# Patient Record
Sex: MALE | Race: WHITE | NOT HISPANIC OR LATINO | Employment: OTHER | URBAN - METROPOLITAN AREA
[De-identification: names, ages, dates, MRNs, and addresses within clinical notes are randomized per-mention and may not be internally consistent; named-entity substitution may affect disease eponyms.]

---

## 2017-03-13 DIAGNOSIS — J18.8 OTHER PNEUMONIA, UNSPECIFIED ORGANISM: Primary | ICD-10-CM

## 2017-03-13 RX ORDER — CEFUROXIME AXETIL 500 MG/1
500 TABLET ORAL 2 TIMES DAILY
Qty: 20 TABLET | Refills: 0 | Status: SHIPPED | OUTPATIENT
Start: 2017-03-13 | End: 2017-07-07

## 2017-05-22 DIAGNOSIS — N52.9 ERECTILE DYSFUNCTION, UNSPECIFIED ERECTILE DYSFUNCTION TYPE: ICD-10-CM

## 2017-05-22 RX ORDER — SILDENAFIL 100 MG/1
100 TABLET, FILM COATED ORAL DAILY PRN
Qty: 10 TABLET | Refills: 1 | Status: SHIPPED | OUTPATIENT
Start: 2017-05-22

## 2017-07-07 ENCOUNTER — OFFICE VISIT (OUTPATIENT)
Dept: UROLOGY | Facility: CLINIC | Age: 72
End: 2017-07-07

## 2017-07-07 VITALS
DIASTOLIC BLOOD PRESSURE: 90 MMHG | BODY MASS INDEX: 34.14 KG/M2 | SYSTOLIC BLOOD PRESSURE: 139 MMHG | WEIGHT: 266 LBS | HEIGHT: 74 IN | HEART RATE: 62 BPM

## 2017-07-07 DIAGNOSIS — R97.20 ELEVATED PROSTATE SPECIFIC ANTIGEN (PSA): Primary | ICD-10-CM

## 2017-07-07 DIAGNOSIS — R35.1 NOCTURIA: ICD-10-CM

## 2017-07-07 DIAGNOSIS — R35.0 URINARY FREQUENCY: ICD-10-CM

## 2017-07-07 RX ORDER — CIPROFLOXACIN 500 MG/1
500 TABLET, FILM COATED ORAL 2 TIMES DAILY
Qty: 6 TABLET | Refills: 0 | Status: SHIPPED | OUTPATIENT
Start: 2017-07-07 | End: 2017-07-07

## 2017-07-07 RX ORDER — CEFPODOXIME PROXETIL 100 MG/1
100 TABLET, FILM COATED ORAL 2 TIMES DAILY
Qty: 6 TABLET | Refills: 0 | Status: SHIPPED | OUTPATIENT
Start: 2017-07-07 | End: 2019-04-02

## 2017-07-07 ASSESSMENT — ENCOUNTER SYMPTOMS
DYSURIA: 1
FLANK PAIN: 0
HEMATURIA: 0
DIFFICULTY URINATING: 1

## 2017-07-07 ASSESSMENT — PAIN SCALES - GENERAL: PAINLEVEL: NO PAIN (0)

## 2017-07-07 NOTE — MR AVS SNAPSHOT
After Visit Summary   7/7/2017    Cy Seymour    MRN: [id number]           Patient Information     Date Of Birth          1945        Visit Information        Provider Department      7/7/2017 9:45 AM Zulay Lopez MD [facility] Urology and [facility] for Prostate and Urologic Cancers        Today's Diagnoses     Elevated prostate specific antigen (PSA)    -  1      Care Instructions    Schedule an MRI of the prostate, a fusion biopsy appointment and a return appointment for results.      [facility] for Prostate and Urologic Cancers  MRI Fusion Prostate Bx Patient Instructions  What is MRI Fusion Prostate Bx?  The MRI fusion biopsy is used to target a specific area for sampling. It requires a needle guide to be inserted into the rectum next to the prostate. Next, MR images are viewed, the abnormal area is identified, and a needle is inserted through the guide to the targeted area for tissue samples.  How do I prepare for the exam?    Take Cipro 500mg one tablet in the morning and one in the evening starting the day prior to procedure x 3 days    You will be receiving a Gentamicin intramuscular injection in the Urology clinic 30 min prior to your procedure. Please plan to arrive 30 min earlier.      Do Fleets Enema 2 hrs prior to procedure (eat a light meal)    Stop NSAIDS/Aspirin 7 days prior to procedure. If you are taking any other anticoagulation medications such as Coumadin, Heparin, or Lovenox, please consult with your physician prior to scheduling the procedure    How long will procedure take?  MRI fusion biopsy will take about 1 hr. Please allow 2 hrs for the whole procedure (including pre and post)  When will I know my results?  We will schedule a 2 week follow up appointment for you to review your pathology results with your physician.   *Please arrive 30 min prior to your scheduled procedure time*  Who do I contact if I have questions?  Please call Urology and [facility] at  [phone] Opt # 3 to speak to a nurse.           Follow-ups after your visit        Your next 10 appointments already scheduled     Jul 27, 2017 11:30 AM CDT   (Arrive by 11:15 AM)   MR PROSTATE with [id number]   [facility]    [address] [id number] [phone]           Take your medicines as usual, unless your doctor tells you not to. Bring a list of your current medicines to your exam (including vitamins, minerals and over-the-counter drugs).  You will be given intravenous contrast for this exam. To prepare:   The day before your exam, drink extra fluids at least six 8-ounce glasses (unless your doctor tells you to restrict your fluids).   Have a blood test (creatinine test) within 30 days of your exam. Go to your clinic or Diagnostic Imaging Department for this test.  The MRI machine uses a strong magnet. Please wear clothes without metal (snaps, zippers). A sweatsuit works well, or we may give you a hospital gown.  Please remove any body piercings and hair extensions before you arrive. You will also remove watches, jewelry, hairpins, wallets, dentures, partial dental plates and hearing aids. You may wear contact lenses, and you may be able to wear your rings. We have a safe place to keep your personal items, but it is safer to leave them at home.   **IMPORTANT** THE INSTRUCTIONS BELOW ARE ONLY FOR THOSE PATIENTS WHO HAVE BEEN TOLD THEY WILL RECEIVE SEDATION OR GENERAL ANESTHESIA DURING THEIR MRI PROCEDURE:  IF YOU WILL RECEIVE SEDATION (take medicine to help you relax during your exam):   You must get the medicine from your doctor before you arrive. Bring the medicine to the exam. Do not take it at home.   Arrive one hour early. Bring someone who can take you home after the test. Your medicine will make you sleepy. After the exam, you may not drive, take a bus or take a taxi by yourself.   No eating 8 hours before your  exam. You may have clear liquids up until 4 hours before your exam. (Clear liquids include water, clear tea, black coffee and fruit juice without pulp.)  IF YOU WILL RECEIVE ANESTHESIA (be asleep for your exam):   Arrive 1 1/2 hours early. Bring someone who can take you home after the test. You may not drive, take a bus or take a taxi by yourself.   No eating 8 hours before your exam. You may have clear liquids up until 4 hours before your exam. (Clear liquids include water, clear tea, black coffee and fruit juice without pulp.)  Please call the Imaging Department at your exam site with any questions.            Aug 11, 2017  1:45 PM CDT   (Arrive by 1:30 PM)   Sonography/Biopsy with Zulay Lopez MD   [facility] Urology and [facility] for Prostate and Urologic Cancers ([facility])    [address] [place] [id number] [phone]            Aug 25, 2017  2:15 PM CDT   (Arrive by 2:00 PM)   Return Visit with Zulay Lopez MD   [facility] Urology and [facility] for Prostate and Urologic Cancers ([facility])    [address] [place] [id number] [phone]              Future tests that were ordered for you today     Open Future Orders        Priority Expected Expires Ordered    MRI Prostate Routine  7/7/2018 7/7/2017            Who to contact     Please call your clinic at [phone] to:    Ask questions about your health    Make or cancel appointments    Discuss your medicines    Learn about your test results    Speak to your doctor   If you have compliments or concerns about an experience at your clinic, or if you wish to file a complaint, please contact [facility] Patient Relations at [phone] or email us at [email or web address]         Additional Information About Your Visit        MyChart Information     [employer] gives you secure access to your electronic  "health record. If you see a primary care provider, you can also send messages to your care team and make appointments. If you have questions, please call your primary care clinic.  If you do not have a primary care provider, please call [phone] and they will assist you.      [employer] is an electronic gateway that provides easy, online access to your medical records. With [employer], you can request a clinic appointment, read your test results, renew a prescription or communicate with your care team.     To access your existing account, please contact your [facility] or call [phone] for assistance.        Care EveryWhere ID     This is your Care EveryWhere ID. This could be used by other organizations to access your [facility] medical records  [id number]        Your Vitals Were     Pulse Height BMI (Body Mass Index)             62 1.88 m (6' 2\") 34.15 kg/m2          Blood Pressure from Last 3 Encounters:   07/07/17 139/90   09/06/16 129/89   04/04/15 133/81    Weight from Last 3 Encounters:   07/07/17 120.7 kg (266 lb)   09/06/16 117.5 kg (259 lb 1.9 oz)   04/04/15 123.4 kg (272 lb)                 Today's Medication Changes          These changes are accurate as of: 7/7/17 11:19 AM.  If you have any questions, ask your nurse or doctor.               Start taking these medicines.        Dose/Directions    cefpodoxime 100 MG tablet   Commonly known as:  VANTIN   Used for:  Elevated prostate specific antigen (PSA)   Started by:  Zulay Lopez MD        Dose:  100 mg   Take 1 tablet (100 mg) by mouth 2 times daily   Quantity:  6 tablet   Refills:  0            Where to get your medicines      These medications were sent to [facility] [address] [place], [place] - [address]   [address] [place] [id number]     Phone:  [phone]     cefpodoxime 100 MG tablet                Primary Care Provider Office Phone # Fax #    Abe Valencia, " [phone]       [facility] [address] [facility] [address]  [facility] [address]        Equal Access to Services     ADÁN REBOLLEDO : Hadii aad ku hadlouisaoscar Wahl, behzadyany laddmariannaha, cami melanieemersonda kristal, raffaele pavelin hayaan shiloevaristo schwarz lamoremoses caraballo. So [facility] [phone].    ATENCIÓN: Si habla español, tiene a sharp disposición servicios gratuitos de asistencia lingüística. Llame al [phone].    We comply with applicable federal civil rights laws and [place] laws. We do not discriminate on the basis of race, color, national origin, age, disability sex, sexual orientation or gender identity.            Thank you!     Thank you for choosing [facility] UROLOGY AND [facility] FOR PROSTATE AND UROLOGIC CANCERS  for your care. Our goal is always to provide you with excellent care. Hearing back from our patients is one way we can continue to improve our services. Please take a few minutes to complete the written survey that you may receive in the mail after your visit with us. Thank you!             Your Updated Medication List - Protect others around you: Learn how to safely use, store and throw away your medicines at www.disposemymeds.org.          This list is accurate as of: 7/7/17 11:19 AM.  Always use your most recent med list.                   Brand Name Dispense Instructions for use Diagnosis    AMLODIPINE BESYLATE PO      Take by mouth daily        amoxicillin 500 MG capsule    AMOXIL    4 capsule    Take 4 capsules 1 hour before dental procedure    Preventive antibiotic       aspirin 81 MG chewable tablet      Take 81 mg by mouth daily.        cefpodoxime 100 MG tablet    VANTIN    6 tablet    Take 1 tablet (100 mg) by mouth 2 times daily    Elevated prostate specific antigen (PSA)       nitroGLYcerin 0.4 MG sublingual tablet    NITROSTAT    10 tablet    Place 1 tablet under the tongue every 5 minutes as needed for chest pain.    CAD (coronary artery disease)       sildenafil 100 MG cap/tab    VIAGRA    10  tablet    Take 1 tablet (100 mg) by mouth daily as needed for erectile dysfunction Do not take with NTG medication    Erectile dysfunction, unspecified erectile dysfunction type

## 2017-07-07 NOTE — PROGRESS NOTES
"It was my pleasure to see Cy Seymour a 72 year old year old male today. Patient was referred by Dr. Valencia for possible MRI fusion prostate biopsy    HPI: Pt is a very pleasant 71 yo white male who presents to office today complaining of enlarged prostate and interested in MRI fusion biopsy. He states he has been followed by another urologist in the past and was diagnosed with an enlarged prostate. He reports his PSA has been monitored over past several years and has ranged 3-8. He also says he has undergone 1-2 MRIs of prostate in past with positive lesions but these results are unavailable at this time. He was considering a \"Urolift\" procedure but was told he should undergo prostate biospy prior to this intervention to rule out prostate cancer. He was schedule for biopsy at his previous urologist but opted to pursue MRI fusion biopsy with another provider.    Today his symptoms include nocturia, on average 5 times per night but as severe as 10 times per night. He also c/o of slow stream, starting and stopping, urgency with rare instances of leakage and incontinence. He does not wear pads/diapers. He has been prescribed medication for BPH in the past but has never taken anything.    Denies history of UTIs, kidney infections, or kidney stones.He reports a positive family hx of prostate cancer in father ( of CHF at 88 yo; tx unknown) and brother (living at 76 yo; tx with radiation).    Past Medical History:   Diagnosis Date     ADHD (attention deficit hyperactivity disorder)      Aneurysm artery, iliac (H)     father had AAA surgery     Anxiety      Atrial fibrillation (H) 2011     Bicuspid aortic valve      Coronary artery disease     ablation      Depressive disorder      Diverticulitis      Gastro-oesophageal reflux disease      Hearing loss      Hypertension      Polymyalgia rheumatica (H)      Restless legs      S/P nasal septoplasty          Seasonal affective disorder (H)      " "Sleep apnea      Tachycardia     exercise initiated       Past Surgical History:   Procedure Laterality Date     BACK SURGERY      cervical fusion 2005     BLEPHAROPLASTY BILATERAL  2/27/2012    Procedure:BLEPHAROPLASTY BILATERAL; BILATERAL UPPER LID BLEPHAROPLASTY ; Surgeon:CÉSAR, JESSE RAMIREZ; Location:[facility]     CARDIAC SURGERY      stent 2003     ORTHOPEDIC SURGERY      bilat hip replaced 2008,2009   Ablation for A-fib in 2012.    Family History   Problem Relation Age of Onset     Arthritis Mother      Hypertension Mother      Hypertension Father      Heart Failure Father      HEART DISEASE Maternal Grandfather      HEART DISEASE Paternal Grandmother    Prostate ca in father and brother.    Current Outpatient Prescriptions   Medication Sig Dispense Refill     sildenafil (VIAGRA) 100 MG cap/tab Take 1 tablet (100 mg) by mouth daily as needed for erectile dysfunction Do not take with NTG medication 10 tablet 1     AMLODIPINE BESYLATE PO Take by mouth daily       amoxicillin (AMOXIL) 500 MG capsule Take 4 capsules 1 hour before dental procedure 4 capsule 1     aspirin 81 MG chewable tablet Take 81 mg by mouth daily.       nitroglycerin (NITROSTAT) 0.4 MG SL tablet Place 1 tablet under the tongue every 5 minutes as needed for chest pain. 10 tablet 0       ALLERGIES: Sulfa drugs      REVIEW OF SYSTEMS:  Skin: negative  Eyes: negative  Ears/Nose/Throat: negative  Respiratory: No shortness of breath, dyspnea on exertion, cough, or hemoptysis  Cardiovascular: negative  Gastrointestinal: negative  Genitourinary: as above  Musculoskeletal: negative  Neurologic: negative  Psych: negative  Heme/lymph: negative  Endocrine: negative    GENERAL PHYSICAL EXAM:   Vitals: /90  Pulse 62  Ht 1.88 m (6' 2\")  Wt 120.7 kg (266 lb)  BMI 34.15 kg/m2  Body mass index is 34.15 kg/(m^2).  Constitutional: healthy, alert and no distress  Head: Normocephalic. No masses, lesions, tenderness or abnormalities  Eyes: Non-icteric " sclera  ENT: Extenal nares clear; oral mucosa pink and moist  Cardiovascular: RRR, 2/6 systolic blowing murmur, no rubs, no gallops.  Respiratory: Normal breath sounds in bilateral anterior and posterior fields.  Gastrointestinal: Abdomen soft, non-tender. BS normal. No masses, organomegaly  Musculoskeletal: extremities normal- no gross deformities noted, gait normal and normal muscle tone  Skin: no suspicious lesions or rashes  Neurologic: A/Ox3. No gross abnormalities noted.  Psychiatric: affect normal/bright and mentation appears normal.     EXAM:   Left Flank: negative  Right Flank: negative  Others deferred    RECTAL EXAM:   Size: 1-2+  Sphincter tone: normal  Tenderness:  Absent  Rectal Mass: Absent  Prostate Nodule: Absent      RADIOLOGY: None  LABS: None    ASSESSMENT: Pt is a 71 yo male with PMH significant for A-fib s/p ablation, CAD s/p stent, HTN, HLD, and BPH, who presents today with LUTS.    PLAN:     1) Elevated PSA - Pt previously dx with BPH. PSA per pt report has ranged 3-8. Pt has notable family hx of prostate cancer in father and brother. Will plan to schedule MRI of prostate followed by fusion biopsy.    2) LUTS - Today complains of nocturia, urgency, rare leakage/incontinence. He remains interested in surgical intervention for BPH and LUTS but prostate cancer has not been ruled out via biopsy. Will proceed with MRI and fusion biopsy as above. Offered Tamsulosin to attempt to alleviate sx but pt prefers to not take medications. Also discussed benefits and risks of surgical intervention for enlarged prostate, including Urolift vs Rezume.     Tim JENKINS, served as a scribe for Dr. Lopez.    MS4-IUSM    I agree with the PFSH and ROS as completed by the MS.  The remainder of the encounter was performed by me and scribed by the MS.  The scribed note accurately reflects my personal services and the decisions made by me.      I spent over 30 minutes with the patient.  Over half this time  was spent on counseling for elevated PSA and lower urinary tract symptoms.

## 2017-07-07 NOTE — NURSING NOTE
"Chief Complaint   Patient presents with     Consult     consult for an MRI guided biopsy       Blood pressure 139/90, pulse 62, height 1.88 m (6' 2\"), weight 120.7 kg (266 lb). Body mass index is 34.15 kg/(m^2).    Patient Active Problem List   Diagnosis     Atrial fibrillation (H)     ADHD (attention deficit hyperactivity disorder)     CAD (coronary artery disease)     Insomnia     Major depression, recurrent (H)     BPH (benign prostatic hyperplasia)     Hypertension     Peripheral neuropathy (H)     Preventative health care     Bilateral sensorineural hearing loss     Essential hypertension with goal blood pressure less than 140/90     Hyperlipidemia LDL goal <100       Allergies   Allergen Reactions     Sulfa Drugs        Current Outpatient Prescriptions   Medication Sig Dispense Refill     sildenafil (VIAGRA) 100 MG cap/tab Take 1 tablet (100 mg) by mouth daily as needed for erectile dysfunction Do not take with NTG medication 10 tablet 1     AMLODIPINE BESYLATE PO Take by mouth daily       amoxicillin (AMOXIL) 500 MG capsule Take 4 capsules 1 hour before dental procedure 4 capsule 1     aspirin 81 MG chewable tablet Take 81 mg by mouth daily.       nitroglycerin (NITROSTAT) 0.4 MG SL tablet Place 1 tablet under the tongue every 5 minutes as needed for chest pain. 10 tablet 0       Social History   Substance Use Topics     Smoking status: Never Smoker     Smokeless tobacco: Never Used     Alcohol use Yes      Comment: 1-2 alcoholic beverages per month       ADRIANNE Pearson  7/7/2017  9:51 AM       "

## 2017-07-07 NOTE — LETTER
"2017       RE: Cy Seymour  [address]   [facility] [place] [address]     Dear Colleague,    Thank you for referring your patient, yC Seymour, to the [facility] UROLOGY AND INST FOR PROSTATE AND UROLOGIC CANCERS at [facility]. Please see a copy of my visit note below.    It was my pleasure to see Cy Seymour a 72 year old year old male today. Patient was referred by Dr. Valencia for possible MRI fusion prostate biopsy    HPI: Pt is a very pleasant 71 yo white male who presents to office today complaining of enlarged prostate and interested in MRI fusion biopsy. He states he has been followed by another urologist in the past and was diagnosed with an enlarged prostate. He reports his PSA has been monitored over past several years and has ranged 3-8. He also says he has undergone 1-2 MRIs of prostate in past with positive lesions but these results are unavailable at this time. He was considering a \"Urolift\" procedure but was told he should undergo prostate biospy prior to this intervention to rule out prostate cancer. He was schedule for biopsy at his previous urologist but opted to pursue MRI fusion biopsy with another provider.    Today his symptoms include nocturia, on average 5 times per night but as severe as 10 times per night. He also c/o of slow stream, starting and stopping, urgency with rare instances of leakage and incontinence. He does not wear pads/diapers. He has been prescribed medication for BPH in the past but has never taken anything.    Denies history of UTIs, kidney infections, or kidney stones.He reports a positive family hx of prostate cancer in father ( of CHF at 90 yo; tx unknown) and brother (living at 78 yo; tx with radiation).    Past Medical History:   Diagnosis Date     ADHD (attention deficit hyperactivity disorder)      Aneurysm artery, iliac (H)     father had AAA surgery     Anxiety      Atrial " fibrillation (H) 5/9/2011     Bicuspid aortic valve      Coronary artery disease     ablation 2011     Depressive disorder      Diverticulitis      Gastro-oesophageal reflux disease      Hearing loss      Hypertension      Polymyalgia rheumatica (H)      Restless legs      S/P nasal septoplasty     1998     Seasonal affective disorder (H)      Sleep apnea      Tachycardia     exercise initiated       Past Surgical History:   Procedure Laterality Date     BACK SURGERY      cervical fusion 2005     BLEPHAROPLASTY BILATERAL  2/27/2012    Procedure:BLEPHAROPLASTY BILATERAL; BILATERAL UPPER LID BLEPHAROPLASTY ; Surgeon:READER, JESSE RAMIREZ; Location:[facility]     CARDIAC SURGERY      stent 2003     ORTHOPEDIC SURGERY      bilat hip replaced 2008,2009   Ablation for A-fib in 2012.    Family History   Problem Relation Age of Onset     Arthritis Mother      Hypertension Mother      Hypertension Father      Heart Failure Father      HEART DISEASE Maternal Grandfather      HEART DISEASE Paternal Grandmother    Prostate ca in father and brother.    Current Outpatient Prescriptions   Medication Sig Dispense Refill     sildenafil (VIAGRA) 100 MG cap/tab Take 1 tablet (100 mg) by mouth daily as needed for erectile dysfunction Do not take with NTG medication 10 tablet 1     AMLODIPINE BESYLATE PO Take by mouth daily       amoxicillin (AMOXIL) 500 MG capsule Take 4 capsules 1 hour before dental procedure 4 capsule 1     aspirin 81 MG chewable tablet Take 81 mg by mouth daily.       nitroglycerin (NITROSTAT) 0.4 MG SL tablet Place 1 tablet under the tongue every 5 minutes as needed for chest pain. 10 tablet 0       ALLERGIES: Sulfa drugs      REVIEW OF SYSTEMS:  Skin: negative  Eyes: negative  Ears/Nose/Throat: negative  Respiratory: No shortness of breath, dyspnea on exertion, cough, or hemoptysis  Cardiovascular: negative  Gastrointestinal: negative  Genitourinary: as above  Musculoskeletal: negative  Neurologic: negative  Psych:  "negative  Heme/lymph: negative  Endocrine: negative    GENERAL PHYSICAL EXAM:   Vitals: /90  Pulse 62  Ht 1.88 m (6' 2\")  Wt 120.7 kg (266 lb)  BMI 34.15 kg/m2  Body mass index is 34.15 kg/(m^2).  Constitutional: healthy, alert and no distress  Head: Normocephalic. No masses, lesions, tenderness or abnormalities  Eyes: Non-icteric sclera  ENT: Extenal nares clear; oral mucosa pink and moist  Cardiovascular: RRR, 2/6 systolic blowing murmur, no rubs, no gallops.  Respiratory: Normal breath sounds in bilateral anterior and posterior fields.  Gastrointestinal: Abdomen soft, non-tender. BS normal. No masses, organomegaly  Musculoskeletal: extremities normal- no gross deformities noted, gait normal and normal muscle tone  Skin: no suspicious lesions or rashes  Neurologic: A/Ox3. No gross abnormalities noted.  Psychiatric: affect normal/bright and mentation appears normal.     EXAM:   Left Flank: negative  Right Flank: negative  Others deferred    RECTAL EXAM:   Size: 1-2+  Sphincter tone: normal  Tenderness:  Absent  Rectal Mass: Absent  Prostate Nodule: Absent      RADIOLOGY: None  LABS: None    ASSESSMENT: Pt is a 71 yo male with PMH significant for A-fib s/p ablation, CAD s/p stent, HTN, HLD, and BPH, who presents today with LUTS.    PLAN:     1) Elevated PSA - Pt previously dx with BPH. PSA per pt report has ranged 3-8. Pt has notable family hx of prostate cancer in father and brother. Will plan to schedule MRI of prostate followed by fusion biopsy.    2) LUTS - Today complains of nocturia, urgency, rare leakage/incontinence. He remains interested in surgical intervention for BPH and LUTS but prostate cancer has not been ruled out via biopsy. Will proceed with MRI and fusion biopsy as above. Offered Tamsulosin to attempt to alleviate sx but pt prefers to not take medications. Also discussed benefits and risks of surgical intervention for enlarged prostate, including Urolift vs Rezume.     Tim JENKINS " Mosqueda, served as a scribe for Dr. Lopez.    MS4-IUSM    I agree with the PFSH and ROS as completed by the MS.  The remainder of the encounter was performed by me and scribed by the MS.  The scribed note accurately reflects my personal services and the decisions made by me.      I spent over 30 minutes with the patient.  Over half this time was spent on counseling for elevated PSA and lower urinary tract symptoms.    Zulay Lopez MD

## 2017-07-31 ENCOUNTER — TELEPHONE (OUTPATIENT)
Dept: UROLOGY | Facility: CLINIC | Age: 72
End: 2017-07-31

## 2017-07-31 NOTE — TELEPHONE ENCOUNTER
Patient returned call from Tejal for biopsy prep.  Gave patient the info as stated on prostate biopsy prep sheet on Q drive.  Patient states understanding.

## 2017-08-11 ENCOUNTER — OFFICE VISIT (OUTPATIENT)
Dept: UROLOGY | Facility: CLINIC | Age: 72
End: 2017-08-11

## 2017-08-11 ENCOUNTER — TRANSFERRED RECORDS (OUTPATIENT)
Dept: HEALTH INFORMATION MANAGEMENT | Facility: CLINIC | Age: 72
End: 2017-08-11

## 2017-08-11 VITALS
SYSTOLIC BLOOD PRESSURE: 131 MMHG | WEIGHT: 261 LBS | BODY MASS INDEX: 33.5 KG/M2 | HEART RATE: 67 BPM | HEIGHT: 74 IN | DIASTOLIC BLOOD PRESSURE: 82 MMHG

## 2017-08-11 DIAGNOSIS — N40.1 BENIGN PROSTATIC HYPERPLASIA WITH LOWER URINARY TRACT SYMPTOMS, SYMPTOM DETAILS UNSPECIFIED: Primary | ICD-10-CM

## 2017-08-11 DIAGNOSIS — R97.20 ELEVATED PROSTATE SPECIFIC ANTIGEN (PSA): ICD-10-CM

## 2017-08-11 ASSESSMENT — PAIN SCALES - GENERAL
PAINLEVEL: NO PAIN (0)
PAINLEVEL: NO PAIN (0)

## 2017-08-11 NOTE — NURSING NOTE
"Chief Complaint   Patient presents with     Prostate Biopsy     Verified with pt that he has been off aspirin products and blood thinners, started Vantin 100 mg BID x 3 days 8/10/17 in anticipation of an MRI guided biopsy, and did a fleets enema this morning.      Blood pressure 131/82, pulse 67, height 1.88 m (6' 2\"), weight 118.4 kg (261 lb). Body mass index is 33.51 kg/(m^2).    Patient Active Problem List   Diagnosis     Atrial fibrillation (H)     ADHD (attention deficit hyperactivity disorder)     CAD (coronary artery disease)     Insomnia     Major depression, recurrent (H)     BPH (benign prostatic hyperplasia)     Hypertension     Peripheral neuropathy (H)     Preventative health care     Bilateral sensorineural hearing loss     Essential hypertension with goal blood pressure less than 140/90     Hyperlipidemia LDL goal <100       Allergies   Allergen Reactions     Sulfa Drugs        Current Outpatient Prescriptions   Medication Sig Dispense Refill     cefpodoxime (VANTIN) 100 MG tablet Take 1 tablet (100 mg) by mouth 2 times daily 6 tablet 0     sildenafil (VIAGRA) 100 MG cap/tab Take 1 tablet (100 mg) by mouth daily as needed for erectile dysfunction Do not take with NTG medication 10 tablet 1     AMLODIPINE BESYLATE PO Take by mouth daily       amoxicillin (AMOXIL) 500 MG capsule Take 4 capsules 1 hour before dental procedure 4 capsule 1     aspirin 81 MG chewable tablet Take 81 mg by mouth daily.       nitroglycerin (NITROSTAT) 0.4 MG SL tablet Place 1 tablet under the tongue every 5 minutes as needed for chest pain. 10 tablet 0       Social History   Substance Use Topics     Smoking status: Never Smoker     Smokeless tobacco: Never Used     Alcohol use Yes      Comment: 1-2 alcoholic beverages per month       ADRIANNE Pearson  8/11/2017  1:58 PM       "

## 2017-08-11 NOTE — LETTER
8/11/2017       RE: Cy Seymour  [address]   [facility] [address]     Dear Colleague,    Thank you for referring your patient, Cy Seymour, to the [facility] UROLOGY AND INST FOR PROSTATE AND UROLOGIC CANCERS at [facility]. Please see a copy of my visit note below.    Patient presents for prostate biopsy today       PSA   Date Value Ref Range Status   09/06/2016 3.38 0 - 4 ug/L Final     Comment:     Assay Method:  Chemiluminescence using Siemens Vista analyzer   04/04/2015 6.77 (H) 0 - 4 ug/L Final   01/21/2015 8.59 (H) 0 - 4 ug/L Final   09/19/2012 2.94 0 - 4 ug/L Final     Comment:     PSA results are about 7% lower than our prior method due to a methodology   change   on August 30, 2011.       An enema was completed and 500 mg of Cipro was given prior to the biopsy.  After obtaining informed consent patient was placed in lateral decubitus position.  The ultrasound probe was placed in the rectum.  The prostate was numbed using ultrasound guidance with 1% lidocaine 5 mls along each nerve bundle.  The volume was measured and estimated to be 60 grams. 12 cores were taken with 6 on each side, base, mid and apex.  The patient tolerated the procedure well.            Again, thank you for allowing me to participate in the care of your patient.      Sincerely,    Zulay Lopez MD

## 2017-08-11 NOTE — MR AVS SNAPSHOT
After Visit Summary   8/11/2017    Cy Seymour    MRN: [id number]           Patient Information     Date Of Birth          1945        Visit Information        Provider Department      8/11/2017 1:45 PM Zulay Lopez MD [facility] Urology and [facility] for Prostate and Urologic Cancers        Care [facility] for Prostate and Urologic Cancers  Precautions Following a Prostate Biopsy    There are four conditions that you should watch for after a prostate biopsy:    1. Excessive pain  2. Bleeding irregularities (passing numerous  dime sized  clots or if your urine looks like cranberry juice)  3. Fever of 100 degrees or more  4. If you are unable to urinate        If any of these occur, call the Urology Clinic during normal business hours (M-F, 8:00-4:30) at [phone].  If you experience a problem after normal business hours, call our 24-hour phone number at [phone] and ask for the Urology Resident on call to be paged.      If you experience any discomfort following the biopsy, you may take Tylenol.  DO NOT TAKE ASPIRIN unless specified by your physician.   If the discomfort becomes severe or uncontrolled by medication, contact the Urology Clinic or Urology Resident (after normal business hours).      Do not be alarmed if you have some blood in your stool, in your urine, or ejaculate (semen).  This occurrence is normal and may last up to three (3) or four (4) days, usually intermittently.  Blood in the ejaculate (semen) may last several weeks, up to about a dozen ejaculations.  The blood in your ejaculate may appear as brown streaks, blood tinged, and immediately following a biopsy, it may appear bright red.      If you run a fever above 100 degrees, call the Urology Clinic or Urology Resident (after normal business hours) immediately.  If you are unable to reach your physician or the Resident on call, go to the nearest emergency room.  Explain that you have  had a transrectal biopsy of your prostate and what problems you are experiencing.        You should attempt to urinate following your biopsy before you leave the clinic.  If you are unable to urinate four (4) to six (6) hours after you leave the clinic, you will need to contact the Urology Clinic or the Resident on call.  If you are unable to reach your physician or the Resident on call, go to the nearest emergency room.            If you have any questions or concerns after your biopsy, feel free to contact the Urology Clinic at [phone] during M-F, 8:00-5pm business hours.  If you need to speak with someone after normal business hours, call [phone] and ask for the Resident on call to be paged.            Follow-ups after your visit        Your next 10 appointments already scheduled     Aug 14, 2017 11:20 AM CDT   Return Visit with Abe Valencia MD   [facility])    [address]  [facility] [address]   [phone]            Aug 25, 2017  2:15 PM CDT   (Arrive by 2:00 PM)   Return Visit with Zulay Lopez MD   [facility] Urology and [facility] for Prostate and Urologic Cancers ([facility])    [address] [place] [id number] [phone]              Who to contact     Please call your clinic at [phone] to:    Ask questions about your health    Make or cancel appointments    Discuss your medicines    Learn about your test results    Speak to your doctor   If you have compliments or concerns about an experience at your clinic, or if you wish to file a complaint, please contact [facility] Patient Relations at [phone] or email us at [email or web address]         Additional Information About Your Visit        MyChart Information     [employer] gives you secure access to your electronic health record. If you see a primary care provider,  "you can also send messages to your care team and make appointments. If you have questions, please call your primary care clinic.  If you do not have a primary care provider, please call [phone] and they will assist you.      [employer] is an electronic gateway that provides easy, online access to your medical records. With [employer], you can request a clinic appointment, read your test results, renew a prescription or communicate with your care team.     To access your existing account, please contact your [facility] or call [phone] for assistance.        Care EveryWhere ID     This is your Care EveryWhere ID. This could be used by other organizations to access your [facility] medical records  [id number]        Your Vitals Were     Pulse Height BMI (Body Mass Index)             67 1.88 m (6' 2\") 33.51 kg/m2          Blood Pressure from Last 3 Encounters:   08/11/17 131/82   07/07/17 139/90   09/06/16 129/89    Weight from Last 3 Encounters:   08/11/17 118.4 kg (261 lb)   07/07/17 120.7 kg (266 lb)   09/06/16 117.5 kg (259 lb 1.9 oz)              Today, you had the following     No orders found for display       Primary Care Provider Office Phone # Fax #    Abe Valencia [phone]       [address] [place] [address]        Equal Access to Services     [facility]ILDA [facility] : Hadkendrick somers Soeduarda, waaxda luqadaha, qaybta kaalmada raffaele giraldo . So [facility] [phone].    ATENCIÓN: Si habla español, tiene a sharp disposición servicios gratuitos de asistencia lingüística. Citlali al [phone].    We comply with applicable federal civil rights laws and [place] laws. We do not discriminate on the basis of race, color, national origin, age, disability sex, sexual orientation or gender identity.            Thank you!     Thank you for choosing [facility] UROLOGY AND [facility] FOR PROSTATE AND UROLOGIC CANCERS  for your " care. Our goal is always to provide you with excellent care. Hearing back from our patients is one way we can continue to improve our services. Please take a few minutes to complete the written survey that you may receive in the mail after your visit with us. Thank you!             Your Updated Medication List - Protect others around you: Learn how to safely use, store and throw away your medicines at www.disposemymeds.org.          This list is accurate as of: 8/11/17  2:17 PM.  Always use your most recent med list.                   Brand Name Dispense Instructions for use Diagnosis    AMLODIPINE BESYLATE PO      Take by mouth daily        amoxicillin 500 MG capsule    AMOXIL    4 capsule    Take 4 capsules 1 hour before dental procedure    Preventive antibiotic       aspirin 81 MG chewable tablet      Take 81 mg by mouth daily.        cefpodoxime 100 MG tablet    VANTIN    6 tablet    Take 1 tablet (100 mg) by mouth 2 times daily    Elevated prostate specific antigen (PSA)       nitroGLYcerin 0.4 MG sublingual tablet    NITROSTAT    10 tablet    Place 1 tablet under the tongue every 5 minutes as needed for chest pain.    CAD (coronary artery disease)       sildenafil 100 MG cap/tab    VIAGRA    10 tablet    Take 1 tablet (100 mg) by mouth daily as needed for erectile dysfunction Do not take with NTG medication    Erectile dysfunction, unspecified erectile dysfunction type

## 2017-08-11 NOTE — NURSING NOTE
Xylocaine 1%  Drug amount given = 15 mL  Drug amount wasted = 5 mL  ND: 20727-810-69  ADRINANE Pearson  8/11/2017  3:19 PM

## 2017-08-11 NOTE — NURSING NOTE
Invasive Procedure Safety Checklist:    Procedure:     Action: Complete sections and checkboxes as appropriate.    Pre-procedure:  1. Patient ID Verified with 2 identifiers (Joana and  or MRN) : YES    2. Procedure and site verified with patient/designee (when able) : YES    3. Accurate consent documentation in medical record : YES    4. H&P (or appropriate assessment) documented in medical record : YES  H&P must be up to 30 days prior to procedure an updated within 24 hours of                 Procedure as applicable.     5. Relevant diagnostic and radiology test results appropriately labeled and displayed as applicable : YES    6. Blood products, implants, devices, and/or special equipment available for the procedure as applicable : YES    7. Procedure site(s) marked with provider initials [Exclusions: None] : NO    8. Marking not required. Reason : Yes  Procedure does not require site marking    Time Out:     Time-Out performed immediately prior to starting procedure, including verbal and active participation of all team members addressing: YES    1. Correct patient identity.  2. Confirmed that the correct side and site are marked.  3. An accurate procedure to be done.  4. Agreement on the procedure to be done.  5. Correct patient position.  6. Relevant images and results are properly labeled and appropriately displayed.  7. The need to administer antibiotics or fluids for irrigation purposes during the procedure as applicable.  8. Safety precautions based on patient history or medication use.    During Procedure: Verification of correct person, site, and procedure occurs any time the responsibility for care of the patient is transferred to another member of the care team.

## 2017-08-11 NOTE — PATIENT INSTRUCTIONS
[place] for Prostate and Urologic Cancers  Precautions Following a Prostate Biopsy    There are four conditions that you should watch for after a prostate biopsy:    1. Excessive pain  2. Bleeding irregularities (passing numerous  dime sized  clots or if your urine looks like cranberry juice)  3. Fever of 100 degrees or more  4. If you are unable to urinate        If any of these occur, call the Urology Clinic during normal business hours (M-F, 8:00-4:30) at [phone].  If you experience a problem after normal business hours, call our 24-hour phone number at [phone] and ask for the Urology Resident on call to be paged.      If you experience any discomfort following the biopsy, you may take Tylenol.  DO NOT TAKE ASPIRIN unless specified by your physician.   If the discomfort becomes severe or uncontrolled by medication, contact the Urology Clinic or Urology Resident (after normal business hours).      Do not be alarmed if you have some blood in your stool, in your urine, or ejaculate (semen).  This occurrence is normal and may last up to three (3) or four (4) days, usually intermittently.  Blood in the ejaculate (semen) may last several weeks, up to about a dozen ejaculations.  The blood in your ejaculate may appear as brown streaks, blood tinged, and immediately following a biopsy, it may appear bright red.      If you run a fever above 100 degrees, call the Urology Clinic or Urology Resident (after normal business hours) immediately.  If you are unable to reach your physician or the Resident on call, go to the nearest emergency room.  Explain that you have had a transrectal biopsy of your prostate and what problems you are experiencing.        You should attempt to urinate following your biopsy before you leave the clinic.  If you are unable to urinate four (4) to six (6) hours after you leave the clinic, you will need to contact the Urology Clinic or the Resident on call.  If you are unable to reach  your physician or the Resident on call, go to the nearest emergency room.            If you have any questions or concerns after your biopsy, feel free to contact the Urology Clinic at [phone] during M-F, 8:00-5pm business hours.  If you need to speak with someone after normal business hours, call [phone] and ask for the Resident on call to be paged.

## 2017-08-12 NOTE — PROGRESS NOTES
Patient presents for prostate biopsy today       PSA   Date Value Ref Range Status   09/06/2016 3.38 0 - 4 ug/L Final     Comment:     Assay Method:  Chemiluminescence using Siemens Vista analyzer   04/04/2015 6.77 (H) 0 - 4 ug/L Final   01/21/2015 8.59 (H) 0 - 4 ug/L Final   09/19/2012 2.94 0 - 4 ug/L Final     Comment:     PSA results are about 7% lower than our prior method due to a methodology   change   on August 30, 2011.       An enema was completed and 500 mg of Cipro was given prior to the biopsy.  After obtaining informed consent patient was placed in lateral decubitus position.  The ultrasound probe was placed in the rectum.  The prostate was numbed using ultrasound guidance with 1% lidocaine 5 mls along each nerve bundle.  The volume was measured and estimated to be 60 grams. 12 cores were taken with 6 on each side, base, mid and apex.  The patient tolerated the procedure well.

## 2017-08-13 ENCOUNTER — TELEPHONE (OUTPATIENT)
Dept: UROLOGY | Facility: CLINIC | Age: 72
End: 2017-08-13

## 2017-08-14 ENCOUNTER — PRE VISIT (OUTPATIENT)
Dept: UROLOGY | Facility: CLINIC | Age: 72
End: 2017-08-14

## 2017-08-14 LAB — COPATH REPORT: NORMAL

## 2017-08-14 NOTE — TELEPHONE ENCOUNTER
Telephone Encounter  Author: Yony Reed MD    Date: 7:54 PM; 8/13/2017  Patient Name: Cy Seymour  MRN: [id number]    Paged by  that Mr. Cy Seymour called requesting to speak with urology on call.  I contacted the patient.  Briefly, the patient underwent TRUSP bx on Friday and now has a tempt to 100.6 associated with chills, myalgias, and erythematous skin.  At this point, I advised the patient to seek care at the ED for evaluation and possible treatment.  He notes he lives closest to [place] and will seek care there.    --    Shashank Reed MD   Urology Resident  pgr: [phone]    7:54 PM; 8/13/2017

## 2017-08-18 ENCOUNTER — TELEPHONE (OUTPATIENT)
Dept: UROLOGY | Facility: CLINIC | Age: 72
End: 2017-08-18

## 2017-08-18 DIAGNOSIS — N41.9 PROSTATE INFECTION: Primary | ICD-10-CM

## 2017-08-18 RX ORDER — CIPROFLOXACIN 500 MG/1
500 TABLET, FILM COATED ORAL 2 TIMES DAILY
Qty: 22 TABLET | Refills: 0 | Status: SHIPPED | OUTPATIENT
Start: 2017-08-18 | End: 2017-08-21

## 2017-08-18 NOTE — TELEPHONE ENCOUNTER
Pt called triage stating he had been in the hospital following his prostate biopsy, and that his left testicle was swollen.     I notified Dr. Lopez. She ordered an additional 11 days of Augmenton 875-125 and Cipro 500 mg for a total of three weeks.    Pt notified and expressed understanding. He will come see us next Friday.    Pt instructed to call if he doesn't notice and improvement per Dr. Lopez.

## 2017-08-21 ENCOUNTER — OFFICE VISIT (OUTPATIENT)
Dept: FAMILY MEDICINE | Facility: CLINIC | Age: 72
End: 2017-08-21

## 2017-08-21 VITALS
OXYGEN SATURATION: 94 % | SYSTOLIC BLOOD PRESSURE: 120 MMHG | HEIGHT: 74 IN | HEART RATE: 71 BPM | TEMPERATURE: 98.8 F | DIASTOLIC BLOOD PRESSURE: 81 MMHG | WEIGHT: 259.08 LBS | BODY MASS INDEX: 33.25 KG/M2

## 2017-08-21 DIAGNOSIS — M25.50 PAIN IN JOINT, MULTIPLE SITES: ICD-10-CM

## 2017-08-21 DIAGNOSIS — A41.51 SEPSIS DUE TO ESCHERICHIA COLI (H): Primary | ICD-10-CM

## 2017-08-21 LAB
BASOPHILS # BLD AUTO: 0.1 10E9/L (ref 0–0.2)
BASOPHILS NFR BLD AUTO: 0.4 %
DIFFERENTIAL METHOD BLD: ABNORMAL
EOSINOPHIL # BLD AUTO: 0.3 10E9/L (ref 0–0.7)
EOSINOPHIL NFR BLD AUTO: 1.9 %
ERYTHROCYTE [DISTWIDTH] IN BLOOD BY AUTOMATED COUNT: 13.2 % (ref 10–15)
HCT VFR BLD AUTO: 43.1 % (ref 40–53)
HGB BLD-MCNC: 14.2 G/DL (ref 13.3–17.7)
IMM GRANULOCYTES # BLD: 0.1 10E9/L (ref 0–0.4)
IMM GRANULOCYTES NFR BLD: 0.5 %
LYMPHOCYTES # BLD AUTO: 1.7 10E9/L (ref 0.8–5.3)
LYMPHOCYTES NFR BLD AUTO: 12.7 %
MCH RBC QN AUTO: 29.2 PG (ref 26.5–33)
MCHC RBC AUTO-ENTMCNC: 32.9 G/DL (ref 31.5–36.5)
MCV RBC AUTO: 89 FL (ref 78–100)
MONOCYTES # BLD AUTO: 0.9 10E9/L (ref 0–1.3)
MONOCYTES NFR BLD AUTO: 6.7 %
NEUTROPHILS # BLD AUTO: 10.6 10E9/L (ref 1.6–8.3)
NEUTROPHILS NFR BLD AUTO: 77.8 %
NRBC # BLD AUTO: 0 10*3/UL
NRBC BLD AUTO-RTO: 0 /100
PLATELET # BLD AUTO: 380 10E9/L (ref 150–450)
RBC # BLD AUTO: 4.86 10E12/L (ref 4.4–5.9)
WBC # BLD AUTO: 13.7 10E9/L (ref 4–11)

## 2017-08-21 ASSESSMENT — PAIN SCALES - GENERAL: PAINLEVEL: MILD PAIN (2)

## 2017-08-21 NOTE — NURSING NOTE
"72 year old  Chief Complaint   Patient presents with     Hospital F/U     Prostate Biopsy was taken and now the site has been infected. Pt was seen at [facility]. Discharged on 8/16/17. Biopsy was done on 8/11/17.        Blood pressure 120/81, pulse 71, temperature 98.8  F (37.1  C), temperature source Oral, height 6' 2.02\" (188 cm), weight 259 lb 1.3 oz (117.5 kg), SpO2 94 %. Body mass index is 33.25 kg/(m^2).  Patient Active Problem List   Diagnosis     Atrial fibrillation (H)     ADHD (attention deficit hyperactivity disorder)     CAD (coronary artery disease)     Insomnia     Major depression, recurrent (H)     BPH (benign prostatic hyperplasia)     Hypertension     Peripheral neuropathy (H)     Preventative health care     Bilateral sensorineural hearing loss     Essential hypertension with goal blood pressure less than 140/90     Hyperlipidemia LDL goal <100       Wt Readings from Last 2 Encounters:   08/21/17 259 lb 1.3 oz (117.5 kg)   08/11/17 261 lb (118.4 kg)     BP Readings from Last 3 Encounters:   08/21/17 120/81   08/11/17 131/82   07/07/17 139/90         Current Outpatient Prescriptions   Medication     CIPROFLOXACIN PO     amoxicillin-clavulanate (AUGMENTIN) 875-125 MG per tablet     cefpodoxime (VANTIN) 100 MG tablet     sildenafil (VIAGRA) 100 MG cap/tab     AMLODIPINE BESYLATE PO     aspirin 81 MG chewable tablet     nitroglycerin (NITROSTAT) 0.4 MG SL tablet     No current facility-administered medications for this visit.        Social History   Substance Use Topics     Smoking status: Never Smoker     Smokeless tobacco: Never Used     Alcohol use Yes      Comment: 1-2 alcoholic beverages per month       Health Maintenance Due   Topic Date Due     DEXA Q3 YR  06/15/1976     ADVANCE DIRECTIVE PLANNING Q5 YRS  06/15/2001     FALL RISK ASSESSMENT  06/15/2011     PHQ-9 Q1 MONTH  12/21/2016     PHQ-9 Q3 MONTHS  02/21/2017     LIPID MONITORING Q1 YEAR  09/06/2017       No results found for: " PAP    Marnie Stanford MA  August 21, 2017 11:42 AM

## 2017-08-21 NOTE — MR AVS SNAPSHOT
After Visit Summary   8/21/2017    Cy Seymour    MRN: [id number]           Patient Information     Date Of Birth          1945        Visit Information        Provider Department      8/21/2017 11:20 AM Abe Valencia MD [facility]        Today's Diagnoses     Sepsis due to Escherichia coli (H)    -  1    Pain in joint, multiple sites           Follow-ups after your visit        Your next 10 appointments already scheduled     Aug 25, 2017  2:15 PM CDT   (Arrive by 2:00 PM)   Return Visit with Zulay Lopez MD   [facility] ([facility])    [address]  4th Floor  [place] [address] [phone]              Future tests that were ordered for you today     Open Future Orders        Priority Expected Expires Ordered    Rheumatoid factor Routine  8/21/2018 8/21/2017    Erythrocyte Sedimentation Rate ([place]) Routine  8/21/2018 8/21/2017    CRP inflammation Routine  8/21/2018 8/21/2017            Who to contact     Please call your clinic at [phone] to:    Ask questions about your health    Make or cancel appointments    Discuss your medicines    Learn about your test results    Speak to your doctor   If you have compliments or concerns about an experience at your clinic, or if you wish to file a complaint, please contact [facility] Patient Relations at [phone] or email us at [email or web address]         Additional Information About Your Visit        MyChart Information     MyChart gives you secure access to your electronic health record. If you see a primary care provider, you can also send messages to your care team and make appointments. If you have questions, please call your primary care clinic.  If you do not have a primary care provider, please call [phone] and they will assist you.      [employer] is an electronic  "gateway that provides easy, online access to your medical records. With [employer], you can request a clinic appointment, read your test results, renew a prescription or communicate with your care team.     To access your existing account, please contact your [facility] or call [phone] for assistance.        Care EveryWhere ID     This is your Care EveryWhere ID. This could be used by other organizations to access your [facility] medical records  [id number]        Your Vitals Were     Pulse Temperature Height Pulse Oximetry BMI (Body Mass Index)       71 98.8  F (37.1  C) (Oral) 6' 2.02\" (188 cm) 94% 33.25 kg/m2        Blood Pressure from Last 3 Encounters:   08/21/17 120/81   08/11/17 131/82   07/07/17 139/90    Weight from Last 3 Encounters:   08/21/17 259 lb 1.3 oz (117.5 kg)   08/11/17 261 lb (118.4 kg)   07/07/17 266 lb (120.7 kg)              We Performed the Following     CBC with platelets differential        Primary Care Provider Office Phone # Fax #    Abe Valencia [phone]       [address] [place] [address]        Equal Access to Services     ADÁN REBOLLEDO : Hadii velvet churcho Soeduarda, waaxda parvin, qaybta kaalmada adevitada, raffaele caraballo. So [facility] [phone].    ATENCIÓN: Si habla español, tiene a sharp disposición servicios gratuitos de asistencia lingüística. Llame al [phone].    We comply with applicable federal civil rights laws and [place] laws. We do not discriminate on the basis of race, color, national origin, age, disability sex, sexual orientation or gender identity.            Thank you!     Thank you for choosing [facility]  for your care. Our goal is always to provide you with excellent care. Hearing back from our patients is one way we can continue to improve our services. Please take a few minutes to complete the written survey that you may receive in the mail after your " visit with us. Thank you!             Your Updated Medication List - Protect others around you: Learn how to safely use, store and throw away your medicines at www.disposemymeds.org.          This list is accurate as of: 8/21/17  2:03 PM.  Always use your most recent med list.                   Brand Name Dispense Instructions for use Diagnosis    AMLODIPINE BESYLATE PO      Take by mouth daily        amoxicillin-clavulanate 875-125 MG per tablet    AUGMENTIN     Take 1 tablet by mouth 2 times daily        aspirin 81 MG chewable tablet      Take 81 mg by mouth daily.        cefpodoxime 100 MG tablet    VANTIN    6 tablet    Take 1 tablet (100 mg) by mouth 2 times daily    Elevated prostate specific antigen (PSA)       CIPROFLOXACIN PO      Take 500 mg by mouth 2 times daily        nitroGLYcerin 0.4 MG sublingual tablet    NITROSTAT    10 tablet    Place 1 tablet under the tongue every 5 minutes as needed for chest pain.    CAD (coronary artery disease)       sildenafil 100 MG cap/tab    VIAGRA    10 tablet    Take 1 tablet (100 mg) by mouth daily as needed for erectile dysfunction Do not take with NTG medication    Erectile dysfunction, unspecified erectile dysfunction type

## 2017-08-22 NOTE — PROGRESS NOTES
CHIEF COMPLAINT:  Hospital discharge followup.      HISTORY OF PRESENT ILLNESS:  Christophe is a 72-year-old here for the above.  He recently had a prostate biopsy performed for persistently elevated PSA level along with symptoms in [place] on 08/11.  He did fairly well.  He took antibiotics the day before, the day of, and the day after.  However, less than 24 hours after the biopsy, he started developing a fever and the involuntary shaking and chills.  He had been instructed very carefully that if anything like that were to happen, he needed to present to the hospital.  He did, was placed in the ICU before eventually being discharged on 08/16/2017.      He was sent home on 2 antibiotics, ciprofloxacin and Augmentin.  He has been taking them without fail.  He is on about day 6 at this point.      As a subsequent complication, his scrotum has enlarged and is quite uncomfortable.  He reported this to his urologist and it was suggested he go on another 10-day course of antibiotics.  As it turned out, he will be doing a Urology followup in 4 days and that will be day 10 of oral antibiotics.      his temperature reached a max of 102.  White count was up to 16,000.  He received IV antibiotics before being discharged.  The scrotal irritation and swelling is something that has occurred over the last couple of days.      In addition to that, he developed some arthralgias in both hands about a month and a half ago.  He has no idea what brought it on.  There is a family history with a brother who is 76 years old (5 years his elder) with rheumatoid arthritis.  He wonders if that might be what is going on.      Christophe has been very active.  He has had some intentional weight loss.  He is doing kick boxing for exercise and really wants to get back at that.  He has been doing that for about 5 months.      ACTIVE MEDICAL PROBLEMS:  Reviewed.      CURRENT MEDICATIONS:  Reviewed.      OBJECTIVE:  Christophe is in no distress.  Breathing  comfortably.  His color looks great.  No diaphoresis.  Absolutely no tachypnea.  BP is 120/81 with a heart rate of 71 and regular.  Temperature is 98.8.  He is 6 feet 2 and weighs 259 with a BMI of 33.25.  O2 sats are 94% on room air.  Examination of the scrotum reveals that it is enlarged, almost to the size of a baseball, with warm and red scrotal swelling.  I did not attempt to transilluminate it, but there is no doubt this is full of fluid.  He does not have too much pain with gentle palpation.  No evidence of a hernia.      LABORATORY DATA:  Labs today will consist just of a CBC with platelets and differential.  When symptoms have settled down, we will check a rheumatoid factor and sed rate.  We are not going to check them today as we know they will be elevated artificially.  We will also do a CRP inflammatory marker at that time.      ASSESSMENT AND PLAN:   1.  Hospital discharge followup for urosepsis, likely due to E. coli following prostate biopsy.  He was in the Intensive Care Unit, given IV antibiotics, went to a regular floor and then was discharged home.  He continues to be on 2 antibiotics (ciprofloxacin and Augmentin) twice daily.  Doing well.  A CBC with differential pending.   2.  Large hydrocele, other scrotal swelling.  I will send a head's up note to Urology before his visit on Friday.  It is unlikely they can do any drainage in the office, but I would like to let them know what is going on and describe it for them.   3.  Arthralgias, etiology not clear.  No real swelling seen in the hands today.  Careful examination reveals no synovial thickening between the metacarpophalangeal joints throughout the hands.  Markers of inflammation pending once his urologic situation settles down.  Call with any problems or questions.      Total time spent with Christophe today was over 25 minutes, more than 50% of that time was spent in care coordination and counseling.

## 2017-08-23 DIAGNOSIS — N40.1 BENIGN PROSTATIC HYPERPLASIA WITH LOWER URINARY TRACT SYMPTOMS, SYMPTOM DETAILS UNSPECIFIED: Primary | ICD-10-CM

## 2017-08-24 ASSESSMENT — PATIENT HEALTH QUESTIONNAIRE - PHQ9: SUM OF ALL RESPONSES TO PHQ QUESTIONS 1-9: 6

## 2017-08-25 ENCOUNTER — OFFICE VISIT (OUTPATIENT)
Dept: UROLOGY | Facility: CLINIC | Age: 72
End: 2017-08-25

## 2017-08-25 VITALS
HEART RATE: 65 BPM | BODY MASS INDEX: 33.37 KG/M2 | DIASTOLIC BLOOD PRESSURE: 87 MMHG | HEIGHT: 74 IN | SYSTOLIC BLOOD PRESSURE: 136 MMHG | WEIGHT: 260 LBS

## 2017-08-25 DIAGNOSIS — R35.1 NOCTURIA: Primary | ICD-10-CM

## 2017-08-25 DIAGNOSIS — C61 MALIGNANT NEOPLASM OF PROSTATE (H): ICD-10-CM

## 2017-08-25 RX ORDER — ALFUZOSIN HYDROCHLORIDE 10 MG/1
10 TABLET, EXTENDED RELEASE ORAL DAILY
Qty: 30 TABLET | Refills: 1 | Status: SHIPPED | OUTPATIENT
Start: 2017-08-25 | End: 2017-10-23

## 2017-08-25 RX ORDER — CIPROFLOXACIN 500 MG/1
500 TABLET, FILM COATED ORAL
COMMUNITY
Start: 2017-08-16 | End: 2017-08-26

## 2017-08-25 ASSESSMENT — PAIN SCALES - GENERAL: PAINLEVEL: NO PAIN (0)

## 2017-08-25 NOTE — LETTER
"2017       RE: Cy Seymour  [address]   [place] [address]     Dear Colleague,    Thank you for referring your patient, Cy Seymour, to the [facility] AND [facility] FOR PROSTATE AND UROLOGIC CANCERS at [facility]. Please see a copy of my visit note below.    UROLOGIC DIAGNOSES:     CURRENT INTERVENTIONS:       HISTORY:   Pt is a very pleasant 71 yo white male who presents to office today complaining of enlarged prostate and interested in MRI fusion biopsy. He states he has been followed by another urologist in the past and was diagnosed with an enlarged prostate. He reports his PSA has been monitored over past several years and has ranged 3-8. He also says he has undergone 1-2 MRIs of prostate in past with positive lesions but these results are unavailable at this time. He was considering a \"Urolift\" procedure but was told he should undergo prostate biospy prior to this intervention to rule out prostate cancer. He was schedule for biopsy at his previous urologist but opted to pursue MRI fusion biopsy with another provider.    His symptoms include nocturia, on average 5 times per night but as severe as 10 times per night. He also c/o of slow stream, starting and stopping, urgency with rare instances of leakage and incontinence. He does not wear pads/diapers. He has been prescribed medication for BPH in the past but has never taken anything.    Denies history of UTIs, kidney infections, or kidney stones.He reports a positive family hx of prostate cancer in father ( of CHF at 90 yo; tx unknown) and brother (living at 78 yo; tx with radiation).    Patient s/p prostate biopsy complicated by UTI with fever. Patient also with orchitis with reactive hydrocele. U/s today showed reactive hydrocele but no abscess.     We discussed the above.   Also discussed pathology. Patient noted to have focus of lesli 3+3 prostate cancer.   We discussed " ASDI.   Also discussed possible outlet procedure for urinary symptoms as noted above.     PAST MEDICAL HISTORY:   Past Medical History:   Diagnosis Date     ADHD (attention deficit hyperactivity disorder)      Aneurysm artery, iliac (H)     father had AAA surgery     Anxiety      Atrial fibrillation (H) 5/9/2011     Bicuspid aortic valve      Coronary artery disease     ablation 2011     Depressive disorder      Diverticulitis      Gastro-oesophageal reflux disease      Hearing loss      Hypertension      Polymyalgia rheumatica (H)      Restless legs      S/P nasal septoplasty     1998     Seasonal affective disorder (H)      Sleep apnea      Tachycardia     exercise initiated       PAST SURGICAL HISTORY:   Past Surgical History:   Procedure Laterality Date     BACK SURGERY      cervical fusion 2005     BLEPHAROPLASTY BILATERAL  2/27/2012    Procedure:BLEPHAROPLASTY BILATERAL; BILATERAL UPPER LID BLEPHAROPLASTY ; Surgeon:JESSE LINDSEY; Location:[facility]     CARDIAC SURGERY      stent 2003     ORTHOPEDIC SURGERY      bilat hip replaced 2008,2009     FAMILY HISTORY:   Family History   Problem Relation Age of Onset     Arthritis Mother      Hypertension Mother      Hypertension Father      Heart Failure Father      HEART DISEASE Maternal Grandfather      HEART DISEASE Paternal Grandmother        SOCIAL HISTORY:   Social History   Substance Use Topics     Smoking status: Never Smoker     Smokeless tobacco: Never Used     Alcohol use Yes      Comment: 1-2 alcoholic beverages per month       Current Outpatient Prescriptions   Medication     ciprofloxacin (CIPRO) 500 MG tablet     amoxicillin-clavulanate (AUGMENTIN) 875-125 MG per tablet     alfuzosin (UROXATRAL) 10 MG 24 hr tablet     CIPROFLOXACIN PO     amoxicillin-clavulanate (AUGMENTIN) 875-125 MG per tablet     cefpodoxime (VANTIN) 100 MG tablet     sildenafil (VIAGRA) 100 MG cap/tab     AMLODIPINE BESYLATE PO     aspirin 81 MG chewable tablet     nitroglycerin  "(NITROSTAT) 0.4 MG SL tablet     No current facility-administered medications for this visit.      PHYSICAL EXAM:  /87  Pulse 65  Ht 1.88 m (6' 2\")  Wt 117.9 kg (260 lb)  BMI 33.38 kg/m2    HEENT: Normocephalic and atraumatic   Cardiac: Not done  Back/Flank: Not done  CNS/PNS: Not done  Respiratory: Normal non-labored breathing  Abdomen: Soft nontender and nondistended  Peripheral Vascular: Not done  Mental Status: Not done    Penis: Not done  Scrotal Skin: Not done  Testicles: Not done  Epididymis: Not done  Digital Rectal Exam:     Cystoscopy: Not done    Imaging: None    Urinalysis: UA RESULTS:  Recent Labs   Lab Test  09/19/12   0959   COLOR  Yellow   APPEARANCE  Clear   URINEGLC  Negative   URINEBILI  Negative   URINEKETONE  Negative   SG  1.016   UBLD  Negative   URINEPH  5.0   PROTEIN  Negative   NITRITE  Negative   LEUKEST  Small*   RBCU  0   WBCU  2       PSA:     Post Void Residual:     Other labs: None today      IMPRESSION:  73 y/o M with lower urinary tract symptoms and grade group 1 prostate cancer   PLAN:  Send prostate biopsy results for decipher testing   Schedule for cystoscopy   Trial of alfuzosin until outlet procedure can be scheduled   Total Time: 15 minutes                                    Total in Consultation: greater than 50%     Again, thank you for allowing me to participate in the care of your patient.    Sincerely,  Zulay Lopez MD  "

## 2017-08-25 NOTE — PROGRESS NOTES
"UROLOGIC DIAGNOSES:     CURRENT INTERVENTIONS:       HISTORY:     Pt is a very pleasant 71 yo white male who presents to office today complaining of enlarged prostate and interested in MRI fusion biopsy. He states he has been followed by another urologist in the past and was diagnosed with an enlarged prostate. He reports his PSA has been monitored over past several years and has ranged 3-8. He also says he has undergone 1-2 MRIs of prostate in past with positive lesions but these results are unavailable at this time. He was considering a \"Urolift\" procedure but was told he should undergo prostate biospy prior to this intervention to rule out prostate cancer. He was schedule for biopsy at his previous urologist but opted to pursue MRI fusion biopsy with another provider.    His symptoms include nocturia, on average 5 times per night but as severe as 10 times per night. He also c/o of slow stream, starting and stopping, urgency with rare instances of leakage and incontinence. He does not wear pads/diapers. He has been prescribed medication for BPH in the past but has never taken anything.    Denies history of UTIs, kidney infections, or kidney stones.He reports a positive family hx of prostate cancer in father ( of CHF at 88 yo; tx unknown) and brother (living at 76 yo; tx with radiation).      Patient s/p prostate biopsy complicated by UTI with fever. Patient also with orchitis with reactive hydrocele. U/s today showed reactive hydrocele but no abscess.     We discussed the above.   Also discussed pathology. Patient noted to have focus of lesli 3+3 prostate cancer.   We discussed ASDI.   Also discussed possible outlet procedure for urinary symptoms as noted above.           PAST MEDICAL HISTORY:   Past Medical History:   Diagnosis Date     ADHD (attention deficit hyperactivity disorder)      Aneurysm artery, iliac (H)     father had AAA surgery     Anxiety      Atrial fibrillation (H) 2011     Bicuspid " "aortic valve      Coronary artery disease     ablation 2011     Depressive disorder      Diverticulitis      Gastro-oesophageal reflux disease      Hearing loss      Hypertension      Polymyalgia rheumatica (H)      Restless legs      S/P nasal septoplasty     1998     Seasonal affective disorder (H)      Sleep apnea      Tachycardia     exercise initiated       PAST SURGICAL HISTORY:   Past Surgical History:   Procedure Laterality Date     BACK SURGERY      cervical fusion 2005     BLEPHAROPLASTY BILATERAL  2/27/2012    Procedure:BLEPHAROPLASTY BILATERAL; BILATERAL UPPER LID BLEPHAROPLASTY ; Surgeon:CÉSAR, JESSE RAMIREZ; Location:[facility]     CARDIAC SURGERY      stent 2003     ORTHOPEDIC SURGERY      bilat hip replaced 2008,2009       FAMILY HISTORY:   Family History   Problem Relation Age of Onset     Arthritis Mother      Hypertension Mother      Hypertension Father      Heart Failure Father      HEART DISEASE Maternal Grandfather      HEART DISEASE Paternal Grandmother        SOCIAL HISTORY:   Social History   Substance Use Topics     Smoking status: Never Smoker     Smokeless tobacco: Never Used     Alcohol use Yes      Comment: 1-2 alcoholic beverages per month       Current Outpatient Prescriptions   Medication     ciprofloxacin (CIPRO) 500 MG tablet     amoxicillin-clavulanate (AUGMENTIN) 875-125 MG per tablet     alfuzosin (UROXATRAL) 10 MG 24 hr tablet     CIPROFLOXACIN PO     amoxicillin-clavulanate (AUGMENTIN) 875-125 MG per tablet     cefpodoxime (VANTIN) 100 MG tablet     sildenafil (VIAGRA) 100 MG cap/tab     AMLODIPINE BESYLATE PO     aspirin 81 MG chewable tablet     nitroglycerin (NITROSTAT) 0.4 MG SL tablet     No current facility-administered medications for this visit.          PHYSICAL EXAM:    /87  Pulse 65  Ht 1.88 m (6' 2\")  Wt 117.9 kg (260 lb)  BMI 33.38 kg/m2    HEENT: Normocephalic and atraumatic   Cardiac: Not done  Back/Flank: Not done  CNS/PNS: Not done  Respiratory: Normal " non-labored breathing  Abdomen: Soft nontender and nondistended  Peripheral Vascular: Not done  Mental Status: Not done    Penis: Not done  Scrotal Skin: Not done  Testicles: Not done  Epididymis: Not done  Digital Rectal Exam:     Cystoscopy: Not done    Imaging: None    Urinalysis: UA RESULTS:  Recent Labs   Lab Test  09/19/12   0959   COLOR  Yellow   APPEARANCE  Clear   URINEGLC  Negative   URINEBILI  Negative   URINEKETONE  Negative   SG  1.016   UBLD  Negative   URINEPH  5.0   PROTEIN  Negative   NITRITE  Negative   LEUKEST  Small*   RBCU  0   WBCU  2       PSA:     Post Void Residual:     Other labs: None today      IMPRESSION:  73 y/o M with lower urinary tract symptoms and grade group 1 prostate cancer     PLAN:  Send prostate biopsy results for decipher testing   Schedule for cystoscopy   Trial of alfuzosin until outlet procedure can be scheduled     Total Time: 15 minutes                                    Total in Consultation: greater than 50%

## 2017-08-25 NOTE — PATIENT INSTRUCTIONS
Return for a cystoscopy.    It was a pleasure meeting with you today.  Thank you for allowing me and my team the privilege of caring for you today.  YOU are the reason we are here, and I truly hope we provided you with the excellent service you deserve.  Please let us know if there is anything else we can do for you so that we can be sure you are leaving completely satisfied with your care experience.

## 2017-08-25 NOTE — MR AVS SNAPSHOT
After Visit Summary   8/25/2017    Cy Seymour    MRN: [id number]           Patient Information     Date Of Birth          1945        Visit Information        Provider Department      8/25/2017 2:15 PM Zulay Lopez MD [facility] Urology and [facility] for Prostate and Urologic Cancers        Today's Diagnoses     Nocturia    -  1    Malignant neoplasm of prostate (H)          Care Instructions    Return for a cystoscopy.    It was a pleasure meeting with you today.  Thank you for allowing me and my team the privilege of caring for you today.  YOU are the reason we are here, and I truly hope we provided you with the excellent service you deserve.  Please let us know if there is anything else we can do for you so that we can be sure you are leaving completely satisfied with your care experience.                  Follow-ups after your visit        Your next 10 appointments already scheduled     Sep 29, 2017 11:00 AM CDT   (Arrive by 10:45 AM)   Cystoscopy with Zulay Lopez MD   [facility] Urology and [facility] for Prostate and Urologic Cancers ([facility])    [address] [place] [address] [phone]              Who to contact     Please call your clinic at [phone] to:    Ask questions about your health    Make or cancel appointments    Discuss your medicines    Learn about your test results    Speak to your doctor   If you have compliments or concerns about an experience at your clinic, or if you wish to file a complaint, please contact [facility] Patient Relations at [phone] or email us at [email or web address]         Additional Information About Your Visit        MyChart Information     [employer] gives you secure access to your electronic health record. If you see a primary care provider, you can also send messages to your care team and make appointments. If you have  "questions, please call your primary care clinic.  If you do not have a primary care provider, please call [phone] and they will assist you.      [employer] is an electronic gateway that provides easy, online access to your medical records. With [employer], you can request a clinic appointment, read your test results, renew a prescription or communicate with your care team.     To access your existing account, please contact your [facility] or call [phone] for assistance.        Care EveryWhere ID     This is your Care EveryWhere ID. This could be used by other organizations to access your [facility] medical records  [id number]        Your Vitals Were     Pulse Height BMI (Body Mass Index)             65 1.88 m (6' 2\") 33.38 kg/m2          Blood Pressure from Last 3 Encounters:   08/25/17 136/87   08/21/17 120/81   08/11/17 131/82    Weight from Last 3 Encounters:   08/25/17 117.9 kg (260 lb)   08/21/17 117.5 kg (259 lb 1.3 oz)   08/11/17 118.4 kg (261 lb)              Today, you had the following     No orders found for display         Today's Medication Changes          These changes are accurate as of: 8/25/17 11:59 PM.  If you have any questions, ask your nurse or doctor.               Start taking these medicines.        Dose/Directions    alfuzosin 10 MG 24 hr tablet   Commonly known as:  UROXATRAL   Used for:  Nocturia   Started by:  Zulay Lopez MD        Dose:  10 mg   Take 1 tablet (10 mg) by mouth daily   Quantity:  30 tablet   Refills:  1            Where to get your medicines      These medications were sent to [facility] [address] [facility] [address] [address] [address]     Phone:  [phone]     alfuzosin 10 MG 24 hr tablet                Primary Care Provider Office Phone # Fax #    Abe Valencia [phone] [address] [place] [address]        Equal Access " to Services     ADÁN REBOLLEDO : Jessica Wahl, waalissonda juleemariannaha, qamain kaemersonraffaele esteves. So [facility] [phone].    ATENCIÓN: Si alexandriala steph, tiene a sharp disposición servicios gratuitos de asistencia lingüística. Llame al [phone].    We comply with applicable federal civil rights laws and [place] laws. We do not discriminate on the basis of race, color, national origin, age, disability sex, sexual orientation or gender identity.            Thank you!     Thank you for choosing [facility] UROLOGY AND [facility] FOR PROSTATE AND UROLOGIC CANCERS  for your care. Our goal is always to provide you with excellent care. Hearing back from our patients is one way we can continue to improve our services. Please take a few minutes to complete the written survey that you may receive in the mail after your visit with us. Thank you!             Your Updated Medication List - Protect others around you: Learn how to safely use, store and throw away your medicines at www.disposemymeds.org.          This list is accurate as of: 8/25/17 11:59 PM.  Always use your most recent med list.                   Brand Name Dispense Instructions for use Diagnosis    alfuzosin 10 MG 24 hr tablet    UROXATRAL    30 tablet    Take 1 tablet (10 mg) by mouth daily    Nocturia       AMLODIPINE BESYLATE PO      Take by mouth daily        * amoxicillin-clavulanate 875-125 MG per tablet    AUGMENTIN     Take 1 tablet by mouth 2 times daily        * amoxicillin-clavulanate 875-125 MG per tablet    AUGMENTIN     Take 875 mg by mouth        aspirin 81 MG chewable tablet      Take 81 mg by mouth daily.        cefpodoxime 100 MG tablet    VANTIN    6 tablet    Take 1 tablet (100 mg) by mouth 2 times daily    Elevated prostate specific antigen (PSA)       ciprofloxacin 500 MG tablet    CIPRO     Take 500 mg by mouth        CIPROFLOXACIN PO      Take 500 mg by mouth 2 times daily        nitroGLYcerin 0.4  MG sublingual tablet    NITROSTAT    10 tablet    Place 1 tablet under the tongue every 5 minutes as needed for chest pain.    CAD (coronary artery disease)       sildenafil 100 MG cap/tab    VIAGRA    10 tablet    Take 1 tablet (100 mg) by mouth daily as needed for erectile dysfunction Do not take with NTG medication    Erectile dysfunction, unspecified erectile dysfunction type       * Notice:  This list has 2 medication(s) that are the same as other medications prescribed for you. Read the directions carefully, and ask your doctor or other care provider to review them with you.

## 2017-09-05 DIAGNOSIS — M25.50 ARTHRALGIA, UNSPECIFIED JOINT: Primary | ICD-10-CM

## 2017-09-05 DIAGNOSIS — M25.50 ARTHRALGIA, UNSPECIFIED JOINT: ICD-10-CM

## 2017-09-05 DIAGNOSIS — M25.50 PAIN IN JOINT, MULTIPLE SITES: ICD-10-CM

## 2017-09-05 LAB
CRP SERPL-MCNC: 7.1 MG/L (ref 0–8)
ERYTHROCYTE [SEDIMENTATION RATE] IN BLOOD: 20 MM/HR (ref 0–20)
URATE SERPL-MCNC: 5.1 MG/DL (ref 3.5–7.2)

## 2017-09-06 DIAGNOSIS — M79.10 MYALGIA: Primary | ICD-10-CM

## 2017-09-06 LAB — RHEUMATOID FACT SER NEPH-ACNC: <20 IU/ML (ref 0–20)

## 2017-09-06 RX ORDER — PREDNISONE 20 MG/1
20 TABLET ORAL DAILY
Qty: 5 TABLET | Refills: 0 | Status: SHIPPED | OUTPATIENT
Start: 2017-09-06 | End: 2019-12-04

## 2017-09-15 PROCEDURE — 40000933 ZZHCL STATISTIC DECIPHER PROSTATE CANCER CLASSIFIER: Performed by: UROLOGY

## 2017-09-18 ENCOUNTER — PRE VISIT (OUTPATIENT)
Dept: UROLOGY | Facility: CLINIC | Age: 72
End: 2017-09-18

## 2017-09-18 NOTE — TELEPHONE ENCOUNTER
Patient is coming in to see  for a cystoscopy for lower urinary tract symptoms, no need for a call.

## 2017-09-21 ENCOUNTER — TRANSFERRED RECORDS (OUTPATIENT)
Dept: HEALTH INFORMATION MANAGEMENT | Facility: CLINIC | Age: 72
End: 2017-09-21

## 2017-09-29 ENCOUNTER — OFFICE VISIT (OUTPATIENT)
Dept: UROLOGY | Facility: CLINIC | Age: 72
End: 2017-09-29

## 2017-09-29 ENCOUNTER — ALLIED HEALTH/NURSE VISIT (OUTPATIENT)
Dept: UROLOGY | Facility: CLINIC | Age: 72
End: 2017-09-29

## 2017-09-29 VITALS
BODY MASS INDEX: 34.46 KG/M2 | WEIGHT: 260 LBS | HEART RATE: 79 BPM | SYSTOLIC BLOOD PRESSURE: 131 MMHG | HEIGHT: 73 IN | DIASTOLIC BLOOD PRESSURE: 83 MMHG

## 2017-09-29 DIAGNOSIS — N13.8 BPH WITH URINARY OBSTRUCTION: Primary | ICD-10-CM

## 2017-09-29 DIAGNOSIS — N40.1 BPH WITH URINARY OBSTRUCTION: Primary | ICD-10-CM

## 2017-09-29 RX ORDER — CEFAZOLIN SODIUM 1 G/3ML
1 INJECTION, POWDER, FOR SOLUTION INTRAMUSCULAR; INTRAVENOUS SEE ADMIN INSTRUCTIONS
Status: CANCELLED | OUTPATIENT
Start: 2017-09-29

## 2017-09-29 ASSESSMENT — PAIN SCALES - GENERAL
PAINLEVEL: NO PAIN (0)
PAINLEVEL: NO PAIN (0)

## 2017-09-29 NOTE — NURSING NOTE
Pre Op Teaching Flowsheet       Pre and Post op Patient Education  Relevant Diagnosis:        Motivation Level:  Asks Questions: Yes  Eager to Learn:  Yes  Cooperative: Yes  Receptive (willing/able to accept information):  Yes  Patient demonstrates understanding of the following:  Date and time of surgery:  October 30th at 1:30pm  Location of surgery: [address] [place] [address]  History and Physical and any other testing necessary prior to surgery: Yes  Required time line for completion of History and Physical and any pre-op testing: Yes    NPO Guidelines: Nothing to eat 8 hours prior to surgery. Can have clear liquids up to 2 hours prior to sugery    Patient demonstrates understanding of the following:  Pre-op bowel prep: N/A  Pre-op showering/scrub information with Hibiclens Soap: Yes  Medications to take the day of surgery:  Per PCP  Blood thinner medications discussed and when to stop (if applicable):  Yes  Diabetes medication management (if applicable):  N/A  Discussed pain control after surgery: pain scale, pain medications and pain management techniques  Infection Prevention: Patient demonstrates understanding of the following:  Patient instructed on hand hygiene:  Yes  Surgical procedure site care taught: No  Signs and symptoms of infection taught:  Yes  Wound care will be taught at the time of discharge.  Central venous catheter care will be taught at the time of discharge (if applicable).    Post-op follow-up:  Discussed how to contact the hospital, nurse, and clinic scheduling staff if necessary.    Instructional materials used/given/mailed:  [facility] Booklet, post op teaching sheet, Map, Soap, and arrival/location information.    Surgical instructions given to patient in clinic: Yes.    Instructional Materials given:  Before your surgery packet , Medications to avoid before surgery , Showering or Bathing instructions before surgery  and What to expect after surgery  Total time with patient: 10  minutes    Reta Gillis RN

## 2017-09-29 NOTE — PROGRESS NOTES
PRE-PROCEDURE DIAGNOSIS: lower urinary tract symptoms including intermittent stream   POST-PROCEDURE DIAGNOSIS: lower urinary tract symptoms, intermittent stream   PROCEDURE: Cystoscopy  HISTORY: Cy Seymour is a 72 year old male with lower urinary tract symptoms.   REVIEW OF OFFICE STUDIES (e.g., uroflow or PVR): MRI prostate showed 90 gram prostate   DESCRIPTION OF PROCEDURE: After informed consent was obtained, the patient was brought to the procedure room where he was placed in the supine position with all pressure points well padded.  The penis and scrotum were prepped and draped in a sterile fashion. A flexible cystoscope was introduced through a well-lubricated urethra. Anterior urethra strictures were absent.   The urinary sphincter was intact.  The prostate demonstrated bilobar mild hypertrophy.  Bladder neck was open.   Bladder signififcant for presence of the following:      Diverticuli: absent      Cellules: absent      Trabeculation: present 0-1      Tumors: absent      Stones: absent  The flexible cystoscope was removed and the findings were described to the patient.   ASSESSMENT AND PLAN: 72 year old male with lower urinary tract symptoms and would like outlet procedure.   Will schedule for TURP next available

## 2017-09-29 NOTE — LETTER
9/29/2017       RE: Cy Seymour  [address]   [facility] [address]     Dear Colleague,    Thank you for referring your patient, Cy Seymour, to the [facility] UROLOGY AND INST FOR PROSTATE AND UROLOGIC CANCERS at [facility]. Please see a copy of my visit note below.      PRE-PROCEDURE DIAGNOSIS: lower urinary tract symptoms including intermittent stream   POST-PROCEDURE DIAGNOSIS: lower urinary tract symptoms, intermittent stream   PROCEDURE: Cystoscopy  HISTORY: Cy Seymour is a 72 year old male with lower urinary tract symptoms.   REVIEW OF OFFICE STUDIES (e.g., uroflow or PVR): MRI prostate showed 90 gram prostate   DESCRIPTION OF PROCEDURE: After informed consent was obtained, the patient was brought to the procedure room where he was placed in the supine position with all pressure points well padded.  The penis and scrotum were prepped and draped in a sterile fashion. A flexible cystoscope was introduced through a well-lubricated urethra. Anterior urethra strictures were absent.   The urinary sphincter was intact.  The prostate demonstrated bilobar mild hypertrophy.  Bladder neck was open.   Bladder signififcant for presence of the following:      Diverticuli: absent      Cellules: absent      Trabeculation: present 0-1      Tumors: absent      Stones: absent  The flexible cystoscope was removed and the findings were described to the patient.   ASSESSMENT AND PLAN: 72 year old male with lower urinary tract symptoms and would like outlet procedure.   Will schedule for TURP next available     Again, thank you for allowing me to participate in the care of your patient.      Sincerely,    Zulay Lopez MD

## 2017-09-29 NOTE — NURSING NOTE
Invasive Procedure Safety Checklist:    Procedure: cysto     Action: Complete sections and checkboxes as appropriate.    Pre-procedure:  1. Patient ID Verified with 2 identifiers (Joana and  or MRN) : YES    2. Procedure and site verified with patient/designee (when able) : YES    3. Accurate consent documentation in medical record : YES    4. H&P (or appropriate assessment) documented in medical record : NO  H&P must be up to 30 days prior to procedure an updated within 24 hours of                 Procedure as applicable.     5. Relevant diagnostic and radiology test results appropriately labeled and displayed as applicable : YES    6. Blood products, implants, devices, and/or special equipment available for the procedure as applicable : YES    7. Procedure site(s) marked with provider initials [Exclusions: none] : NO    8. Marking not required. Reason : No    Time Out:     Time-Out performed immediately prior to starting procedure, including verbal and active participation of all team members addressing: YES    1. Correct patient identity.  2. Confirmed that the correct side and site are marked.  3. An accurate procedure to be done.  4. Agreement on the procedure to be done.  5. Correct patient position.  6. Relevant images and results are properly labeled and appropriately displayed.  7. The need to administer antibiotics or fluids for irrigation purposes during the procedure as applicable.  8. Safety precautions based on patient history or medication use.    During Procedure: Verification of correct person, site, and procedure occurs any time the responsibility for care of the patient is transferred to another member of the care team.

## 2017-09-29 NOTE — MR AVS SNAPSHOT
After Visit Summary   9/29/2017    Cy Seymour    MRN: [id number]           Patient Information     Date Of Birth          1945        Visit Information        Provider Department      9/29/2017 12:30 PM Nurse, [facility] Urology and [facility] for Prostate and Urologic Cancers        Today's Diagnoses     BPH with urinary obstruction    -  1       Follow-ups after your visit        Your next 10 appointments already scheduled     Oct 30, 2017   Procedure with Zulay Lopez MD   [facility] [place], Same Day Surgery (--)    [address] [facility] [place] [id number] [phone]              Who to contact     Please call your clinic at [phone] to:    Ask questions about your health    Make or cancel appointments    Discuss your medicines    Learn about your test results    Speak to your doctor   If you have compliments or concerns about an experience at your clinic, or if you wish to file a complaint, please contact [facility] Patient Relations at [phone] or email us at [email or web address]         Additional Information About Your Visit        MyChart Information     [employer] gives you secure access to your electronic health record. If you see a primary care provider, you can also send messages to your care team and make appointments. If you have questions, please call your primary care clinic.  If you do not have a primary care provider, please call [phone] and they will assist you.      [employer] is an electronic gateway that provides easy, online access to your medical records. With [employer], you can request a clinic appointment, read your test results, renew a prescription or communicate with your care team.     To access your existing account, please contact your [facility] or call [phone] for assistance.        Care EveryWhere ID     This is your Care EveryWhere ID. This could be  used by other organizations to access your [facility] medical records  [id number]         Blood Pressure from Last 3 Encounters:   09/29/17 131/83   08/25/17 136/87   08/21/17 120/81    Weight from Last 3 Encounters:   09/29/17 117.9 kg (260 lb)   08/25/17 117.9 kg (260 lb)   08/21/17 117.5 kg (259 lb 1.3 oz)              Today, you had the following     No orders found for display       Primary Care Provider Office Phone # Fax #    Abe Valencia [phone] [address] [place] [address]        Equal Access to Services     [facility] : Hadii velevt churcho Soeduarda, waaxda luqadaha, qaybta kaalmada genoyada, raffaele west . So [facility] [phone].    ATENCIÓN: Si habla español, tiene a sharp disposición servicios gratuitos de asistencia lingüística. Llame al [phone].    We comply with applicable federal civil rights laws and [place] laws. We do not discriminate on the basis of race, color, national origin, age, disability, sex, sexual orientation, or gender identity.            Thank you!     Thank you for choosing [facility] UROLOGY AND [facility] FOR PROSTATE AND UROLOGIC CANCERS  for your care. Our goal is always to provide you with excellent care. Hearing back from our patients is one way we can continue to improve our services. Please take a few minutes to complete the written survey that you may receive in the mail after your visit with us. Thank you!             Your Updated Medication List - Protect others around you: Learn how to safely use, store and throw away your medicines at www.disposemymeds.org.          This list is accurate as of: 9/29/17 12:45 PM.  Always use your most recent med list.                   Brand Name Dispense Instructions for use Diagnosis    alfuzosin 10 MG 24 hr tablet    UROXATRAL    30 tablet    Take 1 tablet (10 mg) by mouth daily    Nocturia       AMLODIPINE BESYLATE PO      Take by mouth daily         amoxicillin-clavulanate 875-125 MG per tablet    AUGMENTIN     Take 1 tablet by mouth 2 times daily        aspirin 81 MG chewable tablet      Take 81 mg by mouth daily.        cefpodoxime 100 MG tablet    VANTIN    6 tablet    Take 1 tablet (100 mg) by mouth 2 times daily    Elevated prostate specific antigen (PSA)       nitroGLYcerin 0.4 MG sublingual tablet    NITROSTAT    10 tablet    Place 1 tablet under the tongue every 5 minutes as needed for chest pain.    CAD (coronary artery disease)       predniSONE 20 MG tablet    DELTASONE    5 tablet    Take 1 tablet (20 mg) by mouth daily    Myalgia       sildenafil 100 MG tablet    VIAGRA    10 tablet    Take 1 tablet (100 mg) by mouth daily as needed for erectile dysfunction Do not take with NTG medication    Erectile dysfunction, unspecified erectile dysfunction type

## 2017-10-03 LAB — LAB SCANNED RESULT: NORMAL

## 2017-10-04 ENCOUNTER — TRANSFERRED RECORDS (OUTPATIENT)
Dept: HEALTH INFORMATION MANAGEMENT | Facility: CLINIC | Age: 72
End: 2017-10-04

## 2017-10-05 ENCOUNTER — TRANSFERRED RECORDS (OUTPATIENT)
Dept: HEALTH INFORMATION MANAGEMENT | Facility: CLINIC | Age: 72
End: 2017-10-05

## 2017-10-11 ENCOUNTER — TRANSFERRED RECORDS (OUTPATIENT)
Dept: HEALTH INFORMATION MANAGEMENT | Facility: CLINIC | Age: 72
End: 2017-10-11

## 2017-10-23 DIAGNOSIS — R35.1 NOCTURIA: ICD-10-CM

## 2017-10-23 RX ORDER — ALFUZOSIN HYDROCHLORIDE 10 MG/1
TABLET, EXTENDED RELEASE ORAL
Qty: 30 TABLET | Refills: 0 | Status: SHIPPED | OUTPATIENT
Start: 2017-10-23 | End: 2017-11-16

## 2017-11-08 ENCOUNTER — TRANSFERRED RECORDS (OUTPATIENT)
Dept: HEALTH INFORMATION MANAGEMENT | Facility: CLINIC | Age: 72
End: 2017-11-08

## 2017-11-16 DIAGNOSIS — R35.1 NOCTURIA: ICD-10-CM

## 2017-11-16 RX ORDER — ALFUZOSIN HYDROCHLORIDE 10 MG/1
TABLET, EXTENDED RELEASE ORAL
Qty: 30 TABLET | Refills: 3 | Status: SHIPPED | OUTPATIENT
Start: 2017-11-16 | End: 2018-08-21

## 2017-12-01 ENCOUNTER — TRANSFERRED RECORDS (OUTPATIENT)
Dept: HEALTH INFORMATION MANAGEMENT | Facility: CLINIC | Age: 72
End: 2017-12-01

## 2017-12-24 ENCOUNTER — TRANSFERRED RECORDS (OUTPATIENT)
Dept: HEALTH INFORMATION MANAGEMENT | Facility: CLINIC | Age: 72
End: 2017-12-24

## 2017-12-28 ENCOUNTER — OFFICE VISIT (OUTPATIENT)
Dept: FAMILY MEDICINE | Facility: CLINIC | Age: 72
End: 2017-12-28
Payer: COMMERCIAL

## 2017-12-28 VITALS
WEIGHT: 266 LBS | TEMPERATURE: 98 F | HEART RATE: 62 BPM | SYSTOLIC BLOOD PRESSURE: 130 MMHG | OXYGEN SATURATION: 94 % | DIASTOLIC BLOOD PRESSURE: 83 MMHG | BODY MASS INDEX: 35.09 KG/M2

## 2017-12-28 DIAGNOSIS — J20.0 ACUTE BRONCHITIS DUE TO MYCOPLASMA PNEUMONIAE: ICD-10-CM

## 2017-12-28 DIAGNOSIS — R05.3 PERSISTENT COUGH FOR 3 WEEKS OR LONGER: Primary | ICD-10-CM

## 2017-12-28 RX ORDER — ERGOCALCIFEROL 1.25 MG/1
50000 CAPSULE, LIQUID FILLED ORAL WEEKLY
COMMUNITY
Start: 2017-10-11 | End: 2019-04-02

## 2017-12-28 RX ORDER — ALBUTEROL SULFATE 90 UG/1
2 AEROSOL, METERED RESPIRATORY (INHALATION) EVERY 4 HOURS PRN
COMMUNITY
Start: 2017-12-24 | End: 2019-04-02

## 2017-12-28 RX ORDER — AZITHROMYCIN 250 MG/1
TABLET, FILM COATED ORAL
Qty: 6 TABLET | Refills: 0 | Status: SHIPPED | OUTPATIENT
Start: 2017-12-28 | End: 2018-08-21

## 2017-12-28 ASSESSMENT — PAIN SCALES - GENERAL: PAINLEVEL: NO PAIN (0)

## 2017-12-28 NOTE — MR AVS SNAPSHOT
After Visit Summary   12/28/2017    Cy Seymour    MRN: [id number]           Patient Information     Date Of Birth          1945        Visit Information        Provider Department      12/28/2017 11:20 AM Abe Valencia MD [facility]        Today's Diagnoses     Persistent cough for 3 weeks or longer    -  1    Acute bronchitis due to Mycoplasma pneumoniae           Follow-ups after your visit        Your next 10 appointments already scheduled     Apr 19, 2018 10:00 AM CDT   Return Visit with Zulay Lopez MD   [facility] [place] ([facility] [place])    [address]  [address]  [facility] [address] [phone]              Who to contact     Please call your clinic at [phone] to:    Ask questions about your health    Make or cancel appointments    Discuss your medicines    Learn about your test results    Speak to your doctor   If you have compliments or concerns about an experience at your clinic, or if you wish to file a complaint, please contact [facility] Patient Relations at [phone] or email us at [email or web address]         Additional Information About Your Visit        enercasthart Information     [employer] gives you secure access to your electronic health record. If you see a primary care provider, you can also send messages to your care team and make appointments. If you have questions, please call your primary care clinic.  If you do not have a primary care provider, please call [phone] and they will assist you.      [facility] is an electronic gateway that provides easy, online access to your medical records. With [facility], you can request a clinic appointment, read your test results, renew a prescription or communicate with your care team.     To access your existing account, please contact your [facility] or call [phone] for  assistance.        Care EveryWhere ID     This is your Care EveryWhere ID. This could be used by other organizations to access your [place] medical records  [id number]        Your Vitals Were     Pulse Temperature Pulse Oximetry BMI (Body Mass Index)          62 98  F (36.7  C) (Oral) 94% 35.09 kg/m2         Blood Pressure from Last 3 Encounters:   12/28/17 130/83   09/29/17 131/83   08/25/17 136/87    Weight from Last 3 Encounters:   12/28/17 266 lb (120.7 kg)   09/29/17 260 lb (117.9 kg)   08/25/17 260 lb (117.9 kg)              Today, you had the following     No orders found for display         Today's Medication Changes          These changes are accurate as of: 12/28/17  2:03 PM.  If you have any questions, ask your nurse or doctor.               Start taking these medicines.        Dose/Directions    azithromycin 250 MG tablet   Commonly known as:  ZITHROMAX   Used for:  Persistent cough for 3 weeks or longer, Acute bronchitis due to Mycoplasma pneumoniae   Started by:  Abe Valencia MD        Two tablets first day, then one tablet daily for four days.   Quantity:  6 tablet   Refills:  0         These medicines have changed or have updated prescriptions.        Dose/Directions    predniSONE 20 MG tablet   Commonly known as:  DELTASONE   This may have changed:  how much to take   Used for:  Myalgia        Dose:  20 mg   Take 1 tablet (20 mg) by mouth daily   Quantity:  5 tablet   Refills:  0            Where to get your medicines      These medications were sent to [facility] [address] [place] [address]     Phone:  [phone]     azithromycin 250 MG tablet                Primary Care Provider Office Phone # Fax #    Abe Valencia [phone] [address] [place] [address]        Equal Access to Services     ADÁN REBOLLEDO AH: bryce [place] qaybta  raffaele ruffevaristo west ah. [facility] [phone].    ATENCIÓN: Si aleah choi, tiene a sharp disposición servicios gratuitos de asistencia lingüística. Citlali al [phone].    We comply with applicable federal civil rights laws and [place] laws. We do not discriminate on the basis of race, color, national origin, age, disability, sex, sexual orientation, or gender identity.            Thank you!     Thank you for choosing [facility]  for your care. Our goal is always to provide you with excellent care. Hearing back from our patients is one way we can continue to improve our services. Please take a few minutes to complete the written survey that you may receive in the mail after your visit with us. Thank you!             Your Updated Medication List - Protect others around you: Learn how to safely use, store and throw away your medicines at www.disposemymeds.org.          This list is accurate as of: 12/28/17  2:03 PM.  Always use your most recent med list.                   Brand Name Dispense Instructions for use Diagnosis    albuterol 108 (90 BASE) MCG/ACT Inhaler    PROAIR HFA/PROVENTIL HFA/VENTOLIN HFA     Inhale 2 puffs into the lungs every 4 hours as needed        alfuzosin 10 MG 24 hr tablet    UROXATRAL    30 tablet    TAKE 1 TABLET BY MOUTH DAILY    Nocturia       AMLODIPINE BESYLATE PO      Take by mouth daily        aspirin 81 MG chewable tablet      Take 81 mg by mouth daily.        azithromycin 250 MG tablet    ZITHROMAX    6 tablet    Two tablets first day, then one tablet daily for four days.    Persistent cough for 3 weeks or longer, Acute bronchitis due to Mycoplasma pneumoniae       cefpodoxime 100 MG tablet    VANTIN    6 tablet    Take 1 tablet (100 mg) by mouth 2 times daily    Elevated prostate specific antigen (PSA)       METHOTREXATE PO      Take 15 mg by mouth once a week        nitroGLYcerin 0.4 MG sublingual tablet    NITROSTAT    10 tablet     Place 1 tablet under the tongue every 5 minutes as needed for chest pain.    CAD (coronary artery disease)       predniSONE 20 MG tablet    DELTASONE    5 tablet    Take 1 tablet (20 mg) by mouth daily    Myalgia       sildenafil 100 MG tablet    VIAGRA    10 tablet    Take 1 tablet (100 mg) by mouth daily as needed for erectile dysfunction Do not take with NTG medication    Erectile dysfunction, unspecified erectile dysfunction type       vitamin D 29202 UNIT capsule    ERGOCALCIFEROL     Take 50,000 Units by mouth once a week

## 2017-12-28 NOTE — PROGRESS NOTES
CHIEF COMPLAINT:  Persistent cough.      HISTORY OF PRESENT ILLNESS:  Christophe is a 72-year-old here for the above.  He started getting sick with what he felt was a virus about a month ago.  Then on 12/23 he was coughing so bad that he went to the emergency room/urgent care and was worked up.  He had a negative chest x-ray for pneumonia, his EKG was normal and he had some blood work (results not known) but apparently was normal.  They diagnosed him as having bronchitis and simply given a metered dose inhaler.  He has been using it, but it really has not helped much.        Christophe has a history of atrial fibrillation, coronary artery disease, major depression, recurrent, in the context of some ADHD and essential hypertension.  In addition, he is being seen by Rheumatology and is currently on methotrexate 15 mg once weekly along with the taper off prednisone, currently on 10 mg daily.  Other meds reviewed.      At this point, he continues to cough.  His voice has a laryngitic quality to it.  His left ear is bothering him a bit as of the last day or two.  He is just looking for some help to try and take care of his current symptoms.      OBJECTIVE:  Christophe is in no distress.  BP is 130/83 with a pulse of 62 and regular.  Temperature is 98.  He weighs 266 pounds with a BMI of 35.1.  O2 sats are 94% on room air.  His skin color is pink.  He is breathing comfortably.  Voice has a slight laryngitis like quality to it.  He is not working hard to breathe and there is certainly no tachypnea.  No audible wheezes or crackles are heard.  Examination reveals no pain with palpation of the frontal or maxillary sinuses.  His ears show some erythema on the right tympanic membrane inferiorly.  The left TM looks entirely normal, although that is the side that he is having more discomfort on.  Nose is free of any congestion or discharge.  Mucous membranes are moist.  Throat looks entirely benign.  No obvious postnasal drip.  Minimal to no  erythema.  Examination of the neck reveals no anterior or posterior chain adenopathy.  Lungs are completely clear to auscultation bilaterally.  No wheezes, crackles or rhonchi are heard.  No prolonged expiratory ratio.      ASSESSMENT AND PLAN:     1. Cough for nearly a month.  It has not gotten better.  He had a recent negative chest x-ray and normal EKG in the context of normal labs.  He was diagnosed with bronchitis and an albuterol inhaler has done little to help him.  Therefore, at this point, given that he has been coughing for more than 3 weeks, I am going to go ahead and treat for mycoplasma and we will treat him with a Z-ALEXY to be used as directed.  He will get a starting dose in today and take it once daily for 4 days thereafter.  Potential side effects discussed.  He has no allergies to this.  I am quite optimistic that this will help turn the corner and help decrease the inflammation that he clearly has in his lungs.     2. He will call with any problems or questions in the meantime.

## 2017-12-28 NOTE — NURSING NOTE
72 year old  Chief Complaint   Patient presents with     Hospital F/U     ED visit 12/23- Bronchitis       Blood pressure 130/83, pulse 62, temperature 98  F (36.7  C), temperature source Oral, weight 266 lb (120.7 kg), SpO2 94 %. Body mass index is 35.09 kg/(m^2).  Patient Active Problem List   Diagnosis     Atrial fibrillation (H)     ADHD (attention deficit hyperactivity disorder)     CAD (coronary artery disease)     Insomnia     Major depression, recurrent (H)     BPH (benign prostatic hyperplasia)     Hypertension     Peripheral neuropathy     Preventative health care     Bilateral sensorineural hearing loss     Essential hypertension with goal blood pressure less than 140/90     Hyperlipidemia LDL goal <100       Wt Readings from Last 2 Encounters:   12/28/17 266 lb (120.7 kg)   09/29/17 260 lb (117.9 kg)     BP Readings from Last 3 Encounters:   12/28/17 130/83   09/29/17 131/83   08/25/17 136/87         Current Outpatient Prescriptions   Medication     METHOTREXATE PO     albuterol (PROAIR HFA/PROVENTIL HFA/VENTOLIN HFA) 108 (90 BASE) MCG/ACT Inhaler     vitamin D (ERGOCALCIFEROL) 28542 UNIT capsule     alfuzosin (UROXATRAL) 10 MG 24 hr tablet     predniSONE (DELTASONE) 20 MG tablet     sildenafil (VIAGRA) 100 MG cap/tab     AMLODIPINE BESYLATE PO     aspirin 81 MG chewable tablet     nitroglycerin (NITROSTAT) 0.4 MG SL tablet     amoxicillin-clavulanate (AUGMENTIN) 875-125 MG per tablet     cefpodoxime (VANTIN) 100 MG tablet     No current facility-administered medications for this visit.        Social History   Substance Use Topics     Smoking status: Never Smoker     Smokeless tobacco: Never Used     Alcohol use Yes      Comment: 1-2 alcoholic beverages per month       Health Maintenance Due   Topic Date Due     DEXA Q3 YR  06/15/1976     ADVANCE DIRECTIVE PLANNING Q5 YRS  06/15/2001     FALL RISK ASSESSMENT  06/15/2011     INFLUENZA VACCINE (SYSTEM ASSIGNED)  09/01/2017     LIPID MONITORING Q1 YEAR   09/06/2017     PNEUMOCOCCAL (2 of 2 - PPSV23) 09/06/2017     PHQ-9 Q1 MONTH  09/21/2017     PHQ-9 Q3 MONTHS  11/21/2017       No results found for: PAP      December 28, 2017 11:28 AM

## 2018-01-24 ENCOUNTER — TRANSFERRED RECORDS (OUTPATIENT)
Dept: HEALTH INFORMATION MANAGEMENT | Facility: CLINIC | Age: 73
End: 2018-01-24

## 2018-04-03 DIAGNOSIS — Z85.46 H/O PROSTATE CANCER: Primary | ICD-10-CM

## 2018-04-05 ENCOUNTER — TRANSFERRED RECORDS (OUTPATIENT)
Dept: HEALTH INFORMATION MANAGEMENT | Facility: CLINIC | Age: 73
End: 2018-04-05

## 2018-04-06 DIAGNOSIS — Z85.46 H/O PROSTATE CANCER: ICD-10-CM

## 2018-04-06 LAB — PSA SERPL-MCNC: 3.57 UG/L (ref 0–4)

## 2018-04-19 ENCOUNTER — OFFICE VISIT (OUTPATIENT)
Dept: UROLOGY | Facility: CLINIC | Age: 73
End: 2018-04-19
Payer: COMMERCIAL

## 2018-04-19 VITALS
HEIGHT: 74 IN | OXYGEN SATURATION: 97 % | WEIGHT: 260 LBS | DIASTOLIC BLOOD PRESSURE: 80 MMHG | HEART RATE: 61 BPM | BODY MASS INDEX: 33.37 KG/M2 | SYSTOLIC BLOOD PRESSURE: 130 MMHG

## 2018-04-19 DIAGNOSIS — C61 MALIGNANT NEOPLASM OF PROSTATE (H): Primary | ICD-10-CM

## 2018-04-19 DIAGNOSIS — R35.0 URINARY FREQUENCY: ICD-10-CM

## 2018-04-19 PROCEDURE — 99213 OFFICE O/P EST LOW 20 MIN: CPT | Performed by: UROLOGY

## 2018-04-19 RX ORDER — DIAZEPAM 10 MG
10 TABLET ORAL EVERY 6 HOURS PRN
Qty: 2 TABLET | Refills: 0 | Status: SHIPPED | OUTPATIENT
Start: 2018-04-19 | End: 2019-04-02

## 2018-04-19 ASSESSMENT — PAIN SCALES - GENERAL: PAINLEVEL: MILD PAIN (2)

## 2018-04-19 NOTE — MR AVS SNAPSHOT
After Visit Summary   4/19/2018    Cy Seymour    MRN: [id number]           Patient Information     Date Of Birth          1945        Visit Information        Provider Department      4/19/2018 10:00 AM Zulay Lopez MD [facility] [place]        Today's Diagnoses     Malignant neoplasm of prostate (H)    -  1       Follow-ups after your visit        Your next 10 appointments already scheduled     Aug 13, 2018 11:00 AM CDT   (Arrive by 10:45 AM)   MR PROSTATE with UUMR2   [facility], [place], MRI ([facility], [facility])    [address] [address] [phone]           Take your medicines as usual, unless your doctor tells you not to. Bring a list of your current medicines to your exam (including vitamins, minerals and over-the-counter drugs).  You may or may not receive intravenous (IV) contrast for this exam pending the discretion of the Radiologist.  You do not need to do anything special to prepare.  The MRI machine uses a strong magnet. Please wear clothes without metal (snaps, zippers). A sweatsuit works well, or we may give you a hospital gown.  Please remove any body piercings and hair extensions before you arrive. You will also remove watches, jewelry, hairpins, wallets, dentures, partial dental plates and hearing aids. You may wear contact lenses, and you may be able to wear your rings. We have a safe place to keep your personal items, but it is safer to leave them at home.  **IMPORTANT** THE INSTRUCTIONS BELOW ARE ONLY FOR THOSE PATIENTS WHO HAVE BEEN PRESCRIBED SEDATION OR GENERAL ANESTHESIA DURING THEIR MRI PROCEDURE:  IF YOUR DOCTOR PRESCRIBED ORAL SEDATION (take medicine to help you relax during your exam):   You must get the medicine from your doctor (oral medication) before you arrive. Bring the medicine to the exam. Do not take it at home. You ll be told when to  take it upon arriving for your exam.   Arrive one hour early. Bring someone who can take you home after the test. Your medicine will make you sleepy. After the exam, you may not drive, take a bus or take a taxi by yourself.  IF YOUR DOCTOR PRESCRIBED IV SEDATION:   Arrive one hour early. Bring someone who can take you home after the test. Your medicine will make you sleepy. After the exam, you may not drive, take a bus or take a taxi by yourself.   No eating 6 hours before your exam. You may have clear liquids up until 4 hours before your exam. (Clear liquids include water, clear tea, black coffee and fruit juice without pulp.)  IF YOUR DOCTOR PRESCRIBED ANESTHESIA (be asleep for your exam):   Arrive 1 1/2 hours early. Bring someone who can take you home after the test. You may not drive, take a bus or take a taxi by yourself.   No eating 8 hours before your exam. You may have clear liquids up until 4 hours before your exam. (Clear liquids include water, clear tea, black coffee and fruit juice without pulp.)   You will spend four to five hours in the recovery room.  Please call the Imaging Department at your exam site with any questions.            Aug 21, 2018 10:00 AM CDT   Return Visit with Zulay Lopez MD   [facility] [place] ([facility] [place])    [address]  [address]  [facility] [id number] [phone]              Future tests that were ordered for you today     Open Future Orders        Priority Expected Expires Ordered    MR Prostate [[id number]] Routine  4/19/2019 4/19/2018    MRI Prostate Routine  4/19/2019 4/19/2018            Who to contact     If you have questions or need follow up information about today's clinic visit or your schedule please contact [facility] [place] directly at [phone].  Normal or non-critical lab and imaging results will be communicated to you by MyChart, letter or phone within 4  "business days after the clinic has received the results. If you do not hear from us within 7 days, please contact the clinic through [employer] or phone. If you have a critical or abnormal lab result, we will notify you by phone as soon as possible.  Submit refill requests through [employer] or call your pharmacy and they will forward the refill request to us. Please allow 3 business days for your refill to be completed.          Additional Information About Your Visit        [employer] Information     [employer] gives you secure access to your electronic health record. If you see a primary care provider, you can also send messages to your care team and make appointments. If you have questions, please call your primary care clinic.  If you do not have a primary care provider, please call [phone] and they will assist you.        Care EveryWhere ID     This is your Care EveryWhere ID. This could be used by other organizations to access your [place] medical records  [id number]        Your Vitals Were     Pulse Height Pulse Oximetry BMI (Body Mass Index)          61 1.88 m (6' 2\") 97% 33.38 kg/m2         Blood Pressure from Last 3 Encounters:   04/19/18 130/80   12/28/17 130/83   09/29/17 131/83    Weight from Last 3 Encounters:   04/19/18 117.9 kg (260 lb)   12/28/17 120.7 kg (266 lb)   09/29/17 117.9 kg (260 lb)                 Today's Medication Changes          These changes are accurate as of 4/19/18 11:12 AM.  If you have any questions, ask your nurse or doctor.               Start taking these medicines.        Dose/Directions    diazepam 10 MG tablet   Commonly known as:  VALIUM   Used for:  Malignant neoplasm of prostate (H)   Started by:  Zulay Lopez MD        Dose:  10 mg   Take 1 tablet (10 mg) by mouth every 6 hours as needed for anxiety or sleep Take 30-60 minutes before procedure.  Do not operate a vehicle after taking this medication.   Quantity:  2 tablet   Refills:  0         These medicines " have changed or have updated prescriptions.        Dose/Directions    predniSONE 20 MG tablet   Commonly known as:  DELTASONE   This may have changed:  how much to take   Used for:  Myalgia        Dose:  20 mg   Take 1 tablet (20 mg) by mouth daily   Quantity:  5 tablet   Refills:  0            Where to get your medicines      Some of these will need a paper prescription and others can be bought over the counter.  Ask your nurse if you have questions.     Bring a paper prescription for each of these medications     diazepam 10 MG tablet                Primary Care Provider Office Phone # Fax #    Abe Valencia [phone] [address] [place] [address]        Equal Access to Services     [facility]: Hadii velvet Wahl, waaxda parvin, qaybta kaalmada kristal, raffaele west . So [facility] [phone].    ATENCIÓN: Si habla español, tiene a sharp disposición servicios gratuitos de asistencia lingüística. [facility] [phone].    We comply with applicable federal civil rights laws and [place] laws. We do not discriminate on the basis of race, color, national origin, age, disability, sex, sexual orientation, or gender identity.            Thank you!     Thank you for choosing [facility] [place]  for your care. Our goal is always to provide you with excellent care. Hearing back from our patients is one way we can continue to improve our services. Please take a few minutes to complete the written survey that you may receive in the mail after your visit with us. Thank you!             Your Updated Medication List - Protect others around you: Learn how to safely use, store and throw away your medicines at www.disposemymeds.org.          This list is accurate as of 4/19/18 11:12 AM.  Always use your most recent med list.                   Brand Name Dispense Instructions for use Diagnosis    albuterol 108 (90 Base)  MCG/ACT Inhaler    PROAIR HFA/PROVENTIL HFA/VENTOLIN HFA     Inhale 2 puffs into the lungs every 4 hours as needed        alfuzosin 10 MG 24 hr tablet    UROXATRAL    30 tablet    TAKE 1 TABLET BY MOUTH DAILY    Nocturia       AMLODIPINE BESYLATE PO      Take by mouth daily        aspirin 81 MG chewable tablet      Take 81 mg by mouth daily.        azithromycin 250 MG tablet    ZITHROMAX    6 tablet    Two tablets first day, then one tablet daily for four days.    Persistent cough for 3 weeks or longer, Acute bronchitis due to Mycoplasma pneumoniae       cefpodoxime 100 MG tablet    VANTIN    6 tablet    Take 1 tablet (100 mg) by mouth 2 times daily    Elevated prostate specific antigen (PSA)       diazepam 10 MG tablet    VALIUM    2 tablet    Take 1 tablet (10 mg) by mouth every 6 hours as needed for anxiety or sleep Take 30-60 minutes before procedure.  Do not operate a vehicle after taking this medication.    Malignant neoplasm of prostate (H)       METHOTREXATE PO      Take 15 mg by mouth once a week        nitroGLYcerin 0.4 MG sublingual tablet    NITROSTAT    10 tablet    Place 1 tablet under the tongue every 5 minutes as needed for chest pain.    CAD (coronary artery disease)       predniSONE 20 MG tablet    DELTASONE    5 tablet    Take 1 tablet (20 mg) by mouth daily    Myalgia       sildenafil 100 MG tablet    VIAGRA    10 tablet    Take 1 tablet (100 mg) by mouth daily as needed for erectile dysfunction Do not take with NTG medication    Erectile dysfunction, unspecified erectile dysfunction type       vitamin D 34526 UNIT capsule    ERGOCALCIFEROL     Take 50,000 Units by mouth once a week

## 2018-04-19 NOTE — LETTER
"2018       RE: Cy Seymour  [address]   [facility] [address]     Dear Colleague,    Thank you for referring your patient, Cy Seymour, to the [facility] [place] at [facility]. Please see a copy of my visit note below.    UROLOGIC DIAGNOSES:     CURRENT INTERVENTIONS:       HISTORY:     Pt is a very pleasant 71 yo white male who presents to office today complaining of enlarged prostate and interested in MRI fusion biopsy. He states he has been followed by another urologist in the past and was diagnosed with an enlarged prostate. He reports his PSA has been monitored over past several years and has ranged 3-8. He also says he has undergone 1-2 MRIs of prostate in past with positive lesions but these results are unavailable at this time. He was considering a \"Urolift\" procedure but was told he should undergo prostate biospy prior to this intervention to rule out prostate cancer. He was schedule for biopsy at his previous urologist but opted to pursue MRI fusion biopsy with another provider.    His symptoms include nocturia, on average 5 times per night but as severe as 10 times per night. He also c/o of slow stream, starting and stopping, urgency with rare instances of leakage and incontinence. He does not wear pads/diapers. He has been prescribed medication for BPH in the past but has never taken anything.    Denies history of UTIs, kidney infections, or kidney stones.He reports a positive family hx of prostate cancer in father ( of CHF at 88 yo; tx unknown) and brother (living at 78 yo; tx with radiation).      Patient s/p prostate biopsy complicated by UTI with fever. Patient also with orchitis with reactive hydrocele. U/s today showed reactive hydrocele but no abscess.     Patient had PSA that ws 3.57.   We reviewed pathology and post procedure issues. Also discussed ASDI schedule as well as possible " TURP.   Discussed UDS to better characterize voiding symptoms.           PAST MEDICAL HISTORY:   Past Medical History:   Diagnosis Date     ADHD (attention deficit hyperactivity disorder)      Aneurysm artery, iliac (H)     father had AAA surgery     Anxiety      Atrial fibrillation (H) 5/9/2011     Bicuspid aortic valve      Coronary artery disease     ablation 2011     Depressive disorder      Diverticulitis      Gastro-oesophageal reflux disease      Hearing loss      History of spinal cord injury      Hypertension      Polymyalgia rheumatica (H)      Prostate infection      Restless legs      S/P nasal septoplasty     1998     Seasonal affective disorder (H)      Sleep apnea      Tachycardia     exercise initiated       PAST SURGICAL HISTORY:   Past Surgical History:   Procedure Laterality Date     BACK SURGERY      cervical fusion 2005     BLEPHAROPLASTY BILATERAL  2/27/2012    Procedure:BLEPHAROPLASTY BILATERAL; BILATERAL UPPER LID BLEPHAROPLASTY ; Surgeon:CÉSAR, JESSE RAMIREZ; Location:[facility]     CARDIAC SURGERY      stent 2003     CYSTOSCOPY       ORTHOPEDIC SURGERY      bilat hip replaced 2008,2009     PROSTATE SURGERY         FAMILY HISTORY:   Family History   Problem Relation Age of Onset     Arthritis Mother      Hypertension Mother      Hypertension Father      Heart Failure Father      HEART DISEASE Maternal Grandfather      HEART DISEASE Paternal Grandmother        SOCIAL HISTORY:   Social History   Substance Use Topics     Smoking status: Never Smoker     Smokeless tobacco: Never Used     Alcohol use Yes      Comment: 1-2 alcoholic beverages per month       Current Outpatient Prescriptions   Medication     AMLODIPINE BESYLATE PO     aspirin 81 MG chewable tablet     diazepam (VALIUM) 10 MG tablet     METHOTREXATE PO     predniSONE (DELTASONE) 20 MG tablet     vitamin D (ERGOCALCIFEROL) 63304 UNIT capsule     albuterol (PROAIR HFA/PROVENTIL HFA/VENTOLIN HFA) 108 (90 BASE) MCG/ACT Inhaler     alfuzosin  "(UROXATRAL) 10 MG 24 hr tablet     azithromycin (ZITHROMAX) 250 MG tablet     cefpodoxime (VANTIN) 100 MG tablet     nitroglycerin (NITROSTAT) 0.4 MG SL tablet     sildenafil (VIAGRA) 100 MG cap/tab     No current facility-administered medications for this visit.          PHYSICAL EXAM:    /80 (Patient Position: Sitting, Cuff Size: Adult Large)  Pulse 61  Ht 1.88 m (6' 2\")  Wt 117.9 kg (260 lb)  SpO2 97%  BMI 33.38 kg/m2    HEENT: Normocephalic and atraumatic   Cardiac: Not done  Back/Flank: Not done  CNS/PNS: Not done  Respiratory: Normal non-labored breathing  Abdomen: Soft nontender and nondistended  Peripheral Vascular: Not done  Mental Status: Not done    Penis: Not done  Scrotal Skin: Not done  Testicles: Not done  Epididymis: Not done  Digital Rectal Exam:     Cystoscopy: Not done    Imaging: None    Urinalysis: UA RESULTS:  Recent Labs   Lab Test  09/19/12   0959   COLOR  Yellow   APPEARANCE  Clear   URINEGLC  Negative   URINEBILI  Negative   URINEKETONE  Negative   SG  1.016   UBLD  Negative   URINEPH  5.0   PROTEIN  Negative   NITRITE  Negative   LEUKEST  Small*   RBCU  0   WBCU  2       PSA:     Post Void Residual:     Other labs: None today      IMPRESSION:  73 y/o M with lower urinary tract symptoms and grade group 1 prostate cancer     PLAN:  Refer for UDS at [facility]   PSA in four months   Follow up after UDS     Total Time: 15 minutes                                    Total in Consultation: greater than 50%       Again, thank you for allowing me to participate in the care of your patient.      Sincerely,    Zulay Lopez MD      "

## 2018-04-21 NOTE — PROGRESS NOTES
"UROLOGIC DIAGNOSES:     CURRENT INTERVENTIONS:       HISTORY:     Pt is a very pleasant 73 yo white male who presents to office today complaining of enlarged prostate and interested in MRI fusion biopsy. He states he has been followed by another urologist in the past and was diagnosed with an enlarged prostate. He reports his PSA has been monitored over past several years and has ranged 3-8. He also says he has undergone 1-2 MRIs of prostate in past with positive lesions but these results are unavailable at this time. He was considering a \"Urolift\" procedure but was told he should undergo prostate biospy prior to this intervention to rule out prostate cancer. He was schedule for biopsy at his previous urologist but opted to pursue MRI fusion biopsy with another provider.    His symptoms include nocturia, on average 5 times per night but as severe as 10 times per night. He also c/o of slow stream, starting and stopping, urgency with rare instances of leakage and incontinence. He does not wear pads/diapers. He has been prescribed medication for BPH in the past but has never taken anything.    Denies history of UTIs, kidney infections, or kidney stones.He reports a positive family hx of prostate cancer in father ( of CHF at 88 yo; tx unknown) and brother (living at 76 yo; tx with radiation).      Patient s/p prostate biopsy complicated by UTI with fever. Patient also with orchitis with reactive hydrocele. U/s today showed reactive hydrocele but no abscess.     Patient had PSA that ws 3.57.   We reviewed pathology and post procedure issues. Also discussed ASDI schedule as well as possible TURP.   Discussed UDS to better characterize voiding symptoms.           PAST MEDICAL HISTORY:   Past Medical History:   Diagnosis Date     ADHD (attention deficit hyperactivity disorder)      Aneurysm artery, iliac (H)     father had AAA surgery     Anxiety      Atrial fibrillation (H) 2011     Bicuspid aortic valve      " Coronary artery disease     ablation 2011     Depressive disorder      Diverticulitis      Gastro-oesophageal reflux disease      Hearing loss      History of spinal cord injury      Hypertension      Polymyalgia rheumatica (H)      Prostate infection      Restless legs      S/P nasal septoplasty     1998     Seasonal affective disorder (H)      Sleep apnea      Tachycardia     exercise initiated       PAST SURGICAL HISTORY:   Past Surgical History:   Procedure Laterality Date     BACK SURGERY      cervical fusion 2005     BLEPHAROPLASTY BILATERAL  2/27/2012    Procedure:BLEPHAROPLASTY BILATERAL; BILATERAL UPPER LID BLEPHAROPLASTY ; Surgeon:READER, JESSE RAMIREZ; Location:[facility]     CARDIAC SURGERY      stent 2003     CYSTOSCOPY       ORTHOPEDIC SURGERY      bilat hip replaced 2008,2009     PROSTATE SURGERY         FAMILY HISTORY:   Family History   Problem Relation Age of Onset     Arthritis Mother      Hypertension Mother      Hypertension Father      Heart Failure Father      HEART DISEASE Maternal Grandfather      HEART DISEASE Paternal Grandmother        SOCIAL HISTORY:   Social History   Substance Use Topics     Smoking status: Never Smoker     Smokeless tobacco: Never Used     Alcohol use Yes      Comment: 1-2 alcoholic beverages per month       Current Outpatient Prescriptions   Medication     AMLODIPINE BESYLATE PO     aspirin 81 MG chewable tablet     diazepam (VALIUM) 10 MG tablet     METHOTREXATE PO     predniSONE (DELTASONE) 20 MG tablet     vitamin D (ERGOCALCIFEROL) 99590 UNIT capsule     albuterol (PROAIR HFA/PROVENTIL HFA/VENTOLIN HFA) 108 (90 BASE) MCG/ACT Inhaler     alfuzosin (UROXATRAL) 10 MG 24 hr tablet     azithromycin (ZITHROMAX) 250 MG tablet     cefpodoxime (VANTIN) 100 MG tablet     nitroglycerin (NITROSTAT) 0.4 MG SL tablet     sildenafil (VIAGRA) 100 MG cap/tab     No current facility-administered medications for this visit.          PHYSICAL EXAM:    /80 (Patient Position: Sitting,  "Cuff Size: Adult Large)  Pulse 61  Ht 1.88 m (6' 2\")  Wt 117.9 kg (260 lb)  SpO2 97%  BMI 33.38 kg/m2    HEENT: Normocephalic and atraumatic   Cardiac: Not done  Back/Flank: Not done  CNS/PNS: Not done  Respiratory: Normal non-labored breathing  Abdomen: Soft nontender and nondistended  Peripheral Vascular: Not done  Mental Status: Not done    Penis: Not done  Scrotal Skin: Not done  Testicles: Not done  Epididymis: Not done  Digital Rectal Exam:     Cystoscopy: Not done    Imaging: None    Urinalysis: UA RESULTS:  Recent Labs   Lab Test  09/19/12   0959   COLOR  Yellow   APPEARANCE  Clear   URINEGLC  Negative   URINEBILI  Negative   URINEKETONE  Negative   SG  1.016   UBLD  Negative   URINEPH  5.0   PROTEIN  Negative   NITRITE  Negative   LEUKEST  Small*   RBCU  0   WBCU  2       PSA:     Post Void Residual:     Other labs: None today      IMPRESSION:  73 y/o M with lower urinary tract symptoms and grade group 1 prostate cancer     PLAN:  Refer for UDS at [facility]   PSA in four months   Follow up after UDS     Total Time: 15 minutes                                    Total in Consultation: greater than 50%     "

## 2018-04-27 ENCOUNTER — TRANSFERRED RECORDS (OUTPATIENT)
Dept: HEALTH INFORMATION MANAGEMENT | Facility: CLINIC | Age: 73
End: 2018-04-27

## 2018-05-03 ENCOUNTER — TRANSFERRED RECORDS (OUTPATIENT)
Dept: HEALTH INFORMATION MANAGEMENT | Facility: CLINIC | Age: 73
End: 2018-05-03

## 2018-05-20 ENCOUNTER — TRANSFERRED RECORDS (OUTPATIENT)
Dept: HEALTH INFORMATION MANAGEMENT | Facility: CLINIC | Age: 73
End: 2018-05-20

## 2018-05-31 ENCOUNTER — TRANSFERRED RECORDS (OUTPATIENT)
Dept: HEALTH INFORMATION MANAGEMENT | Facility: CLINIC | Age: 73
End: 2018-05-31

## 2018-08-08 ENCOUNTER — TRANSFERRED RECORDS (OUTPATIENT)
Dept: HEALTH INFORMATION MANAGEMENT | Facility: CLINIC | Age: 73
End: 2018-08-08

## 2018-08-13 ENCOUNTER — HOSPITAL ENCOUNTER (OUTPATIENT)
Dept: MRI IMAGING | Facility: CLINIC | Age: 73
Discharge: HOME | End: 2018-08-13
Attending: UROLOGY | Admitting: UROLOGY
Payer: MEDICARE

## 2018-08-13 DIAGNOSIS — C61 MALIGNANT NEOPLASM OF PROSTATE (H): ICD-10-CM

## 2018-08-13 PROCEDURE — 25000128 H RX IP 250 OP 636: Performed by: UROLOGY

## 2018-08-13 PROCEDURE — A9585 GADOBUTROL INJECTION: HCPCS | Performed by: UROLOGY

## 2018-08-13 PROCEDURE — 72197 MRI PELVIS W/O & W/DYE: CPT

## 2018-08-13 RX ORDER — GADOBUTROL 604.72 MG/ML
10 INJECTION INTRAVENOUS ONCE
Status: COMPLETED | OUTPATIENT
Start: 2018-08-13 | End: 2018-08-13

## 2018-08-13 RX ADMIN — GADOBUTROL 10 ML: 604.72 INJECTION INTRAVENOUS at 11:33

## 2018-08-16 DIAGNOSIS — C61 PROSTATE CANCER (H): Primary | ICD-10-CM

## 2018-08-21 ENCOUNTER — OFFICE VISIT (OUTPATIENT)
Dept: UROLOGY | Facility: CLINIC | Age: 73
End: 2018-08-21
Payer: COMMERCIAL

## 2018-08-21 VITALS
DIASTOLIC BLOOD PRESSURE: 80 MMHG | SYSTOLIC BLOOD PRESSURE: 139 MMHG | HEIGHT: 74 IN | WEIGHT: 260 LBS | BODY MASS INDEX: 33.37 KG/M2

## 2018-08-21 DIAGNOSIS — C61 PROSTATE CANCER (H): ICD-10-CM

## 2018-08-21 DIAGNOSIS — R35.1 NOCTURIA: ICD-10-CM

## 2018-08-21 LAB — PSA SERPL-MCNC: 9.3 NG/ML (ref 0–4)

## 2018-08-21 PROCEDURE — 84153 ASSAY OF PSA TOTAL: CPT | Performed by: UROLOGY

## 2018-08-21 PROCEDURE — 36415 COLL VENOUS BLD VENIPUNCTURE: CPT | Performed by: UROLOGY

## 2018-08-21 PROCEDURE — 99213 OFFICE O/P EST LOW 20 MIN: CPT | Performed by: UROLOGY

## 2018-08-21 RX ORDER — CARVEDILOL 6.25 MG/1
TABLET ORAL
Status: ON HOLD | COMMUNITY
Start: 2018-08-20 | End: 2020-04-17

## 2018-08-21 RX ORDER — ALFUZOSIN HYDROCHLORIDE 10 MG/1
1 TABLET, EXTENDED RELEASE ORAL DAILY
Qty: 90 TABLET | Refills: 3 | Status: SHIPPED | OUTPATIENT
Start: 2018-08-21 | End: 2019-04-02

## 2018-08-21 ASSESSMENT — PAIN SCALES - GENERAL: PAINLEVEL: NO PAIN (0)

## 2018-08-21 NOTE — PROGRESS NOTES
"UROLOGIC DIAGNOSES:     CURRENT INTERVENTIONS:       HISTORY:     Pt is a very pleasant 71 yo white male who presents to office today complaining of enlarged prostate and interested in MRI fusion biopsy. He states he has been followed by another urologist in the past and was diagnosed with an enlarged prostate. He reports his PSA has been monitored over past several years and has ranged 3-8. He also says he has undergone 1-2 MRIs of prostate in past with positive lesions but these results are unavailable at this time. He was considering a \"Urolift\" procedure but was told he should undergo prostate biospy prior to this intervention to rule out prostate cancer. He was scheduled for biopsy at his previous urologist but opted to pursue MRI fusion biopsy with another provider.    His symptoms include nocturia, on average 5 times per night but as severe as 10 times per night. He also c/o of slow stream, starting and stopping, urgency with rare instances of leakage and incontinence. He does not wear pads/diapers. He has been prescribed medication for BPH in the past but has never taken anything.    Denies history of UTIs, kidney infections, or kidney stones.He reports a positive family hx of prostate cancer in father ( of CHF at 88 yo; tx unknown) and brother (living at 76 yo; tx with radiation).      Patient s/p prostate biopsy complicated by UTI with fever. Patient also with orchitis with reactive hydrocele. U/s today showed reactive hydrocele but no abscess.     Patient states today that his urinary symptoms are tolerable. Did not have UDS, did not schedule for TURP. Not taking alfuzosin.     Had MRI prostate that did not show any suspicious lesions.   PSA today was 9.3      We discussed PSA result, MRI results, need for future biopsy, alpha blocker use, lower urinary tract symptoms.       PAST MEDICAL HISTORY:   Past Medical History:   Diagnosis Date     ADHD (attention deficit hyperactivity disorder)      " Aneurysm artery, iliac (H)     father had AAA surgery     Anxiety      Atrial fibrillation (H) 5/9/2011     Bicuspid aortic valve      Coronary artery disease     ablation 2011     Depressive disorder      Diverticulitis      Gastro-oesophageal reflux disease      Hearing loss      History of spinal cord injury      Hypertension      Polymyalgia rheumatica (H)      Prostate infection      Restless legs      S/P nasal septoplasty     1998     Seasonal affective disorder (H)      Sleep apnea      Tachycardia     exercise initiated       PAST SURGICAL HISTORY:   Past Surgical History:   Procedure Laterality Date     BACK SURGERY      cervical fusion 2005     BLEPHAROPLASTY BILATERAL  2/27/2012    Procedure:BLEPHAROPLASTY BILATERAL; BILATERAL UPPER LID BLEPHAROPLASTY ; Surgeon:JESSE LINDSEY; Location:[facility]     CARDIAC SURGERY      stent 2003     CYSTOSCOPY       ORTHOPEDIC SURGERY      bilat hip replaced 2008,2009     PROSTATE SURGERY         FAMILY HISTORY:   Family History   Problem Relation Age of Onset     Arthritis Mother      Hypertension Mother      Hypertension Father      Heart Failure Father      HEART DISEASE Maternal Grandfather      HEART DISEASE Paternal Grandmother        SOCIAL HISTORY:   Social History   Substance Use Topics     Smoking status: Never Smoker     Smokeless tobacco: Never Used     Alcohol use Yes      Comment: 1-2 alcoholic beverages per month       Current Outpatient Prescriptions   Medication     albuterol (PROAIR HFA/PROVENTIL HFA/VENTOLIN HFA) 108 (90 BASE) MCG/ACT Inhaler     alfuzosin (UROXATRAL) 10 MG 24 hr tablet     AMLODIPINE BESYLATE PO     aspirin 81 MG chewable tablet     cefpodoxime (VANTIN) 100 MG tablet     diazepam (VALIUM) 10 MG tablet     METHOTREXATE PO     nitroglycerin (NITROSTAT) 0.4 MG SL tablet     predniSONE (DELTASONE) 20 MG tablet     sildenafil (VIAGRA) 100 MG cap/tab     vitamin D (ERGOCALCIFEROL) 69277 UNIT capsule     No current facility-administered  "medications for this visit.          PHYSICAL EXAM:    Ht 1.88 m (6' 2\")    HEENT: Normocephalic and atraumatic   Cardiac: Not done  Back/Flank: Not done  CNS/PNS: Not done  Respiratory: Normal non-labored breathing  Abdomen: Soft nontender and nondistended  Peripheral Vascular: Not done  Mental Status: Not done    Penis: Not done  Scrotal Skin: Not done  Testicles: Not done  Epididymis: Not done  Digital Rectal Exam:     Cystoscopy: Not done    Imaging: None    Urinalysis: UA RESULTS:  Recent Labs   Lab Test  09/19/12   0959   COLOR  Yellow   APPEARANCE  Clear   URINEGLC  Negative   URINEBILI  Negative   URINEKETONE  Negative   SG  1.016   UBLD  Negative   URINEPH  5.0   PROTEIN  Negative   NITRITE  Negative   LEUKEST  Small*   RBCU  0   WBCU  2       PSA:     Post Void Residual:     Other labs: None today      IMPRESSION:  74 y/o M with lower urinary tract symptoms and grade group 1 prostate cancer     PLAN:  Repeat PSA in three months   Uroflow/PVR in three months   Will consider repeat biopsy pending PSA (patient with infection noted post biopsy)   Re-start alfuzosin   Follow up in three months     Total Time: 15 minutes                                    Total in Consultation: greater than 50%     "

## 2018-08-21 NOTE — MR AVS SNAPSHOT
After Visit Summary   8/21/2018    Cy Seymour    MRN: [id number]           Patient Information     Date Of Birth          1945        Visit Information        Provider Department      8/21/2018 10:00 AM Zulay Lopez MD [facility] [place]        Today's Diagnoses     Prostate cancer (H)        Nocturia           Follow-ups after your visit        Follow-up notes from your care team     Return in about 3 months (around 11/21/2018) for SD PSA  uroflow/PVR .      Your next 10 appointments already scheduled     Nov 20, 2018  1:15 PM CST   (Arrive by 1:00 PM)   Return Visit with Zulay Lopez MD   [facility] ([facility] [place])    [address]  [address]  [facility] [address] [phone]              Who to contact     If you have questions or need follow up information about today's clinic visit or your schedule please contact [facility] directly at [phone].  Normal or non-critical lab and imaging results will be communicated to you by MyChart, letter or phone within 4 business days after the clinic has received the results. If you do not hear from us within 7 days, please contact the clinic through [employer] or phone. If you have a critical or abnormal lab result, we will notify you by phone as soon as possible.  Submit refill requests through [employer] or call your pharmacy and they will forward the refill request to us. Please allow 3 business days for your refill to be completed.          Additional Information About Your Visit        MyChart Information     [employer] gives you secure access to your electronic health record. If you see a primary care provider, you can also send messages to your care team and make appointments. If you have questions, please call your primary care clinic.  If you do not have a primary care provider,  "please call [phone] and they will assist you.        Care EveryWhere ID     This is your Care EveryWhere ID. This could be used by other organizations to access your [place] medical records  [id number]        Your Vitals Were     Height BMI (Body Mass Index)                1.88 m (6' 2\") 33.38 kg/m2           Blood Pressure from Last 3 Encounters:   08/21/18 139/80   04/19/18 130/80   12/28/17 130/83    Weight from Last 3 Encounters:   08/21/18 117.9 kg (260 lb)   04/19/18 117.9 kg (260 lb)   12/28/17 120.7 kg (266 lb)              We Performed the Following     PSA Diag Urologic Phys          Today's Medication Changes          These changes are accurate as of 8/21/18 10:26 AM.  If you have any questions, ask your nurse or doctor.               These medicines have changed or have updated prescriptions.        Dose/Directions    alfuzosin 10 MG 24 hr tablet   Commonly known as:  UROXATRAL   This may have changed:  See the new instructions.   Used for:  Nocturia   Changed by:  Zulay Lopez MD        Dose:  1 tablet   Take 1 tablet (10 mg) by mouth daily   Quantity:  90 tablet   Refills:  3         Stop taking these medicines if you haven't already. Please contact your care team if you have questions.     azithromycin 250 MG tablet   Commonly known as:  ZITHROMAX   Stopped by:  Zulay Lopez MD                Where to get your medicines      These medications were sent to [facility] [address] [place] [address]     Phone:  [phone]     alfuzosin 10 MG 24 hr tablet                Primary Care Provider Office Phone # Fax #    Abe Valencia [phone]       [address] [place] [address]        Equal Access to Services     ADÁN REBOLLEDO AH: Jessica Wahl, waaxda luqadaha, qaybta kaalmada kristal, raffaele caraballo. So wac " [phone].    ATENCIÓN: Si aleah choi, tiene a sharp disposición servicios gratuitos de asistencia lingüística. Citlali solorzano [phone].    We comply with applicable federal civil rights laws and [place] laws. We do not discriminate on the basis of race, color, national origin, age, disability, sex, sexual orientation, or gender identity.            Thank you!     Thank you for choosing [facility] [place]  for your care. Our goal is always to provide you with excellent care. Hearing back from our patients is one way we can continue to improve our services. Please take a few minutes to complete the written survey that you may receive in the mail after your visit with us. Thank you!             Your Updated Medication List - Protect others around you: Learn how to safely use, store and throw away your medicines at www.disposemymeds.org.          This list is accurate as of 8/21/18 10:26 AM.  Always use your most recent med list.                   Brand Name Dispense Instructions for use Diagnosis    albuterol 108 (90 Base) MCG/ACT inhaler    PROAIR HFA/PROVENTIL HFA/VENTOLIN HFA     Inhale 2 puffs into the lungs every 4 hours as needed        alfuzosin 10 MG 24 hr tablet    UROXATRAL    90 tablet    Take 1 tablet (10 mg) by mouth daily    Nocturia       AMLODIPINE BESYLATE PO      Take by mouth daily        aspirin 81 MG chewable tablet      Take 81 mg by mouth daily.        carvedilol 6.25 MG tablet    COREG          cefpodoxime 100 MG tablet    VANTIN    6 tablet    Take 1 tablet (100 mg) by mouth 2 times daily    Elevated prostate specific antigen (PSA)       diazepam 10 MG tablet    VALIUM    2 tablet    Take 1 tablet (10 mg) by mouth every 6 hours as needed for anxiety or sleep Take 30-60 minutes before procedure.  Do not operate a vehicle after taking this medication.    Malignant neoplasm of prostate (H)       METHOTREXATE PO      Take 15 mg by mouth once a week         nitroGLYcerin 0.4 MG sublingual tablet    NITROSTAT    10 tablet    Place 1 tablet under the tongue every 5 minutes as needed for chest pain.    CAD (coronary artery disease)       predniSONE 20 MG tablet    DELTASONE    5 tablet    Take 1 tablet (20 mg) by mouth daily    Myalgia       sildenafil 100 MG tablet    VIAGRA    10 tablet    Take 1 tablet (100 mg) by mouth daily as needed for erectile dysfunction Do not take with NTG medication    Erectile dysfunction, unspecified erectile dysfunction type       vitamin D 32730 UNIT capsule    ERGOCALCIFEROL     Take 50,000 Units by mouth once a week

## 2018-10-15 ENCOUNTER — OFFICE VISIT (OUTPATIENT)
Dept: PSYCHOLOGY | Facility: CLINIC | Age: 73
End: 2018-10-15
Payer: COMMERCIAL

## 2018-10-15 DIAGNOSIS — F90.9 ATTENTION DEFICIT HYPERACTIVITY DISORDER (ADHD), UNSPECIFIED ADHD TYPE: ICD-10-CM

## 2018-10-15 DIAGNOSIS — F33.1 MODERATE EPISODE OF RECURRENT MAJOR DEPRESSIVE DISORDER (H): Primary | ICD-10-CM

## 2018-10-15 NOTE — MR AVS SNAPSHOT
After Visit Summary   10/15/2018    Cy Seymour    MRN: [id number]           Patient Information     Date Of Birth          1945        Visit Information        Provider Department      10/15/2018 10:00 AM Sara Navarro, PhD [facility]        Today's Diagnoses     Moderate episode of recurrent major depressive disorder (H)    -  1    Attention deficit hyperactivity disorder (ADHD), unspecified ADHD type           Follow-ups after your visit        Your next 10 appointments already scheduled     Nov 20, 2018  1:30 PM CST   (Arrive by 1:15 PM)   Return Visit with Zulay Lopez MD   [facility] [place] ([facility])    [address]  [address]  [facility] [address] [phone]              Future tests that were ordered for you today     Open Future Orders        Priority Expected Expires Ordered    PSA Diag Urologic Phys Routine  11/14/2019 11/14/2018    VOIDING PRESSURE STUDY, INTRA-ABDOMINAL (31573) Routine  11/14/2019 11/14/2018            Who to contact     Please call your clinic at [phone] to:    Ask questions about your health    Make or cancel appointments    Discuss your medicines    Learn about your test results    Speak to your doctor            Additional Information About Your Visit        [employer] Information     [employer] gives you secure access to your electronic health record. If you see a primary care provider, you can also send messages to your care team and make appointments. If you have questions, please call your primary care clinic.  If you do not have a primary care provider, please call [phone] and they will assist you.      [employer] is an electronic gateway that provides easy, online access to your medical records. With [employer], you can request a clinic appointment, read your test results, renew a prescription or communicate with your care team.     To access your  existing account, please contact your [facility] or call [phone] for assistance.        Care EveryWhere ID     This is your Care EveryWhere ID. This could be used by other organizations to access your [place] medical records  [id number]         Blood Pressure from Last 3 Encounters:   08/21/18 139/80   04/19/18 130/80   12/28/17 130/83    Weight from Last 3 Encounters:   08/21/18 117.9 kg (260 lb)   04/19/18 117.9 kg (260 lb)   12/28/17 120.7 kg (266 lb)              Today, you had the following     No orders found for display       Primary Care Provider Office Phone # Fax #    Abe Valencia [phone]       [address] [place] [address]        Equal Access to Services     [facility] : Hadii velvet churcho Soeduarda, waaxda luqadaha, qaybta kaalmada kristal, raffaele west . So [facility] [phone].    ATENCIÓN: Si habla español, tiene a sharp disposición servicios gratuitos de asistencia lingüística. Citlali al [phone].    We comply with applicable federal civil rights laws and [place] laws. We do not discriminate on the basis of race, color, national origin, age, disability, sex, sexual orientation, or gender identity.            Thank you!     Thank you for choosing [facility]  for your care. Our goal is always to provide you with excellent care. Hearing back from our patients is one way we can continue to improve our services. Please take a few minutes to complete the written survey that you may receive in the mail after your visit with us. Thank you!             Your Updated Medication List - Protect others around you: Learn how to safely use, store and throw away your medicines at www.disposemymeds.org.          This list is accurate as of 10/15/18 11:59 PM.  Always use your most recent med list.                   Brand Name Dispense Instructions for use Diagnosis    albuterol 108 (90 Base)  MCG/ACT inhaler    PROAIR HFA/PROVENTIL HFA/VENTOLIN HFA     Inhale 2 puffs into the lungs every 4 hours as needed        alfuzosin 10 MG 24 hr tablet    UROXATRAL    90 tablet    Take 1 tablet (10 mg) by mouth daily    Nocturia       AMLODIPINE BESYLATE PO      Take by mouth daily        aspirin 81 MG chewable tablet      Take 81 mg by mouth daily.        carvedilol 6.25 MG tablet    COREG          cefpodoxime 100 MG tablet    VANTIN    6 tablet    Take 1 tablet (100 mg) by mouth 2 times daily    Elevated prostate specific antigen (PSA)       diazepam 10 MG tablet    VALIUM    2 tablet    Take 1 tablet (10 mg) by mouth every 6 hours as needed for anxiety or sleep Take 30-60 minutes before procedure.  Do not operate a vehicle after taking this medication.    Malignant neoplasm of prostate (H)       METHOTREXATE PO      Take 15 mg by mouth once a week        nitroGLYcerin 0.4 MG sublingual tablet    NITROSTAT    10 tablet    Place 1 tablet under the tongue every 5 minutes as needed for chest pain.    CAD (coronary artery disease)       predniSONE 20 MG tablet    DELTASONE    5 tablet    Take 1 tablet (20 mg) by mouth daily    Myalgia       sildenafil 100 MG tablet    VIAGRA    10 tablet    Take 1 tablet (100 mg) by mouth daily as needed for erectile dysfunction Do not take with NTG medication    Erectile dysfunction, unspecified erectile dysfunction type       vitamin D2 28462 UNIT capsule    ERGOCALCIFEROL     Take 50,000 Units by mouth once a week

## 2018-10-25 ENCOUNTER — OFFICE VISIT (OUTPATIENT)
Dept: PSYCHOLOGY | Facility: CLINIC | Age: 73
End: 2018-10-25
Payer: COMMERCIAL

## 2018-10-25 DIAGNOSIS — I10 ESSENTIAL HYPERTENSION: ICD-10-CM

## 2018-10-25 DIAGNOSIS — F33.1 MODERATE EPISODE OF RECURRENT MAJOR DEPRESSIVE DISORDER (H): Primary | ICD-10-CM

## 2018-10-25 DIAGNOSIS — F90.9 ATTENTION DEFICIT HYPERACTIVITY DISORDER (ADHD), UNSPECIFIED ADHD TYPE: ICD-10-CM

## 2018-10-25 NOTE — MR AVS SNAPSHOT
After Visit Summary   10/25/2018    Cy Seymour    MRN: [id number]           Patient Information     Date Of Birth          1945        Visit Information        Provider Department      10/25/2018 10:00 AM Sara Navarro, PhD [facility]        Today's Diagnoses     Moderate episode of recurrent major depressive disorder (H)    -  1    Attention deficit hyperactivity disorder (ADHD), unspecified ADHD type        Essential hypertension           Follow-ups after your visit        Your next 10 appointments already scheduled     Jan 17, 2019 11:00 AM CST   Return Visit with Zulay Lopez MD   [facility] [place] ([facility] [place])    [address]  [address]  [facility] [address] [phone]              Who to contact     Please call your clinic at [phone] to:    Ask questions about your health    Make or cancel appointments    Discuss your medicines    Learn about your test results    Speak to your doctor            Additional Information About Your Visit        [employer] Information     [employer] gives you secure access to your electronic health record. If you see a primary care provider, you can also send messages to your care team and make appointments. If you have questions, please call your primary care clinic.  If you do not have a primary care provider, please call [phone] and they will assist you.      [employer] is an electronic gateway that provides easy, online access to your medical records. With [employer], you can request a clinic appointment, read your test results, renew a prescription or communicate with your care team.     To access your existing account, please contact your [facility] or call [phone] for assistance.        Care EveryWhere ID     This is your Care EveryWhere ID. This could be used by other organizations to access your [facility]  records  [id number]         Blood Pressure from Last 3 Encounters:   11/20/18 128/80   08/21/18 139/80   04/19/18 130/80    Weight from Last 3 Encounters:   11/20/18 120.2 kg (265 lb)   08/21/18 117.9 kg (260 lb)   04/19/18 117.9 kg (260 lb)              Today, you had the following     No orders found for display       Primary Care Provider Office Phone # Fax #    Abemoses Valencia [phone]       [facility] [address] [facility] [address]        Equal Access to Services     [facility]: Hadii aad goldy hadlouisao Soeduarda, waaxda luqadaha, qaybta kaalmada adeevaristoyada, raffaele west . So [facility] [phone].    ATENCIÓN: Si habla español, tiene a sharp disposición servicios gratuitos de asistencia lingüística. [facility] [phone].    We comply with applicable federal civil rights laws and [place] laws. We do not discriminate on the basis of race, color, national origin, age, disability, sex, sexual orientation, or gender identity.            Thank you!     Thank you for choosing [facility]  for your care. Our goal is always to provide you with excellent care. Hearing back from our patients is one way we can continue to improve our services. Please take a few minutes to complete the written survey that you may receive in the mail after your visit with us. Thank you!             Your Updated Medication List - Protect others around you: Learn how to safely use, store and throw away your medicines at www.disposemymeds.org.          This list is accurate as of 10/25/18 11:59 PM.  Always use your most recent med list.                   Brand Name Dispense Instructions for use Diagnosis    albuterol 108 (90 Base) MCG/ACT inhaler    PROAIR HFA/PROVENTIL HFA/VENTOLIN HFA     Inhale 2 puffs into the lungs every 4 hours as needed        alfuzosin 10 MG 24 hr tablet    UROXATRAL    90 tablet    Take 1 tablet (10 mg) by mouth daily    Nocturia        AMLODIPINE BESYLATE PO      Take by mouth daily        aspirin 81 MG chewable tablet    ASA     Take 81 mg by mouth daily.        carvedilol 6.25 MG tablet    COREG          cefpodoxime 100 MG tablet    VANTIN    6 tablet    Take 1 tablet (100 mg) by mouth 2 times daily    Elevated prostate specific antigen (PSA)       diazepam 10 MG tablet    VALIUM    2 tablet    Take 1 tablet (10 mg) by mouth every 6 hours as needed for anxiety or sleep Take 30-60 minutes before procedure.  Do not operate a vehicle after taking this medication.    Malignant neoplasm of prostate (H)       METHOTREXATE PO      Take 15 mg by mouth once a week        nitroGLYcerin 0.4 MG sublingual tablet    NITROSTAT    10 tablet    Place 1 tablet under the tongue every 5 minutes as needed for chest pain.    CAD (coronary artery disease)       predniSONE 20 MG tablet    DELTASONE    5 tablet    Take 1 tablet (20 mg) by mouth daily    Myalgia       sildenafil 100 MG tablet    VIAGRA    10 tablet    Take 1 tablet (100 mg) by mouth daily as needed for erectile dysfunction Do not take with NTG medication    Erectile dysfunction, unspecified erectile dysfunction type       vitamin D2 01453 UNIT capsule    ERGOCALCIFEROL     Take 50,000 Units by mouth once a week

## 2018-11-02 ENCOUNTER — OFFICE VISIT (OUTPATIENT)
Dept: PSYCHOLOGY | Facility: CLINIC | Age: 73
End: 2018-11-02
Payer: COMMERCIAL

## 2018-11-02 DIAGNOSIS — F33.1 MODERATE EPISODE OF RECURRENT MAJOR DEPRESSIVE DISORDER (H): Primary | ICD-10-CM

## 2018-11-02 DIAGNOSIS — F90.9 ATTENTION DEFICIT HYPERACTIVITY DISORDER (ADHD), UNSPECIFIED ADHD TYPE: ICD-10-CM

## 2018-11-02 NOTE — MR AVS SNAPSHOT
After Visit Summary   11/2/2018    Cy Seymour    MRN: [id number]           Patient Information     Date Of Birth          1945        Visit Information        Provider Department      11/2/2018 10:00 AM Sara Navarro, PhD [facility]        Today's Diagnoses     Moderate episode of recurrent major depressive disorder (H)    -  1    Attention deficit hyperactivity disorder (ADHD), unspecified ADHD type           Follow-ups after your visit        Your next 10 appointments already scheduled     Jan 17, 2019 11:00 AM CST   Return Visit with Zulay Lopez MD   [facility] [place] ([facility] [place])    [address]  [address]  [facility] [address] [phone]              Who to contact     Please call your clinic at [phone] to:    Ask questions about your health    Make or cancel appointments    Discuss your medicines    Learn about your test results    Speak to your doctor            Additional Information About Your Visit        [employer] Information     [employer] gives you secure access to your electronic health record. If you see a primary care provider, you can also send messages to your care team and make appointments. If you have questions, please call your primary care clinic.  If you do not have a primary care provider, please call [phone] and they will assist you.      [employer] is an electronic gateway that provides easy, online access to your medical records. With [employer] you can request a clinic appointment, read your test results, renew a prescription or communicate with your care team.     To access your existing account, please contact your [facility] or call [phone] for assistance.        Care EveryWhere ID     This is your Care EveryWhere ID. This could be used by other organizations to access your [place] medical records  [id number]          Blood Pressure from Last 3 Encounters:   11/20/18 128/80   08/21/18 139/80   04/19/18 130/80    Weight from Last 3 Encounters:   11/20/18 120.2 kg (265 lb)   08/21/18 117.9 kg (260 lb)   04/19/18 117.9 kg (260 lb)              Today, you had the following     No orders found for display       Primary Care Provider Office Phone # Fax #    Abe Valencia [phone]       [facility] [place] [facility] [address] [facility] [address]        Equal Access to Services     [facility]: Hadii aad ku hadasho Soomaali, waaxda luqadaha, qaybta kaalmada adeevaristoyada, raffaele west . So [facility] [phone].    ATENCIÓN: Si habla español, tiene a sharp disposición servicios gratuitos de asistencia lingüística. [facility] [phone].    We comply with applicable federal civil rights laws and [place] laws. We do not discriminate on the basis of race, color, national origin, age, disability, sex, sexual orientation, or gender identity.            Thank you!     Thank you for choosing [facility]  for your care. Our goal is always to provide you with excellent care. Hearing back from our patients is one way we can continue to improve our services. Please take a few minutes to complete the written survey that you may receive in the mail after your visit with us. Thank you!             Your Updated Medication List - Protect others around you: Learn how to safely use, store and throw away your medicines at www.disposemymeds.org.          This list is accurate as of 11/2/18 11:59 PM.  Always use your most recent med list.                   Brand Name Dispense Instructions for use Diagnosis    albuterol 108 (90 Base) MCG/ACT inhaler    PROAIR HFA/PROVENTIL HFA/VENTOLIN HFA     Inhale 2 puffs into the lungs every 4 hours as needed        alfuzosin 10 MG 24 hr tablet    UROXATRAL    90 tablet    Take 1 tablet (10 mg) by mouth daily    Nocturia       AMLODIPINE BESYLATE PO      Take  by mouth daily        aspirin 81 MG chewable tablet      Take 81 mg by mouth daily.        carvedilol 6.25 MG tablet    COREG          cefpodoxime 100 MG tablet    VANTIN    6 tablet    Take 1 tablet (100 mg) by mouth 2 times daily    Elevated prostate specific antigen (PSA)       diazepam 10 MG tablet    VALIUM    2 tablet    Take 1 tablet (10 mg) by mouth every 6 hours as needed for anxiety or sleep Take 30-60 minutes before procedure.  Do not operate a vehicle after taking this medication.    Malignant neoplasm of prostate (H)       METHOTREXATE PO      Take 15 mg by mouth once a week        nitroGLYcerin 0.4 MG sublingual tablet    NITROSTAT    10 tablet    Place 1 tablet under the tongue every 5 minutes as needed for chest pain.    CAD (coronary artery disease)       predniSONE 20 MG tablet    DELTASONE    5 tablet    Take 1 tablet (20 mg) by mouth daily    Myalgia       sildenafil 100 MG tablet    VIAGRA    10 tablet    Take 1 tablet (100 mg) by mouth daily as needed for erectile dysfunction Do not take with NTG medication    Erectile dysfunction, unspecified erectile dysfunction type       vitamin D2 60564 UNIT capsule    ERGOCALCIFEROL     Take 50,000 Units by mouth once a week

## 2018-11-14 DIAGNOSIS — R35.1 NOCTURIA: Primary | ICD-10-CM

## 2018-11-14 DIAGNOSIS — C61 PROSTATE CANCER (H): ICD-10-CM

## 2018-11-15 NOTE — PROGRESS NOTES
"    Health Psychology                  [facility]    Department of Medicine  Lou Fletcher, Ph.D., L.P. [phone]                          Clinic [facility] Center  [facility] Sara Navarro, Ph.D.,  L.P. [phone]                 Health Psychology - 3rd floor  [place] Mail Code 741   Gregorio Medina, Ph.D., BRENTON.ELYP., L.P. [phone]     [address] [place] [address] Siria Wyatt, Ph.D., L.P. [phone]  [place] [address]       Patient: Cy Seymour  Patient's YOB: 1945  MRN: [id number]  Psychologist: Sara Navarro, PhD,   Initial Evaluation Date: 11/21/2016      Health Psychology    History of Presenting Complaint at intake and recent visit:   Mr. eSymour is a 70 yo man with an extensive history of severe major depressive episodes superimposed on chronic dysthymic mood. He reports that his first major depressive episode occurred when he was in his early 30's.  He was diagnosed with ADHD a few years ago and was prescribed Adderall.  He had stopped taking, but reports that he recently returned to it and has found that he tolerates better a much lower dose than he had been previously prescribed.  Around that same time he was diagnosed with sleep apnea for which he received treatment.  He also reported a positive response to Viibryd which he took for depression (most other antidepressant treatments were not effective or had side-effects. He stopped taking this when he started dating his current wife.     Mr. Seymour reported that he has always had a \"depressive personality.\" Mr. Seymour's most recent major depressive episode occurred following the death of his first wife to lung cancer.  In the past he has had persistent suicidal ideation including a specific plan (e.g., overdosing on narcotic medication, shooting self).  He was hospitalized at one point and received ECT.      Mr. Seymour reported a seasonal " "variation in his depression, getting worse in the winter months.  He reports that he has not tried light therapy before to treat his depression.     Mr. Seymour reports that he is currently seeking treatment in order to work on his relationship with his current (2nd) wife.  He reports that he is her 4th .  He worries that she may have a personality disorder and feels it is negatively impacting their relationship.     Mr. Seymour describes himself with these terms: a loner, leadership qualities, opinionated (though often keeps opinions to himself), a caretaker, conflict-avoidant,    He feels he is living on \"bonus time\" because of health problems (e.g., recent heart ablation and stents).      Confidential Visit Summary    Service: Individual Psychotherapy     Start time:  10:05  Stop time:  11:00  Treatment modality:   Cognitive Behavioral Psychotherapy   Insight Oriented Psychotherapy   Patient s progress on goals:   Met patient in the lobby and escorted to the consultation room for visit. Patient reported that he had not returned after initial appointment because he felt angry and frustrated that I hand't been able to see him right away during the midday hours he was available.  Discussed what he called his narcissim that took time to get over the hurt, but now sees things differently.  Presented in a more open and engaged way than at our last visit.  Would like to work on his depression and improving his relationship with his wife.    Developing tools and insights.     Patient's response to treatment:  Engaged, reflective, and motivated   Participates fully.   Appearing to benefit from treatment.    Plan: Ongoing individual psychotherapy     Current mental health status:   General appearance:  Appropriate, well kept   Mood: down   Affect: Mood Congruent   Cognition: Appropriate for age   Orientation: Times three   Insight: fair   Memory: Appropriate long term / Short term   Psychosis: Denies   Suicidal " / Homicidal Thoughts: Denies    Diagnosis:    Axis I: MDD, mild to moderate   Axis II: deferred   Axis III: Sleep apnea, also see above and EMR   Axis IV: limited social support        Goals and Interventions:   Problem Goals   Mood Disorder  Depression  Anxiety   [] Decrease symptoms  [] Improve well being  [] Improve coping  [] Change behavior/cognitions  [] Improve psychosocial support  [] Improve decision making  [x] Improve self-care   [] Improve sleep habits/sleep quality  [] Monitor psychological status      Relationship      [x] Improve coping  [] Change behavior/cognitions  [] Improve decision making  [] Monitor psychological status  [] Monitor drug effects  [] Improve communication   [] Enhance relationship  [x] Improve psychosocial support  [] Other:               Interventions of Treatment   [x] Fostered healthy therapeutic alliance  [x] Addressed unhealthy cognitions  [] Addressed unhealthy behaviors  [] Relaxation training  [] Psycho-education  [] Bibliotherapy  [x] Provided support  [x] Explored/confronted resistance  [] Developed insights into cognitions/behaviors  [] Identified/Promoted adaptive coping strategies  [] Education/training in sleep   [] Education/training in mindfulness tools       Sara Navarro, PhD  Licensed Psychologist  Pager: [phone]

## 2018-11-20 ENCOUNTER — OFFICE VISIT (OUTPATIENT)
Dept: UROLOGY | Facility: CLINIC | Age: 73
End: 2018-11-20
Payer: COMMERCIAL

## 2018-11-20 VITALS
HEIGHT: 74 IN | DIASTOLIC BLOOD PRESSURE: 80 MMHG | SYSTOLIC BLOOD PRESSURE: 128 MMHG | WEIGHT: 265 LBS | BODY MASS INDEX: 34.01 KG/M2

## 2018-11-20 DIAGNOSIS — C61 PROSTATE CANCER (H): ICD-10-CM

## 2018-11-20 LAB — PSA SERPL-MCNC: 10.8 NG/ML (ref 0–4)

## 2018-11-20 PROCEDURE — 99213 OFFICE O/P EST LOW 20 MIN: CPT | Performed by: UROLOGY

## 2018-11-20 PROCEDURE — 84153 ASSAY OF PSA TOTAL: CPT | Performed by: UROLOGY

## 2018-11-20 PROCEDURE — 36415 COLL VENOUS BLD VENIPUNCTURE: CPT | Performed by: UROLOGY

## 2018-11-20 ASSESSMENT — PAIN SCALES - GENERAL: PAINLEVEL: NO PAIN (0)

## 2018-11-20 NOTE — MR AVS SNAPSHOT
After Visit Summary   11/20/2018    Cy Seymour    MRN: [id number]           Patient Information     Date Of Birth          1945        Visit Information        Provider Department      11/20/2018 1:30 PM Zulay Lopez MD [facility] [place]        Today's Diagnoses     Prostate cancer (H)           Follow-ups after your visit        Follow-up notes from your care team     Return in about 8 weeks (around 1/15/2019).      Your next 10 appointments already scheduled     Herman 15, 2019  1:15 PM CST   (Arrive by 1:00 PM)   Return Visit with Zulay Lopez MD   [facility] [place] ([facility])    [address]  [address]  [facility] [address] [phone]              Who to contact     If you have questions or need follow up information about today's clinic visit or your schedule please contact [facility] directly at [phone].  Normal or non-critical lab and imaging results will be communicated to you by [employer], letter or phone within 4 business days after the clinic has received the results. If you do not hear from us within 7 days, please contact the clinic through [employer] or phone. If you have a critical or abnormal lab result, we will notify you by phone as soon as possible.  Submit refill requests through REHAPP or call your pharmacy and they will forward the refill request to us. Please allow 3 business days for your refill to be completed.          Additional Information About Your Visit        [employer] Information     REHAPP gives you secure access to your electronic health record. If you see a primary care provider, you can also send messages to your care team and make appointments. If you have questions, please call your primary care clinic.  If you do not have a primary care provider, please call [phone] and they will assist  "you.        Care EveryWhere ID     This is your Care EveryWhere ID. This could be used by other organizations to access your [place] medical records  [id number]        Your Vitals Were     Height BMI (Body Mass Index)                1.88 m (6' 2\") 34.02 kg/m2           Blood Pressure from Last 3 Encounters:   11/20/18 128/80   08/21/18 139/80   04/19/18 130/80    Weight from Last 3 Encounters:   11/20/18 120.2 kg (265 lb)   08/21/18 117.9 kg (260 lb)   04/19/18 117.9 kg (260 lb)              We Performed the Following     PSA Diag Urologic Phys        Primary Care Provider Office Phone # Fax #    Lias Mcleod [phone]       [facility] [address] [facility] [address]        Equal Access to Services     [facility]: Hadii aad ku hadasho Soomaali, waaxda luqadaha, qaybta kaalmada adeegyada, waxay idiin hayaan adeevaristo west . So [facility] [phone].    ATENCIÓN: Si habla español, tiene a sharp disposición servicios gratuitos de asistencia lingüística. Citlali al [phone].    We comply with applicable federal civil rights laws and [place] laws. We do not discriminate on the basis of race, color, national origin, age, disability, sex, sexual orientation, or gender identity.            Thank you!     Thank you for choosing [facility] [place]  for your care. Our goal is always to provide you with excellent care. Hearing back from our patients is one way we can continue to improve our services. Please take a few minutes to complete the written survey that you may receive in the mail after your visit with us. Thank you!             Your Updated Medication List - Protect others around you: Learn how to safely use, store and throw away your medicines at www.disposemymeds.org.          This list is accurate as of 11/20/18  2:03 PM.  Always use your most recent med list.                   Brand Name Dispense Instructions for use Diagnosis    " ADDERALL PO      Take 10 mg by mouth        albuterol 108 (90 Base) MCG/ACT inhaler    PROAIR HFA/PROVENTIL HFA/VENTOLIN HFA     Inhale 2 puffs into the lungs every 4 hours as needed        alfuzosin 10 MG 24 hr tablet    UROXATRAL    90 tablet    Take 1 tablet (10 mg) by mouth daily    Nocturia       AMLODIPINE BESYLATE PO      Take by mouth daily        aspirin 81 MG chewable tablet      Take 81 mg by mouth daily.        carvedilol 6.25 MG tablet    COREG          cefpodoxime 100 MG tablet    VANTIN    6 tablet    Take 1 tablet (100 mg) by mouth 2 times daily    Elevated prostate specific antigen (PSA)       diazepam 10 MG tablet    VALIUM    2 tablet    Take 1 tablet (10 mg) by mouth every 6 hours as needed for anxiety or sleep Take 30-60 minutes before procedure.  Do not operate a vehicle after taking this medication.    Malignant neoplasm of prostate (H)       METHOTREXATE PO      Take 15 mg by mouth once a week        nitroGLYcerin 0.4 MG sublingual tablet    NITROSTAT    10 tablet    Place 1 tablet under the tongue every 5 minutes as needed for chest pain.    CAD (coronary artery disease)       predniSONE 20 MG tablet    DELTASONE    5 tablet    Take 1 tablet (20 mg) by mouth daily    Myalgia       sildenafil 100 MG tablet    VIAGRA    10 tablet    Take 1 tablet (100 mg) by mouth daily as needed for erectile dysfunction Do not take with NTG medication    Erectile dysfunction, unspecified erectile dysfunction type       vitamin D2 45035 UNIT capsule    ERGOCALCIFEROL     Take 50,000 Units by mouth once a week

## 2018-11-20 NOTE — PROGRESS NOTES
"UROLOGIC DIAGNOSES:     CURRENT INTERVENTIONS:       HISTORY:     Pt is a very pleasant 71 yo white male who presents to office today complaining of enlarged prostate and interested in MRI fusion biopsy. He states he has been followed by another urologist in the past and was diagnosed with an enlarged prostate. He reports his PSA has been monitored over past several years and has ranged 3-8. He also says he has undergone 1-2 MRIs of prostate in past with positive lesions but these results are unavailable at this time. He was considering a \"Urolift\" procedure but was told he should undergo prostate biospy prior to this intervention to rule out prostate cancer. He was scheduled for biopsy at his previous urologist but opted to pursue MRI fusion biopsy with another provider.    His symptoms include nocturia, on average 5 times per night but as severe as 10 times per night. He also c/o of slow stream, starting and stopping, urgency with rare instances of leakage and incontinence. He does not wear pads/diapers. He has been prescribed medication for BPH in the past but has never taken anything.    Denies history of UTIs, kidney infections, or kidney stones.He reports a positive family hx of prostate cancer in father ( of CHF at 88 yo; tx unknown) and brother (living at 76 yo; tx with radiation).      Patient s/p prostate biopsy complicated by UTI with fever. Patient also with orchitis with reactive hydrocele. U/s today showed reactive hydrocele but no abscess.     Patient states today that his urinary symptoms are tolerable. Did not have UDS, did not schedule for TURP. Not taking alfuzosin.     Had MRI prostate that did not show any suspicious lesions.   PSA today was 10.8, previous was 9.3.       We discussed PSA result, MRI results, need for future biopsy, alpha blocker use, lower urinary tract symptoms.       PAST MEDICAL HISTORY:   Past Medical History:   Diagnosis Date     ADHD (attention deficit " hyperactivity disorder)      Aneurysm artery, iliac (H)     father had AAA surgery     Anxiety      Atrial fibrillation (H) 5/9/2011     Bicuspid aortic valve      Coronary artery disease     ablation 2011     Depressive disorder      Diverticulitis      Gastro-oesophageal reflux disease      Hearing loss      History of spinal cord injury      Hypertension      Polymyalgia rheumatica (H)      Prostate infection      Restless legs      S/P nasal septoplasty     1998     Seasonal affective disorder (H)      Sleep apnea      Tachycardia     exercise initiated       PAST SURGICAL HISTORY:   Past Surgical History:   Procedure Laterality Date     BACK SURGERY      cervical fusion 2005     BLEPHAROPLASTY BILATERAL  2/27/2012    Procedure:BLEPHAROPLASTY BILATERAL; BILATERAL UPPER LID BLEPHAROPLASTY ; Surgeon:CÉSAR, JESSE RAMIREZ; Location[facility]     CARDIAC SURGERY      stent 2003     CYSTOSCOPY       ORTHOPEDIC SURGERY      bilat hip replaced 2008,2009     PROSTATE SURGERY         FAMILY HISTORY:   Family History   Problem Relation Age of Onset     Arthritis Mother      Hypertension Mother      Hypertension Father      Heart Failure Father      HEART DISEASE Maternal Grandfather      HEART DISEASE Paternal Grandmother        SOCIAL HISTORY:   Social History   Substance Use Topics     Smoking status: Never Smoker     Smokeless tobacco: Never Used     Alcohol use Yes      Comment: 1-2 alcoholic beverages per month       Current Outpatient Prescriptions   Medication     Amphetamine-Dextroamphetamine (ADDERALL PO)     aspirin 81 MG chewable tablet     carvedilol (COREG) 6.25 MG tablet     sildenafil (VIAGRA) 100 MG cap/tab     vitamin D (ERGOCALCIFEROL) 01991 UNIT capsule     albuterol (PROAIR HFA/PROVENTIL HFA/VENTOLIN HFA) 108 (90 BASE) MCG/ACT Inhaler     alfuzosin (UROXATRAL) 10 MG 24 hr tablet     AMLODIPINE BESYLATE PO     cefpodoxime (VANTIN) 100 MG tablet     diazepam (VALIUM) 10 MG tablet     METHOTREXATE PO      "nitroglycerin (NITROSTAT) 0.4 MG SL tablet     predniSONE (DELTASONE) 20 MG tablet     No current facility-administered medications for this visit.          PHYSICAL EXAM:    /80 (BP Location: Left arm, Patient Position: Sitting, Cuff Size: Adult Regular)  Ht 1.88 m (6' 2\")  Wt 120.2 kg (265 lb)  BMI 34.02 kg/m2    HEENT: Normocephalic and atraumatic   Cardiac: Not done  Back/Flank: Not done  CNS/PNS: Not done  Respiratory: Normal non-labored breathing  Abdomen: Soft nontender and nondistended  Peripheral Vascular: Not done  Mental Status: Not done    Penis: Not done  Scrotal Skin: Not done  Testicles: Not done  Epididymis: Not done  Digital Rectal Exam:     Cystoscopy: Not done    Imaging: None    Urinalysis: UA RESULTS:  Recent Labs   Lab Test  09/19/12   0959   COLOR  Yellow   APPEARANCE  Clear   URINEGLC  Negative   URINEBILI  Negative   URINEKETONE  Negative   SG  1.016   UBLD  Negative   URINEPH  5.0   PROTEIN  Negative   NITRITE  Negative   LEUKEST  Small*   RBCU  0   WBCU  2       PSA:     Post Void Residual:     Other labs: None today      IMPRESSION:  72 y/o M with lower urinary tract symptoms and grade group 1 prostate cancer     PLAN:  Repeat PSA in 01/2019 prior to patient's departure for the winter   Will consider biopsy vs repeat PSA upon patient's return     Total Time: 15 minutes                                    Total in Consultation: greater than 50%     "

## 2018-11-20 NOTE — LETTER
"2018       RE: Cy Seymour  [address]   [place] [address]     Dear Colleague,    Thank you for referring your patient, Cy Seymour, to the [facility] [place] at [facility]. Please see a copy of my visit note below.    UROLOGIC DIAGNOSES:     CURRENT INTERVENTIONS:       HISTORY:     Pt is a very pleasant 73 yo white male who presents to office today complaining of enlarged prostate and interested in MRI fusion biopsy. He states he has been followed by another urologist in the past and was diagnosed with an enlarged prostate. He reports his PSA has been monitored over past several years and has ranged 3-8. He also says he has undergone 1-2 MRIs of prostate in past with positive lesions but these results are unavailable at this time. He was considering a \"Urolift\" procedure but was told he should undergo prostate biospy prior to this intervention to rule out prostate cancer. He was scheduled for biopsy at his previous urologist but opted to pursue MRI fusion biopsy with another provider.    His symptoms include nocturia, on average 5 times per night but as severe as 10 times per night. He also c/o of slow stream, starting and stopping, urgency with rare instances of leakage and incontinence. He does not wear pads/diapers. He has been prescribed medication for BPH in the past but has never taken anything.    Denies history of UTIs, kidney infections, or kidney stones.He reports a positive family hx of prostate cancer in father ( of CHF at 90 yo; tx unknown) and brother (living at 78 yo; tx with radiation).      Patient s/p prostate biopsy complicated by UTI with fever. Patient also with orchitis with reactive hydrocele. U/s today showed reactive hydrocele but no abscess.     Patient states today that his urinary symptoms are tolerable. Did not have UDS, did not schedule for TURP. Not taking alfuzosin. "     Had MRI prostate that did not show any suspicious lesions.   PSA today was 10.8, previous was 9.3.       We discussed PSA result, MRI results, need for future biopsy, alpha blocker use, lower urinary tract symptoms.       PAST MEDICAL HISTORY:   Past Medical History:   Diagnosis Date     ADHD (attention deficit hyperactivity disorder)      Aneurysm artery, iliac (H)     father had AAA surgery     Anxiety      Atrial fibrillation (H) 5/9/2011     Bicuspid aortic valve      Coronary artery disease     ablation 2011     Depressive disorder      Diverticulitis      Gastro-oesophageal reflux disease      Hearing loss      History of spinal cord injury      Hypertension      Polymyalgia rheumatica (H)      Prostate infection      Restless legs      S/P nasal septoplasty     1998     Seasonal affective disorder (H)      Sleep apnea      Tachycardia     exercise initiated       PAST SURGICAL HISTORY:   Past Surgical History:   Procedure Laterality Date     BACK SURGERY      cervical fusion 2005     BLEPHAROPLASTY BILATERAL  2/27/2012    Procedure:BLEPHAROPLASTY BILATERAL; BILATERAL UPPER LID BLEPHAROPLASTY ; Surgeon:JESSE LINDSEY; [place]     CARDIAC SURGERY      stent 2003     CYSTOSCOPY       ORTHOPEDIC SURGERY      bilat hip replaced 2008,2009     PROSTATE SURGERY         FAMILY HISTORY:   Family History   Problem Relation Age of Onset     Arthritis Mother      Hypertension Mother      Hypertension Father      Heart Failure Father      HEART DISEASE Maternal Grandfather      HEART DISEASE Paternal Grandmother        SOCIAL HISTORY:   Social History   Substance Use Topics     Smoking status: Never Smoker     Smokeless tobacco: Never Used     Alcohol use Yes      Comment: 1-2 alcoholic beverages per month       Current Outpatient Prescriptions   Medication     Amphetamine-Dextroamphetamine (ADDERALL PO)     aspirin 81 MG chewable tablet     carvedilol (COREG) 6.25 MG tablet     sildenafil (VIAGRA) 100 MG  "cap/tab     vitamin D (ERGOCALCIFEROL) 31460 UNIT capsule     albuterol (PROAIR HFA/PROVENTIL HFA/VENTOLIN HFA) 108 (90 BASE) MCG/ACT Inhaler     alfuzosin (UROXATRAL) 10 MG 24 hr tablet     AMLODIPINE BESYLATE PO     cefpodoxime (VANTIN) 100 MG tablet     diazepam (VALIUM) 10 MG tablet     METHOTREXATE PO     nitroglycerin (NITROSTAT) 0.4 MG SL tablet     predniSONE (DELTASONE) 20 MG tablet     No current facility-administered medications for this visit.          PHYSICAL EXAM:    /80 (BP Location: Left arm, Patient Position: Sitting, Cuff Size: Adult Regular)  Ht 1.88 m (6' 2\")  Wt 120.2 kg (265 lb)  BMI 34.02 kg/m2    HEENT: Normocephalic and atraumatic   Cardiac: Not done  Back/Flank: Not done  CNS/PNS: Not done  Respiratory: Normal non-labored breathing  Abdomen: Soft nontender and nondistended  Peripheral Vascular: Not done  Mental Status: Not done    Penis: Not done  Scrotal Skin: Not done  Testicles: Not done  Epididymis: Not done  Digital Rectal Exam:     Cystoscopy: Not done    Imaging: None    Urinalysis: UA RESULTS:  Recent Labs   Lab Test  09/19/12   0959   COLOR  Yellow   APPEARANCE  Clear   URINEGLC  Negative   URINEBILI  Negative   URINEKETONE  Negative   SG  1.016   UBLD  Negative   URINEPH  5.0   PROTEIN  Negative   NITRITE  Negative   LEUKEST  Small*   RBCU  0   WBCU  2       PSA:     Post Void Residual:     Other labs: None today      IMPRESSION:  72 y/o M with lower urinary tract symptoms and grade group 1 prostate cancer     PLAN:  Repeat PSA in 01/2019 prior to patient's departure for the winter   Will consider biopsy vs repeat PSA upon patient's return     Total Time: 15 minutes                                    Total in Consultation: greater than 50%         Zulay Lopez MD      "

## 2018-11-20 NOTE — NURSING NOTE
Chief Complaint   Patient presents with     Prostate Cancer     Pt is here for same PSA.      Lyly Bergman, HEATHER

## 2018-11-21 NOTE — PROGRESS NOTES
"    Health Psychology                  [facility]    Department of Medicine  Lou Fletcher, Ph.D., L.P. [phone]                          Clinic and Surgery Center  [facility] Sara Navarro, Ph.D.,  L.P. [phone]                 Health Psychology - 3rd floor  [place] Mail Code 741   Gregorio Medina, Ph.D., [place].ELYP., L.P. [phone]     [address] [place] [address] Siria Wyatt, Ph.D., L.P. [phone]  [place] [address]       Patient: Cy Seymour  Patient's YOB: 1945  MRN: [id number]  Psychologist: Sara Navarro, PhD,   Initial Evaluation Date: 11/21/2016      Health Psychology    History of Presenting Complaint at intake and recent visit:   Mr. Seymour is a 70 yo man with an extensive history of severe major depressive episodes superimposed on chronic dysthymic mood. He reports that his first major depressive episode occurred when he was in his early 30's.  He was diagnosed with ADHD a few years ago and was prescribed Adderall.  He had stopped taking, but reports that he recently returned to it and has found that he tolerates better a much lower dose than he had been previously prescribed.  Around that same time he was diagnosed with sleep apnea for which he received treatment.  He also reported a positive response to Viibryd which he took for depression (most other antidepressant treatments were not effective or had side-effects. He stopped taking this when he started dating his current wife.     Mr. Seymour reported that he has always had a \"depressive personality.\" Mr. Seymour's most recent major depressive episode occurred following the death of his first wife to lung cancer.  In the past he has had persistent suicidal ideation including a specific plan (e.g., overdosing on narcotic medication, shooting self).  He was hospitalized at one point and received ECT.      Mr. Seymour reported a seasonal " "variation in his depression, getting worse in the winter months.  He reports that he has not tried light therapy before to treat his depression.     Mr. Seymour reports that he is currently seeking treatment in order to work on his relationship with his current (2nd) wife.  He reports that he is her 4th .  He worries that she may have a personality disorder and feels it is negatively impacting their relationship.     Mr. Seymour describes himself with these terms: a loner, leadership qualities, opinionated (though often keeps opinions to himself), a caretaker, conflict-avoidant,    He feels he is living on \"bonus time\" because of health problems (e.g., recent heart ablation and stents).      Confidential Visit Summary    Service: Individual Psychotherapy     Start time:  10:05  Stop time:  11:00  Treatment modality:   Cognitive Behavioral Psychotherapy   Insight Oriented Psychotherapy   Patient s progress on goals:   Met patient in the lobby and escorted to the consultation room for visit.   Patient reported that he had not returned after initial appointment because he felt angry and frustrated that I had'nt been able to see him right away during the midday hours he was available.  Discussed what he called his narcissim that took time to get over the hurt, but now sees things differently.  Presented in a more open and engaged way than at our last visit.  Would like to work on his depression and improving his relationship with his wife.  Explored patterns in relationships and his identity.   Patient's response to treatment:  Engaged, reflective, and motivated   Participates fully.   Appearing to benefit from treatment.    Plan: Ongoing individual psychotherapy     Current mental health status:   General appearance:  Appropriate, well kept   Mood: Down   Affect: Mood Congruent   Cognition: Appropriate for age   Orientation: Times three   Insight: fair   Memory: Appropriate long term / Short " term   Psychosis: Denies   Suicidal / Homicidal Thoughts: Denies    Diagnosis:    Axis I: MDD, mild to moderate   Axis II: deferred   Axis III: Sleep apnea, also see above and EMR   Axis IV: limited social support        Goals and Interventions:   Problem Goals   Mood Disorder  Depression  Anxiety   [x] Decrease symptoms  [x] Improve well being  [] Improve coping  [] Change behavior/cognitions  [] Improve psychosocial support  [] Improve decision making  [x] Improve self-care   [] Improve sleep habits/sleep quality  [] Monitor psychological status      Relationship      [x] Improve coping  [x] Address unhealthy cognitive patterns  [] Improve decision making  [] Monitor psychological status  [] Monitor drug effects  [] Improve communication   [] Enhance relationship  [x] Improve psychosocial support       Interventions of Treatment   [x] Fostered healthy therapeutic alliance  [x] Addressed unhealthy cognitions  [] Addressed unhealthy behaviors  [] Relaxation training  [] Psycho-education  [] Bibliotherapy  [x] Provided support  [x] Explored/confronted resistance  [] Developed insights into cognitions/behaviors  [] Identified/Promoted adaptive coping strategies  [] Education/training in sleep   [] Education/training in mindfulness tools       Sara Navarro, PhD  Licensed Psychologist  Pager: [phone]

## 2018-11-25 NOTE — PROGRESS NOTES
"    Health Psychology                  [facility]    Department of Medicine  Lou Fletcher, Ph.D., L.P. [phone]                          Clinic and Surgery Center  [facility] Sara Navarro, Ph.D.,  L.P. [phone]                 Health Psychology - 3rd floor  [place] Mail Code 741   Gregorio Medina, Ph.D., BRENT[place].ELYP., L.P. [phone]     [address] [place] [address] Siria Wyatt, Ph.D., L.P. [phone]  [place] [address]       Patient: Cy Seymour  Patient's YOB: 1945  MRN: [id number]  Psychologist: Sara Navarro, PhD,   Initial Evaluation Date: 11/21/2016      Health Psychology    History of Presenting Complaint at intake and recent visit:   Mr. Seymour is a 72 yo man with an extensive history of severe major depressive episodes superimposed on chronic dysthymic mood. He reports that his first major depressive episode occurred when he was in his early 30's.  He was diagnosed with ADHD a few years ago and was prescribed Adderall.  He had stopped taking, but reports that he recently returned to it and has found that he tolerates better a much lower dose than he had been previously prescribed.  Around that same time he was diagnosed with sleep apnea for which he received treatment.  He also reported a positive response to Viibryd which he took for depression (most other antidepressant treatments were not effective or had side-effects. He stopped taking this when he started dating his current wife.     Mr. Seymour reported that he has always had a \"depressive personality.\" Mr. Seymour's most recent major depressive episode occurred following the death of his first wife to lung cancer.  In the past he has had persistent suicidal ideation including a specific plan (e.g., overdosing on narcotic medication, shooting self).  He was hospitalized at one point and received ECT.      Mr. Seymour reported a seasonal " "variation in his depression, getting worse in the winter months.  He reports that he has not tried light therapy before to treat his depression.     Mr. Seymour reports that he is currently seeking treatment in order to work on his relationship with his current (2nd) wife.  He reports that he is her 4th .  He worries that she may have a personality disorder and feels it is negatively impacting their relationship.     Mr. Seymour describes himself with these terms: a loner, leadership qualities, opinionated (though often keeps opinions to himself), a caretaker, conflict-avoidant,    He feels he is living on \"bonus time\" because of health problems (e.g., recent heart ablation and stents).      Confidential Visit Summary    Service: Individual Psychotherapy     Start time:  10:04  Stop time:  11:00  Treatment modality:   Cognitive Behavioral Psychotherapy   Insight Oriented Psychotherapy   Patient s progress on goals:   Met patient in the lobby and escorted to the consultation room for visit. 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  Discussed the career ending injury that happened to him.  Discussed ways in which his work history didn't live up to his expectations and how his mental health played a role in this.   Developing tools and insights.   Patient's response to treatment:  Engaged, reflective, and motivated   Participates fully.   Appearing to benefit from treatment.  Plan: Ongoing individual psychotherapy   Current mental health status:   General appearance:  Appropriate, well kept   Mood: down   Affect: Mood Congruent   Cognition: Appropriate for age   Orientation: Times three   Insight: fair   Memory: Appropriate long term / Short term   Psychosis: Denies   Suicidal / Homicidal Thoughts: Denies    Diagnosis:    Axis I: MDD, mild to moderate   Axis II: deferred   Axis III: Sleep apnea, also see above and EMR "   Axis IV: limited social support        Goals and Interventions:   Problem Goals   Mood Disorder  Depression  Anxiety   [] Decrease symptoms  [] Improve well being  [] Improve coping  [] Change behavior/cognitions  [] Improve psychosocial support  [] Improve decision making  [x] Improve self-care   [] Improve sleep habits/sleep quality  [] Monitor psychological status      Relationship      [x] Improve coping  [] Change behavior/cognitions  [] Improve decision making  [] Monitor psychological status  [] Monitor drug effects  [] Improve communication   [] Enhance relationship  [x] Improve psychosocial support  [] Other:               Interventions of Treatment   [x] Fostered healthy therapeutic alliance  [x] Addressed unhealthy cognitions  [] Addressed unhealthy behaviors  [] Relaxation training  [] Psycho-education  [] Bibliotherapy  [x] Provided support  [x] Explored/confronted resistance  [] Developed insights into cognitions/behaviors  [] Identified/Promoted adaptive coping strategies  [] Education/training in sleep   [] Education/training in mindfulness tools       Sara Navarro, PhD  Licensed Psychologist  Pager: [phone]

## 2019-01-15 DIAGNOSIS — C61 PROSTATE CANCER (H): Primary | ICD-10-CM

## 2019-04-01 ENCOUNTER — TRANSFERRED RECORDS (OUTPATIENT)
Dept: HEALTH INFORMATION MANAGEMENT | Facility: CLINIC | Age: 74
End: 2019-04-01

## 2019-04-02 ENCOUNTER — OFFICE VISIT (OUTPATIENT)
Dept: UROLOGY | Facility: CLINIC | Age: 74
End: 2019-04-02
Payer: MEDICARE

## 2019-04-02 ENCOUNTER — TELEPHONE (OUTPATIENT)
Dept: OPHTHALMOLOGY | Facility: CLINIC | Age: 74
End: 2019-04-02

## 2019-04-02 VITALS
HEIGHT: 74 IN | HEART RATE: 68 BPM | BODY MASS INDEX: 34.01 KG/M2 | WEIGHT: 265 LBS | SYSTOLIC BLOOD PRESSURE: 148 MMHG | DIASTOLIC BLOOD PRESSURE: 74 MMHG

## 2019-04-02 DIAGNOSIS — C61 PROSTATE CANCER (H): ICD-10-CM

## 2019-04-02 DIAGNOSIS — M35.3 POLYMYALGIA RHEUMATICA (H): Primary | ICD-10-CM

## 2019-04-02 LAB — PSA SERPL-MCNC: 2.3 NG/ML (ref 0–4)

## 2019-04-02 PROCEDURE — 36415 COLL VENOUS BLD VENIPUNCTURE: CPT | Performed by: UROLOGY

## 2019-04-02 PROCEDURE — 99213 OFFICE O/P EST LOW 20 MIN: CPT | Performed by: UROLOGY

## 2019-04-02 PROCEDURE — 84153 ASSAY OF PSA TOTAL: CPT | Performed by: UROLOGY

## 2019-04-02 RX ORDER — GABAPENTIN 800 MG/1
1200 TABLET ORAL 3 TIMES DAILY
Status: ON HOLD | COMMUNITY
End: 2020-04-17

## 2019-04-02 ASSESSMENT — PAIN SCALES - GENERAL: PAINLEVEL: NO PAIN (0)

## 2019-04-02 ASSESSMENT — MIFFLIN-ST. JEOR: SCORE: 2021.78

## 2019-04-02 NOTE — TELEPHONE ENCOUNTER
Spoke with patient to schedule surgery with Dr. Bette Curry    Surgery was scheduled on 04/04 at [facility]  Patient will have H&P at [facility]   Post-Op visit: not needed  Patient is aware a / is needed day of surgery.   Surgery packet was e-mailed, patient has my direct contact information for any further questions.

## 2019-04-02 NOTE — NURSING NOTE
Chief Complaint   Patient presents with     Clinic Care Coordination - Follow-up     History of Prostate Cancer      Leah Gillis LPN

## 2019-04-02 NOTE — LETTER
"2019       RE: Cy Seymour  [address]   [facility] [address]     Dear Colleague,    Thank you for referring your patient, Cy Seymour, to the [facility] [place] at [facility]. Please see a copy of my visit note below.    UROLOGIC DIAGNOSES:     CURRENT INTERVENTIONS:       HISTORY:     Pt is a very pleasant 71 yo white male who presents to office today complaining of enlarged prostate and interested in MRI fusion biopsy. He states he has been followed by another urologist in the past and was diagnosed with an enlarged prostate. He reports his PSA has been monitored over past several years and has ranged 3-8. He also says he has undergone 1-2 MRIs of prostate in past with positive lesions but these results are unavailable at this time. He was considering a \"Urolift\" procedure but was told he should undergo prostate biospy prior to this intervention to rule out prostate cancer. He was scheduled for biopsy at his previous urologist but opted to pursue MRI fusion biopsy with another provider.    His symptoms include nocturia, on average 5 times per night but as severe as 10 times per night. He also c/o of slow stream, starting and stopping, urgency with rare instances of leakage and incontinence. He does not wear pads/diapers. He has been prescribed medication for BPH in the past but has never taken anything.    Denies history of UTIs, kidney infections, or kidney stones.He reports a positive family hx of prostate cancer in father ( of CHF at 88 yo; tx unknown) and brother (living at 78 yo; tx with radiation).      Patient s/p prostate biopsy complicated by UTI with fever. Patient also with orchitis with reactive hydrocele. U/s today showed reactive hydrocele but no abscess.     Patient states today that his urinary symptoms are tolerable. Did not have UDS, did not schedule for TURP. Not taking alfuzosin. "     Had MRI prostate that did not show any suspicious lesions.   PSA was 10.8, 9.3 previously.     PSA today was 2.3    We discussed PSA result, MRI results, alpha blocker use, lower urinary tract symptoms.       PAST MEDICAL HISTORY:   Past Medical History:   Diagnosis Date     ADHD (attention deficit hyperactivity disorder)      Aneurysm artery, iliac (H)     father had AAA surgery     Anxiety      Atrial fibrillation (H) 5/9/2011     Bicuspid aortic valve      Coronary artery disease     ablation 2011     Depressive disorder      Diverticulitis      Gastro-oesophageal reflux disease      Hearing loss      History of spinal cord injury      Hypertension      Other chronic pain      Polymyalgia rheumatica (H)      Prostate infection      Restless legs      S/P nasal septoplasty     1998     Seasonal affective disorder (H)      Sleep apnea      Tachycardia     exercise initiated       PAST SURGICAL HISTORY:   Past Surgical History:   Procedure Laterality Date     BACK SURGERY      cervical fusion 2005     BLEPHAROPLASTY BILATERAL  2/27/2012    Procedure:BLEPHAROPLASTY BILATERAL; BILATERAL UPPER LID BLEPHAROPLASTY ; Surgeon:JESSE LINDSEY; Location:[facility]     CARDIAC SURGERY      stent 2003     CYSTOSCOPY       ORTHOPEDIC SURGERY      bilat hip replaced 2008,2009     PROSTATE SURGERY         FAMILY HISTORY:   Family History   Problem Relation Age of Onset     Arthritis Mother      Hypertension Mother      Hypertension Father      Heart Failure Father      Heart Disease Maternal Grandfather      Heart Disease Paternal Grandmother        SOCIAL HISTORY:   Social History     Tobacco Use     Smoking status: Never Smoker     Smokeless tobacco: Never Used   Substance Use Topics     Alcohol use: Yes     Comment: 1-2 alcoholic beverages per month       Current Outpatient Medications   Medication     Amphetamine-Dextroamphetamine (ADDERALL PO)     carvedilol (COREG) 6.25 MG tablet     gabapentin (NEURONTIN) 800 MG tablet      "predniSONE (DELTASONE) 20 MG tablet     bacitracin 500 UNIT/GM ophthalmic ointment     sildenafil (VIAGRA) 100 MG cap/tab     No current facility-administered medications for this visit.      Facility-Administered Medications Ordered in Other Visits   Medication     lactated ringers infusion     meperidine (DEMEROL) injection 12.5 mg     naloxone (NARCAN) injection 0.1-0.4 mg     ondansetron (ZOFRAN-ODT) ODT tab 4 mg    Or     ondansetron (ZOFRAN) injection 4 mg     ORAL Pain Medications -  may administer as ordered by surgeon for take home use     prochlorperazine (COMPAZINE) injection 5 mg         PHYSICAL EXAM:    /74 (BP Location: Left arm)   Pulse 68   Ht 1.88 m (6' 2\")   Wt 120.2 kg (265 lb)   BMI 34.02 kg/m       HEENT: Normocephalic and atraumatic   Cardiac: Not done  Back/Flank: Not done  CNS/PNS: Not done  Respiratory: Normal non-labored breathing  Abdomen: Soft nontender and nondistended  Peripheral Vascular: Not done  Mental Status: Not done    Penis: Not done  Scrotal Skin: Not done  Testicles: Not done  Epididymis: Not done  Digital Rectal Exam:     Cystoscopy: Not done    Imaging: None    Urinalysis: UA RESULTS:  Recent Labs   Lab Test  09/19/12   0959   COLOR  Yellow   APPEARANCE  Clear   URINEGLC  Negative   URINEBILI  Negative   URINEKETONE  Negative   SG  1.016   UBLD  Negative   URINEPH  5.0   PROTEIN  Negative   NITRITE  Negative   LEUKEST  Small*   RBCU  0   WBCU  2       PSA:     Post Void Residual:     Other labs: None today      IMPRESSION:  74 y/o M with lower urinary tract symptoms and grade group 1 prostate cancer     PLAN:  Continue alpha blocker   Repeat PSA in six months   MRI prostate in six months   Follow up in six months         Total Time: 15 minutes                                    Total in Consultation: greater than 50%       Again, thank you for allowing me to participate in the care of your patient.      Sincerely,    Zulay Lopez MD      "

## 2019-04-03 ENCOUNTER — ANESTHESIA EVENT (OUTPATIENT)
Dept: SURGERY | Facility: AMBULATORY SURGERY CENTER | Age: 74
End: 2019-04-03

## 2019-04-03 ENCOUNTER — OFFICE VISIT (OUTPATIENT)
Dept: SURGERY | Facility: CLINIC | Age: 74
End: 2019-04-03
Payer: MEDICARE

## 2019-04-03 ENCOUNTER — PRE VISIT (OUTPATIENT)
Dept: OPHTHALMOLOGY | Facility: CLINIC | Age: 74
End: 2019-04-03

## 2019-04-03 VITALS
OXYGEN SATURATION: 96 % | BODY MASS INDEX: 34.65 KG/M2 | SYSTOLIC BLOOD PRESSURE: 155 MMHG | HEART RATE: 74 BPM | HEIGHT: 74 IN | WEIGHT: 270 LBS | RESPIRATION RATE: 18 BRPM | TEMPERATURE: 98.4 F | DIASTOLIC BLOOD PRESSURE: 92 MMHG

## 2019-04-03 DIAGNOSIS — Z01.818 PREOP EXAMINATION: Primary | ICD-10-CM

## 2019-04-03 ASSESSMENT — ENCOUNTER SYMPTOMS: DYSRHYTHMIAS: 1

## 2019-04-03 ASSESSMENT — LIFESTYLE VARIABLES: TOBACCO_USE: 0

## 2019-04-03 ASSESSMENT — MIFFLIN-ST. JEOR: SCORE: 2044.46

## 2019-04-03 ASSESSMENT — PAIN SCALES - GENERAL: PAINLEVEL: NO PAIN (0)

## 2019-04-03 NOTE — PATIENT INSTRUCTIONS
Preparing for Your Surgery      Name:  Cy Seymour   MRN:  [id number]   :  1945   Today's Date:  4/3/2019     Arriving for surgery:  Surgery date:  19  Arrival time:  12:00 pm  Please come to:     [facility]  [address] [place] [id number]     Parking is available in front of the Clinics and Surgery Center building from 5:30AM to 8:00PM.  -  Proceed to the 5th floor to check into the Ambulatory Surgery Center.              >> There will be patient concierges on the 1st and 5th floor, for assistance or an escort, if you would like.              >> Please call [phone] with any questions.    What can I eat or drink?  -  You may have solid food or milk products until 8 hours prior to your surgery.  -  You may have water, apple juice or 7up/Sprite until 2 hours prior to your surgery.    Which medicines can I take?  Stop Aspirin, vitamins and supplements one week prior to surgery.  Hold Ibuprofen for 24 hours and/or Naproxen for 48 hours prior to surgery.   -  Please take these medications the day of surgery:  Tylenol if needed; take all medications normally taken in the morning.    How do I prepare myself?  -  Take two showers: one the night before surgery; and one the morning of surgery.         Use Scrubcare or Hibiclens to wash from neck down.  You may use your own     shampoo and conditioner. No other hair products.   -  Do NOT use lotion, powder, deodorant, or antiperspirant the day of your surgery.  -  Do NOT wear any jewelry.  - Do not bring your own medications to the hospital, except for inhalers and eye   drops.  -  Bring your ID and insurance card.    Questions or Concerns:    -If you are scheduled at the Ambulatory Surgery Center please call [phone].    -For questions after surgery please call your surgeons office.

## 2019-04-03 NOTE — H&P
Pre-Operative H & P     CC:  Preoperative exam to assess for increased cardiopulmonary risk while undergoing surgery and anesthesia.    Date of Encounter: 4/3/2019  Primary Care Physician:  Lisa Mcleod  Reason for visit: Polymyalgia rheumatica (H) [M35.3]  - Primary   HPI  Cy Seymour is a 73 year old male who presents for pre-operative H & P in preparation for left temporal artery biopsy with Dr. Curry on 4/4/19 at [facility]. History is obtained from the patient.     Patient with history of longstanding polymyalgia rheumatica, with shoulder and hip girdle tenderness followed by outside provider. He presented urgently to clinic on 4/1/19 for evaluation of myalgias and severe left side headache, beginning two weeks ago. Due to concern for temporal artery arteritis he was referred to Dr. Curry for above biopsy.     For his polymyalgia rheumatica, patient had been on Prednisone until 6/2018. He had stopped taking Methotrexate due to hair loss. He began taking Prednisone 10 mg again on 4/1/19 due to his recent severe pain. He has now had 40 mg the past two days and his pain has been relieved.     His history is otherwise significant for HTN, bicuspid aortic valve with mild stenosis, atrial fibrillation, CAD, s/p PCI, iliac aneurysm, IVANA, depression, anxiety, ADHD, RLS, peripheral neuropathy, and prostate cancer. For his cardiac issues he is followed by Cardiologist. Dr. Darshana Lbaoy, last visit with team on 7/27/18. Medications were adjusted and he was sent for a stress test with results below. He reportedly was instructed to return in one year.       Past Medical History  Past Medical History:   Diagnosis Date     ADHD (attention deficit hyperactivity disorder)      Aneurysm artery, iliac (H)     father had AAA surgery     Anxiety      Atrial fibrillation (H) 5/9/2011     Bicuspid aortic valve      Coronary artery disease     ablation 2011      Depressive disorder      Diverticulitis      Gastro-oesophageal reflux disease      Hearing loss      History of spinal cord injury      Hypertension      Other chronic pain      Polymyalgia rheumatica (H)      Prostate infection      Restless legs      S/P nasal septoplasty     1998     Seasonal affective disorder (H)      Sleep apnea      Tachycardia     exercise initiated       Past Surgical History  Past Surgical History:   Procedure Laterality Date     BACK SURGERY      cervical fusion 2005     BLEPHAROPLASTY BILATERAL  2/27/2012    Procedure:BLEPHAROPLASTY BILATERAL; BILATERAL UPPER LID BLEPHAROPLASTY ; Surgeon:READER, JESSE RAMIREZ; Location:[facility]     CARDIAC SURGERY      stent 2003     CYSTOSCOPY       ORTHOPEDIC SURGERY      bilat hip replaced 2008,2009     PROSTATE SURGERY         Hx of Blood transfusions/reactions: Yes, multiple in 2009.     Hx of abnormal bleeding or anti-platelet use: No longer taking aspirin.    Menstrual history: No LMP for male patient.    Steroid use in the last year: Yes 40 mg of Prednisone for the past two days.     Personal or FH with difficulty with Anesthesia:  Denies.     Prior to Admission Medications  Current Outpatient Medications   Medication Sig Dispense Refill     Amphetamine-Dextroamphetamine (ADDERALL PO) Take 10 mg by mouth        aspirin 81 MG chewable tablet Take 81 mg by mouth daily        carvedilol (COREG) 6.25 MG tablet        gabapentin (NEURONTIN) 800 MG tablet Take 1,200 mg by mouth 3 times daily        nitroglycerin (NITROSTAT) 0.4 MG SL tablet Place 1 tablet under the tongue every 5 minutes as needed for chest pain. (Patient not taking: Reported on 8/21/2018) 10 tablet 0     predniSONE (DELTASONE) 20 MG tablet Take 1 tablet (20 mg) by mouth daily (Patient taking differently: Take 40 mg by mouth daily .) 5 tablet 0     sildenafil (VIAGRA) 100 MG cap/tab Take 1 tablet (100 mg) by mouth daily as needed for erectile dysfunction Do not take with NTG medication  (Patient not taking: Reported on 4/2/2019) 10 tablet 1       Allergies  Allergies   Allergen Reactions     Hmg-Coa-R Inhibitors Muscle Pain (Myalgia)     Other reaction(s): Myalgia     Oxycodone Other (See Comments)     Other reaction(s): Hallucinations  Hallucinations.     Sulfasalazine      Sulfa Drugs Rash       Social History  Social History     Socioeconomic History     Marital status:      Spouse name: Not on file     Number of children: Not on file     Years of education: Not on file     Highest education level: Not on file   Occupational History     Not on file   Social Needs     Financial resource strain: Not on file     Food insecurity:     Worry: Not on file     Inability: Not on file     Transportation needs:     Medical: Not on file     Non-medical: Not on file   Tobacco Use     Smoking status: Never Smoker     Smokeless tobacco: Never Used   Substance and Sexual Activity     Alcohol use: Yes     Comment: 1-2 alcoholic beverages per month     Drug use: No     Sexual activity: Not on file     Comment:    Lifestyle     Physical activity:     Days per week: Not on file     Minutes per session: Not on file     Stress: Not on file   Relationships     Social connections:     Talks on phone: Not on file     Gets together: Not on file     Attends Bahai service: Not on file     Active member of club or organization: Not on file     Attends meetings of clubs or organizations: Not on file     Relationship status: Not on file     Intimate partner violence:     Fear of current or ex partner: Not on file     Emotionally abused: Not on file     Physically abused: Not on file     Forced sexual activity: Not on file   Other Topics Concern     Parent/sibling w/ CABG, MI or angioplasty before 65F 55M? Yes   Social History Narrative     Not on file       Family History  Family History   Problem Relation Age of Onset     Arthritis Mother      Hypertension Mother      Hypertension Father      Heart Failure  Father      Heart Disease Maternal Grandfather      Heart Disease Paternal Grandmother        Review of Systems    ROS/MED HX    The complete review of systems is negative other than noted in the HPI or here.   ENT/Pulmonary:   No CPAP  (+)sleep apnea, , . .   (-) tobacco use   Neurologic: Comment: RLS    (+)other neuro severe HA    Cardiovascular: Comment: Iliac artery aneurysm    (+) hypertension-Peripheral Vascular Disease-- Other, CAD, --stent,. Taking blood thinners Pt has received instructions: . . . :. valvular problems/murmurs bicuspid aortic valve with mild stenosis:. Previous cardiac testing date:results:date: results:ECG reviewed date:5/20/18 results:SR with frequent PVCs date: results:          METS/Exercise Tolerance:  >4   Hematologic:  History of blood transfusion      Musculoskeletal: Comment: Polymyalgia rheumatica        GI/Hepatic:  - neg GI/hepatic ROS       Renal/Genitourinary:  - ROS Renal section negative       Endo: Comment: Currently on Prednisone         Psychiatric:  - neg psychiatric ROS       Infectious Disease:  - neg infectious disease ROS       Malignancy:         Other:    (+) No chance of pregnancy C-spine cleared: N/A, H/O Chronic Pain,no other significant disability            PHYSICAL EXAM:   Mental Status/Neuro: A/A/O; Age Appropriate   Airway: Facies: Feasible  Mallampati: I  Mouth/Opening: Full  TM distance: > 6 cm  Neck ROM: Limited   Respiratory: Auscultation: CTAB     Resp. Rate: Normal     Resp. Effort: Normal      CV: Rhythm: Regular  Heart: Normal Sounds   Comments:      Preop Vitals  BP Readings from Last 3 Encounters:   04/02/19 148/74   11/20/18 128/80   08/21/18 139/80    Pulse Readings from Last 3 Encounters:   04/02/19 68   04/19/18 61   12/28/17 62      Resp Readings from Last 3 Encounters:   02/27/12 16   11/08/11 16   10/11/11 15    SpO2 Readings from Last 3 Encounters:   04/19/18 97%   12/28/17 94%   08/21/17 94%      Temp Readings from Last 1 Encounters:  "  12/28/17 98  F (36.7  C) (Oral)    Ht Readings from Last 1 Encounters:   04/02/19 1.88 m (6' 2\")      Wt Readings from Last 1 Encounters:   04/02/19 120.2 kg (265 lb)    Estimated body mass index is 34.02 kg/m  as calculated from the following:    Height as of 4/2/19: 1.88 m (6' 2\").    Weight as of 4/2/19: 120.2 kg (265 lb).       Temp: 98.4  F (36.9  C) Temp src: Oral BP: (!) 155/92 Pulse: 74   Resp: 18 SpO2: 96 %         270 lbs 0 oz  6' 2\"   Body mass index is 34.67 kg/m .       Physical Exam  Constitutional: Awake, alert, cooperative, no apparent distress, and appears stated age.  Eyes: Pupils equal, round and reactive to light, extra ocular muscles intact, sclera clear, conjunctiva normal.  HENT: Normocephalic, oral pharynx with moist mucus membranes, good dentition. No goiter appreciated.   Respiratory: Clear to auscultation bilaterally, no crackles or wheezing. No cough or obvious dyspnea.  Cardiovascular: Regular rate and rhythm, normal S1 and S2, and no murmur noted.  Carotids +2, no bruits. No edema. Palpable pulses to radial  DP and PT arteries.   GI: Normal bowel sounds, soft, non-distended, non-tender, no masses palpated. Difficult exam due to obese abdomen.  Lymph/Hematologic: No cervical lymphadenopathy and no supraclavicular lymphadenopathy.  Genitourinary: Deferred.   Skin: Warm and dry.  No rashes at anticipated surgical site.   Musculoskeletal: Limited ROM of neck. There is no redness, warmth, or swelling of the joints. Gross motor strength is normal.    Neurologic: Awake, alert, oriented to name, place and time. Cranial nerves II-XII are grossly intact. Gait is normal.   Neuropsychiatric: Calm, cooperative. Normal affect.     Labs: (personally reviewed) OSH 4/1/19  WBC 10.8  Hgb 15.6  hematocrit 47.9  Platelets 284  11/3/18   SODIUM 135 - 145 mmol/L 141    POTASSIUM 3.5 - 5.0 mmol/L 4.3    CHLORIDE 98 - 110 mmol/L 106    CO2,TOTAL 21 - 31 mmol/L 26    ANION GAP 5 - 18  9    GLUCOSE 65 - 100 " mg/dL 113 Abnormally high     CALCIUM 8.5 - 10.5 mg/dL 9.8    BUN 8 - 25 mg/dL 13    CREATININE 0.72 - 1.25 mg/dL 1.13    BUN/CREAT RATIO           10 - 20  12    GFR if African American >60 ml/min/1.73m2 >60    GFR if not African American >60 ml/min/1.73m2 >60    A1C 6.1  EKG: Personally reviewed 5/20/18 Sinus rhythm with frequent PVCs  Cardiac echo: 7/20/2018  Results:  Normal left ventricular size. Borderline normal left ventricular ejection fraction estimated at 50%.  Basilar inferior and posterior hypokinesis.  Mild left ventricular hypertrophy.  Mild to moderate left atrial dilation.  No significant valvular heart disease noted.  Right ventricular systolic pressure cannot be accurately estimated.  Stress test: Myocardial perfusion study 8/6/18  Conclusion  1. Myocardial perfusion imaging is abnormal.  2. Stress SPECT images demonstrate a small sized fixed defect of mild   severity in the apical wall and a small to medium sized mostly fixed   defect of moderate severity in the lateral wall with minimal   reversibility.  3. Gated stress SPECT imaging reveals normal wall motion  4. Calculated LVEF 62%.    Cardiolite Stress test 10/5/2017  CONCLUSIONS  1.  No evidence of ischemia on stress ECG; there were rare isolated PVCs, venticular couplets and triplets.  2.  There is a small fixed apical perfusion defect that is present on both resting and prone images.  This may represent a small infarct vs.   apical thinning.  3.  The left ventricle is dilated with low normal systolic function.  LVEF 55%.    2015 Angiogram  Summary/Conclusions  PRESENTATION / INDICATIONS   [place]  DIAGNOSTIC SUMMARY   The LMCA is free of significant disease.   The LAD is free of significant disease.   The Circumflex is free of significant disease.   The 2nd Marginal is free of significant disease.   50% stenosis in the Proximal RCA   100% stenosis in the Proximal RCA (Restenosis - Drug Eluting Stent)    LEFT VENTRICULAR FUNCTION   Left  ventricular ejection fraction is 65%. LV Pressure = 132/10.    Imaging and cardiac testing reviewed by this provider    Outside records reviewed from: Care Everywhere and [facility]     ASSESSMENT and PLAN  Cy Seymour is a 73 year old male scheduled to undergo left temporal artery biopsy with Dr. Curry on 4/4/19. He has the following specific operative considerations:   - RCRI : No serious cardiac risks.    - Anesthesia considerations:  Refer to PAC assessment in anesthesia records  - VTE risk: 1.8%  - IVANA # of risks 4/8 = Intermediate risk  - Risk of PONV score = 2.  If > 2, anti-emetic intervention recommended.    --Severe left side headache and myalgias in patient with longstanding polymyalgia rheumatica. Above biopsy planned with MAC and local. Patient comfortable with plan.   --HTN. Will take Coreg on DOS. CAD, s/p PCI to RCA 2003, and PCI to LAD 2012. Chronically occluded mid RCA at stent. Smaller RCA that does get some L to R collaterals. Iliac aneurysm, followed, bicuspid aortic valve with mild stenosis, atrial fibrillation s/p ablation in 2011. Denies cardiac symptoms, no use of NTG. All testing above including last stress test in 8/2018. Patient reports that he was asked to return in one year. Good exercise tolerance. Has stopped taking aspirin on his own some time ago.  --Nonsmoker. Denies pulmonary symptoms. Multiple sleep studies with reportedly inconclusive results due to his in ability to sleep. No CPAP. Patient believes that he might have narcolepsy.   --RLS. Will take Gabapentin on DOS.   --Poymyalgia rheumatica, well followed. Shoulder and hip girdle pain. Has taken 40 mg of Prednisone for current symptoms the last two days and will take on DOS. Last use was in 6/2018.   --ADHD. Adderall daily but will not take on DOS.   --Past history of blood transfusion in 2009.     Arrival time, NPO, shower and medication instructions provided by nursing staff  today. Preparing For Your Surgery handout given.    Patient was discussed with Dr Gunter.    ANDRZEJ Parker CNS  Preoperative Assessment Center  [facility]  [facility] and [facility]  Phone: [phone]  Fax: [phone]

## 2019-04-03 NOTE — ANESTHESIA PREPROCEDURE EVALUATION
Anesthesia Pre-Procedure Evaluation    Patient: Cy Seymour   MRN:     [id number] Gender:   male   Age:    73 year old :      1945        Preoperative Diagnosis: Rule Out Temporal Arteries, Polymyalgia Rheumatica   Procedure(s):  Left Temporal Artery Biopsy     Past Medical History:   Diagnosis Date     ADHD (attention deficit hyperactivity disorder)      Aneurysm artery, iliac (H)     father had AAA surgery     Anxiety      Atrial fibrillation (H) 2011     Bicuspid aortic valve      Coronary artery disease     ablation      Depressive disorder      Diverticulitis      Gastro-oesophageal reflux disease      Hearing loss      History of spinal cord injury      Hypertension      Other chronic pain      Polymyalgia rheumatica (H)      Prostate infection      Restless legs      S/P nasal septoplasty          Seasonal affective disorder (H)      Sleep apnea      Tachycardia     exercise initiated      Past Surgical History:   Procedure Laterality Date     BACK SURGERY      cervical fusion      BLEPHAROPLASTY BILATERAL  2012    Procedure:BLEPHAROPLASTY BILATERAL; BILATERAL UPPER LID BLEPHAROPLASTY ; Surgeon:CÉSAR, JESSE RAMIREZ; Location:[facility]     CARDIAC SURGERY      stent      CYSTOSCOPY       ORTHOPEDIC SURGERY      bilat hip replaced ,     PROSTATE SURGERY            Anesthesia Evaluation     . Pt has had prior anesthetic. Type: General and MAC    No history of anesthetic complications          ROS/MED HX    ENT/Pulmonary:     (+)sleep apnea, doesn't use CPAP , . .   (-) tobacco use   Neurologic: Comment: RLS    (+)other neuro recent severe HA    Cardiovascular: Comment: Iliac artery aneurysm. S/p ablation for atrial fibrillation .    (+) hypertension-Peripheral Vascular Disease-- Other, CAD, --stent,2012  2 . : . . . :. dysrhythmias a-fib, valvular problems/murmurs bicuspid aortic valve with mild stenosis:. Previous cardiac testing Echodate:2018results:Stress  Testdate:8/2018 results:ECG reviewed date:5/20/18 results:SR with frequent PVCsCath date: 2015 results:         (-) taking anticoagulants/antiplatelets   METS/Exercise Tolerance:  >4 METS   Hematologic:     (+) History of Transfusion no previous transfusion reaction -      Musculoskeletal: Comment: Polymyalgia rheumatica        GI/Hepatic:  - neg GI/hepatic ROS       Renal/Genitourinary:  - ROS Renal section negative       Endo: Comment: Currently on Prednisone 40 mg for past two days        Psychiatric:  - neg psychiatric ROS       Infectious Disease:  - neg infectious disease ROS       Malignancy:      - no malignancy   Other:    (+) No chance of pregnancy C-spine cleared: N/A, H/O Chronic Pain,no other significant disability                        PHYSICAL EXAM:   Mental Status/Neuro: A/A/O; Age Appropriate   Airway: Facies: Feasible  Mallampati: I  Mouth/Opening: Full  TM distance: > 6 cm  Neck ROM: Limited   Respiratory: Auscultation: CTAB     Resp. Rate: Normal     Resp. Effort: Normal      CV: Rhythm: Regular  Heart: Normal Sounds   Comments:      Dental: Normal                  Lab Results   Component Value Date    WBC 13.7 (H) 08/21/2017    HGB 14.2 08/21/2017    HCT 43.1 08/21/2017     08/21/2017    CRP 7.1 09/05/2017    SED 20 09/05/2017     08/09/2011    POTASSIUM 4.0 08/09/2011    CHLORIDE 102 08/09/2011    CO2 28 08/09/2011    BUN 18 08/09/2011    CR 0.90 08/09/2011    .0 09/06/2016    ABDULKADIR 9.7 08/09/2011    ALBUMIN 4.4 07/07/2009    PROTTOTAL 7.4 07/07/2009    ALT 20.0 09/06/2016    AST 22.0 09/06/2016    ALKPHOS 102 07/07/2009    BILITOTAL 0.5 07/07/2009    INR 2.4 01/31/2012    TSH 1.11 04/17/2013       Preop Vitals  BP Readings from Last 3 Encounters:   04/02/19 148/74   11/20/18 128/80   08/21/18 139/80    Pulse Readings from Last 3 Encounters:   04/02/19 68   04/19/18 61   12/28/17 62      Resp Readings from Last 3 Encounters:   02/27/12 16   11/08/11 16   10/11/11 15    SpO2  "Readings from Last 3 Encounters:   04/19/18 97%   12/28/17 94%   08/21/17 94%      Temp Readings from Last 1 Encounters:   12/28/17 98  F (36.7  C) (Oral)    Ht Readings from Last 1 Encounters:   04/02/19 1.88 m (6' 2\")      Wt Readings from Last 1 Encounters:   04/02/19 120.2 kg (265 lb)    Estimated body mass index is 34.02 kg/m  as calculated from the following:    Height as of 4/2/19: 1.88 m (6' 2\").    Weight as of 4/2/19: 120.2 kg (265 lb).     LDA:            Assessment:   ASA SCORE: 2       Documentation: H&P complete; Preop Testing complete; Consents complete   Proceeding: Proceed without further delay  Tobacco Use:  NO Active use of Tobacco/UNKNOWN Tobacco use status     Plan:   Anes. Type:  General   Pre-Induction: Midazolam IV; Acetaminophen PO   Induction:  IV (Standard)   Airway: Oral ETT   Access/Monitoring: PIV   Maintenance: Balanced   Emergence: Procedure Site   Logistics: Same Day Surgery     Postop Pain/Sedation Strategy:  Standard-Options: Opioids PRN     PONV Management:  Adult Risk Factors:, Non-Smoker, Postop Opioids  Prevention: Ondansetron; Dexamethasone     CONSENT: Direct conversation   Plan and risks discussed with: Patient                     PAC Discussion and Assessment    ASA Classification: 3  Case is suitable for: ASC  Anesthetic techniques and relevant risks discussed: MAC with GA as backup  Invasive monitoring and risk discussed: No  Types:   Possibility and Risk of blood transfusion discussed: No  NPO instructions given:   Additional anesthetic preparation and risks discussed:   Needs early admission to pre-op area:   Other:     PAC Resident/NP Anesthesia Assessment:  Cy Seymour is a 73 year old male scheduled to undergo left temporal artery biopsy with Dr. Curry on 4/4/19. He has the following specific operative considerations:   - RCRI : No serious cardiac risks.    - VTE risk: 1.8%  - IVANA # of risks 4/8 = Intermediate risk  - Risk of PONV score = 2.  If > " 2, anti-emetic intervention recommended.    --Severe left side headache and myalgias in patient with longstanding polymyalgia rheumatica. Above biopsy planned with MAC and local. Patient comfortable with plan.   --HTN. Will take Coreg on DOS. CAD, s/p PCI to RCA 2003, and PCI to LAD 2012. Chronically occluded mid RCA at stent. Smaller RCA that does get some L to R collaterals. Iliac aneurysm, followed, bicuspid aortic valve with mild stenosis, atrial fibrillation s/p ablation in 2011. Denies cardiac symptoms, no use of NTG. All testing above including last stress test in 8/2018. Patient reports that he was asked to return in one year. Good exercise tolerance. Has stopped taking aspirin on his own some time ago.  --Nonsmoker. Denies pulmonary symptoms. Multiple sleep studies with reportedly inconclusive results due to his in ability to sleep. No CPAP. Patient believes that he might have narcolepsy.   --RLS. Will take Gabapentin on DOS.   --Poymyalgia rheumatica, well followed. Shoulder and hip girdle pain. Has taken 40 mg of Prednisone for current symptoms the last two days and will take on DOS. Last use was in 6/2018.   --ADHD. Adderall daily but will not take on DOS.   --Past history of blood transfusion in 2009.     Patient was discussed with Dr Gunter.        Reviewed and Signed by PAC Mid-Level Provider/Resident  Mid-Level Provider/Resident: ANDRZEJ Ornelas, CHAD  Date: 4/3/19  Time: 2:42pm    Attending Anesthesiologist Anesthesia Assessment:  73 year old for temporal artery biopsy in ongoing management of polymyalgia rheumatica. Patient was doing well, off steroids until a few months ago, the increasing stiffness, pain. Patient has known bicuspid aortic valve, but only mild stenosis as of 7/2018.     Patient/case discussed with BUZZ/resident; agree with above assessment. No need to see patient. Patient is appropriate for the planned procedure without further work-up or medical management.    Анна Gunter MD           Anesthesiologist:   Date:   Time:   Pass/Fail:   Disposition:     PAC Pharmacist Assessment:        Pharmacist:   Date:   Time:        [place]am [place], ANDRZEJ CNS

## 2019-04-03 NOTE — TELEPHONE ENCOUNTER
FUTURE VISIT INFORMATION      FUTURE VISIT INFORMATION:    Date: 4/10/19    Time: 8:00am    Location: [facility]  REFERRAL INFORMATION:    Referring provider:  Antoinette    Referring providers clinic:  [facility]    Reason for visit/diagnosis  left temporal artery biopsy being done 4/4 with Antoinette    RECORDS REQUESTED FROM:       Clinic name Comments Records Status Imaging Status   [facility] left temporal artery biopsy being preformed 4/4/19  Phone notes 4/2/19 EPIC    [facility] Records scanned into chart 4/1/19 EPIC

## 2019-04-04 ENCOUNTER — HOSPITAL ENCOUNTER (OUTPATIENT)
Facility: AMBULATORY SURGERY CENTER | Age: 74
End: 2019-04-04
Attending: OPHTHALMOLOGY
Payer: MEDICARE

## 2019-04-04 ENCOUNTER — ANESTHESIA (OUTPATIENT)
Dept: SURGERY | Facility: AMBULATORY SURGERY CENTER | Age: 74
End: 2019-04-04

## 2019-04-04 VITALS
BODY MASS INDEX: 34.01 KG/M2 | OXYGEN SATURATION: 94 % | WEIGHT: 265 LBS | SYSTOLIC BLOOD PRESSURE: 130 MMHG | DIASTOLIC BLOOD PRESSURE: 87 MMHG | TEMPERATURE: 98 F | HEIGHT: 74 IN | RESPIRATION RATE: 14 BRPM

## 2019-04-04 DIAGNOSIS — Z98.890 POST-OPERATIVE STATE: Primary | ICD-10-CM

## 2019-04-04 PROCEDURE — 88305 TISSUE EXAM BY PATHOLOGIST: CPT | Performed by: OPHTHALMOLOGY

## 2019-04-04 PROCEDURE — 88313 SPECIAL STAINS GROUP 2: CPT | Performed by: OPHTHALMOLOGY

## 2019-04-04 RX ORDER — SODIUM CHLORIDE, SODIUM LACTATE, POTASSIUM CHLORIDE, CALCIUM CHLORIDE 600; 310; 30; 20 MG/100ML; MG/100ML; MG/100ML; MG/100ML
INJECTION, SOLUTION INTRAVENOUS CONTINUOUS
Status: DISCONTINUED | OUTPATIENT
Start: 2019-04-04 | End: 2019-04-05 | Stop reason: HOSPADM

## 2019-04-04 RX ORDER — FENTANYL CITRATE 50 UG/ML
25-50 INJECTION, SOLUTION INTRAMUSCULAR; INTRAVENOUS EVERY 5 MIN PRN
Status: DISCONTINUED | OUTPATIENT
Start: 2019-04-04 | End: 2019-04-04 | Stop reason: HOSPADM

## 2019-04-04 RX ORDER — ONDANSETRON 2 MG/ML
4 INJECTION INTRAMUSCULAR; INTRAVENOUS EVERY 30 MIN PRN
Status: DISCONTINUED | OUTPATIENT
Start: 2019-04-04 | End: 2019-04-05 | Stop reason: HOSPADM

## 2019-04-04 RX ORDER — ONDANSETRON 2 MG/ML
INJECTION INTRAMUSCULAR; INTRAVENOUS PRN
Status: DISCONTINUED | OUTPATIENT
Start: 2019-04-04 | End: 2019-04-04

## 2019-04-04 RX ORDER — NALOXONE HYDROCHLORIDE 0.4 MG/ML
.1-.4 INJECTION, SOLUTION INTRAMUSCULAR; INTRAVENOUS; SUBCUTANEOUS
Status: DISCONTINUED | OUTPATIENT
Start: 2019-04-04 | End: 2019-04-05 | Stop reason: HOSPADM

## 2019-04-04 RX ORDER — LIDOCAINE 40 MG/G
CREAM TOPICAL
Status: DISCONTINUED | OUTPATIENT
Start: 2019-04-04 | End: 2019-04-04 | Stop reason: HOSPADM

## 2019-04-04 RX ORDER — ONDANSETRON 4 MG/1
4 TABLET, ORALLY DISINTEGRATING ORAL EVERY 30 MIN PRN
Status: DISCONTINUED | OUTPATIENT
Start: 2019-04-04 | End: 2019-04-05 | Stop reason: HOSPADM

## 2019-04-04 RX ORDER — SODIUM CHLORIDE, SODIUM LACTATE, POTASSIUM CHLORIDE, CALCIUM CHLORIDE 600; 310; 30; 20 MG/100ML; MG/100ML; MG/100ML; MG/100ML
INJECTION, SOLUTION INTRAVENOUS CONTINUOUS
Status: DISCONTINUED | OUTPATIENT
Start: 2019-04-04 | End: 2019-04-04 | Stop reason: HOSPADM

## 2019-04-04 RX ORDER — LIDOCAINE HYDROCHLORIDE 20 MG/ML
INJECTION, SOLUTION INFILTRATION; PERINEURAL PRN
Status: DISCONTINUED | OUTPATIENT
Start: 2019-04-04 | End: 2019-04-04

## 2019-04-04 RX ORDER — PROPOFOL 10 MG/ML
INJECTION, EMULSION INTRAVENOUS PRN
Status: DISCONTINUED | OUTPATIENT
Start: 2019-04-04 | End: 2019-04-04

## 2019-04-04 RX ORDER — MEPERIDINE HYDROCHLORIDE 25 MG/ML
12.5 INJECTION INTRAMUSCULAR; INTRAVENOUS; SUBCUTANEOUS
Status: DISCONTINUED | OUTPATIENT
Start: 2019-04-04 | End: 2019-04-05 | Stop reason: HOSPADM

## 2019-04-04 RX ORDER — FENTANYL CITRATE 50 UG/ML
INJECTION, SOLUTION INTRAMUSCULAR; INTRAVENOUS PRN
Status: DISCONTINUED | OUTPATIENT
Start: 2019-04-04 | End: 2019-04-04

## 2019-04-04 RX ORDER — ACETAMINOPHEN 325 MG/1
975 TABLET ORAL ONCE
Status: COMPLETED | OUTPATIENT
Start: 2019-04-04 | End: 2019-04-04

## 2019-04-04 RX ORDER — BACITRACIN 500 [USP'U]/G
0.5 OINTMENT OPHTHALMIC 3 TIMES DAILY
Qty: 3.5 G | Refills: 0 | Status: SHIPPED | OUTPATIENT
Start: 2019-04-04 | End: 2019-04-11

## 2019-04-04 RX ORDER — DEXAMETHASONE SODIUM PHOSPHATE 4 MG/ML
INJECTION, SOLUTION INTRA-ARTICULAR; INTRALESIONAL; INTRAMUSCULAR; INTRAVENOUS; SOFT TISSUE PRN
Status: DISCONTINUED | OUTPATIENT
Start: 2019-04-04 | End: 2019-04-04

## 2019-04-04 RX ADMIN — SODIUM CHLORIDE, SODIUM LACTATE, POTASSIUM CHLORIDE, CALCIUM CHLORIDE: 600; 310; 30; 20 INJECTION, SOLUTION INTRAVENOUS at 11:07

## 2019-04-04 RX ADMIN — ACETAMINOPHEN 975 MG: 325 TABLET ORAL at 11:07

## 2019-04-04 RX ADMIN — PROPOFOL 50 MG: 10 INJECTION, EMULSION INTRAVENOUS at 12:44

## 2019-04-04 RX ADMIN — LIDOCAINE HYDROCHLORIDE 100 MG: 20 INJECTION, SOLUTION INFILTRATION; PERINEURAL at 12:44

## 2019-04-04 RX ADMIN — ONDANSETRON 4 MG: 2 INJECTION INTRAMUSCULAR; INTRAVENOUS at 12:49

## 2019-04-04 RX ADMIN — FENTANYL CITRATE 50 MCG: 50 INJECTION, SOLUTION INTRAMUSCULAR; INTRAVENOUS at 12:40

## 2019-04-04 RX ADMIN — DEXAMETHASONE SODIUM PHOSPHATE 4 MG: 4 INJECTION, SOLUTION INTRA-ARTICULAR; INTRALESIONAL; INTRAMUSCULAR; INTRAVENOUS; SOFT TISSUE at 12:49

## 2019-04-04 ASSESSMENT — MIFFLIN-ST. JEOR: SCORE: 2021.78

## 2019-04-04 NOTE — OP NOTE
PREOPERATIVE DIAGNOSIS:  Left scalp pain and polymyalgia rheumatica, rule out temporal arteritis.      POSTOPERATIVE DIAGNOSIS:  Left scalp pain and polymyalgia rheumatica, rule out temporal arteritis.      PROCEDURE:  Left temporal artery biopsy.      SURGEON:  Bette Curry MD     ASSISTANTS:  Louis Bianchi MD     ANESTHESIA:  Monitored with local infiltration of a 50/50 mixture of 2% lidocaine with epinephrine and 0.5% Marcaine.      COMPLICATIONS:  None.      ESTIMATED BLOOD LOSS:  Less than 5 mL.     SPECIMEN: Temporal artery in formalin.     HISTORY OF PRESENT ILLNESS:  yC Seymour  presented with scalp pain and a diagnosis of polymyalgia rheumatica, thus was referred for a temporal artery biopsy to rule out temporal arteritis.  After the risks, benefits and alternatives were explained, informed consent was obtained.      DESCRIPTION OF PROCEDURE:  The patient was brought to the operating room and placed supine on the operating table.  IV sedation was given.  The temporal artery was palpated on the left side, a marking made extending from the ear superotemporally over the artery. Due to prior procedures in the area, there was scaring. A branch of the artery a bit more anterior was palpable and marked out. The area was infiltrated with local anesthetic and the area was prepped and draped in the typical sterile fashion for oculoplastic surgery.  Attention was directed to the left side.  An incision was made with a 15 blade through the skin.  Subcutaneous tissue was dissected with the Weeks scissors. The artery was identified.  I dissected out the artery and  tagged the proximal and distal ends with 4-0 silk suture. The artery was cut with the Marcos scissors.  Hemostasis was obtained by tying off the vessel with the silk.  The incision was closed with interrupted buried 5-0 Vicryl sutures deep and 6-0 plain gut sutures on the skin edges. Antibiotic ointment was applied.    The specimen was  placed in formalin and sent for pathologic evaluation.  A 1.7-cm in situ biopsy was obtained.  The patient tolerated the procedure well and was taken to recovery room in stable condition.       Bette Curry MD

## 2019-04-04 NOTE — BRIEF OP NOTE
[facility] Brief Operative Note    Pre-operative diagnosis: Rule Out Temporal Arteries, Polymyalgia Rheumatica   Post-operative diagnosis Same as preop   Procedure: Procedure(s):  Left Temporal Artery Biopsy   Surgeon(s): Surgeon(s) and Role:     * Bette Curry MD - Primary     * Louis Hamilton MD - Resident - Assisting     * Binta Altman MD - Fellow - Assisting   Estimated blood loss: 2 mL    Specimens: ID Type Source Tests Collected by Time Destination   A : Left Temporal Artery Biopsy Artery, Temporal SURGICAL PATHOLOGY EXAM Bette Curry MD 4/4/2019 12:53 PM       Findings: See full dictation

## 2019-04-04 NOTE — ANESTHESIA POSTPROCEDURE EVALUATION
Anesthesia POST Procedure Evaluation    Patient: Cy Seymour   MRN:     [id number] Gender:   male   Age:    73 year old :      1945        Preoperative Diagnosis: Rule Out Temporal Arteries, Polymyalgia Rheumatica   Procedure(s):  Left Temporal Artery Biopsy   Postop Comments: No value filed.       Anesthesia Type:  General    Reportable Event: NO     PAIN: Uncomplicated   Sign Out status: Comfortable, Well controlled pain     PONV: No PONV   Sign Out status:  No Nausea or Vomiting     Neuro/Psych: Uneventful perioperative course   Sign Out Status: Preoperative baseline; Age appropriate mentation     Airway/Resp.: Uneventful perioperative course   Sign Out Status: Non labored breathing, age appropriate RR; Resp. Status within EXPECTED Parameters     CV: Uneventful perioperative course   Sign Out status: Appropriate BP and perfusion indices; Appropriate HR/Rhythm     Disposition:   Sign Out in:  PACU  Disposition:  Phase II; Home  Recovery Course: Uneventful  Follow-Up: Not required           Last Anesthesia Record Vitals:  CRNA VITALS  2019 1255 - 2019 1355      2019             Pulse:  74    SpO2:  94 %    Resp Rate (set):  10          Last PACU/Preop Vitals:  Vitals:    19 1123 19 1327 19 1342   BP: (!) 180/100 (!) 145/96 130/87   Resp:  14 14   Temp:  36.7  C (98  F) 36.7  C (98  F)   SpO2:  94% 94%         Electronically Signed By: Jeramie Martines MD, MD, 2019, 1:58 PM

## 2019-04-04 NOTE — BRIEF OP NOTE
[facility] Brief Operative Note    Pre-operative diagnosis: Rule Out Temporal arteritis, Polymyalgia Rheumatica   Post-operative diagnosis Same   Procedure: Procedure(s):  Left Temporal Artery Biopsy   Surgeon(s): Surgeon(s) and Role:     * Bette Curry MD - Primary     * Louis Hamilton MD - Resident - Assisting     * Binta Altman MD - Fellow - Assisting   Estimated blood loss: <10mL    Specimens: ID Type Source Tests Collected by Time Destination   A : Left Temporal Artery Biopsy Artery, Temporal SURGICAL PATHOLOGY EXAM Bette Curry MD 4/4/2019 12:53 PM       Findings: As expected    Louis Hamilton MD - PGY 2  Ophthalmology resident

## 2019-04-04 NOTE — ANESTHESIA CARE TRANSFER NOTE
[facility]    Procedure(s):  Left Temporal Artery Biopsy    Diagnosis: Rule Out Temporal Arteries, Polymyalgia Rheumatica  Diagnosis Additional Information: No value filed.    Anesthesia Type:   MAC     Note:  Airway :Room Air  Patient transferred to:Phase II  Comments: VSS and WNL, comfortable, no PONV, report to Edil RNHandoff Report: Identifed the Patient, Identified the Reponsible Provider, Reviewed the pertinent medical history, Discussed the surgical course, Reviewed Intra-OP anesthesia mangement and issues during anesthesia, Set expectations for post-procedure period and Allowed opportunity for questions and acknowledgement of understanding      Vitals: (Last set prior to Anesthesia Care Transfer)    CRNA VITALS  4/4/2019 1255 - 4/4/2019 1330      4/4/2019             Pulse:  74    SpO2:  94 %    Resp Rate (set):  10                Electronically Signed By: ANDRZEJ Horan CRNA  April 4, 2019  1:30 PM

## 2019-04-04 NOTE — DISCHARGE INSTRUCTIONS
Post-operative Instructions    Ophthalmic Plastic and Reconstructive Surgery  Bette Curry M.D.    All instructions apply to the operated eye(s) or eyelid(s)      What to expect after surgery:    Thre will be some swelling, bruising, and likely a black eye (even into the lower eyelids and cheeks). Also expect crusting and discharge from the eye and/or incisions.     A small amount of surface bleeding is normal for the first 48 hours after surgery.    You may notice some bloody tears for the first few days after surgery. This is normal.    Your eye(s) and eyelid(s) may be painful and tender. This is normal after surgery. Use the pain medication as prescribed. If your pain does not improve despite the medication, contact the office.    Wound care and personal care:    If a patch or bandage has been placed, please leave this in place until seen in clinic. Prevent the bandage from getting wet.     Apply ice compresses 15 minutes on 15 minutes off while awake for the first 2 days after surgery, then switch to warm compresses 4 times a day until seen by your physician.     For warm packs you can place a cup of dry uncooked rice in a clean cotton sock. Place sock in microwave 30 seconds to one minute. Next place the warm sock into a plastic bag and wrap the bag with clean warm wet washcloth and place over operated eye.      You may shower or wash your hair the day after surgery. Do not bathe or go swimming for 1 week to prevent contamination of your wounds.    Do not apply make-up to the eyes or eyelids for 2 weeks after surgery.      Activity restrictions and driving:    Avoid heavy lifting, bending, exercise or strenuous activity for 1 week after surgery.    You may resume other activities and return to work as tolerated.    You may not resume driving until have you stopped using narcotic pain medications(such as Norco, Percocet, Tylenol #3).    Medications:    Restart all your regular home medications and eye drops  today. If you take Plavix or Aspirin on a regular basis, wait for 3 days after your surgery before restarting these in order to decrease the risk of bleeding complications.    Avoid aspirin and aspirin-like medications (Motrin, Aleve, Ibuprofen, Mar-[place] etc) for 5 days to reduce the risk of bleeding. You may take Tylenol (acetaminophen) for pain.    In addition to your home medications, take the following post-operative medications as prescribed by your physician:    Apply antibiotic ointment (bacitracin) to all sutures three times a day.    Take 1 to 2 pain pills (tylenol) as needed for pain up to every 4 hours.      WARNING: You must not take more than 4,000 mg of acetaminophen per 24-hour period. This is equivalent to 6 tablets of Darvocet, 8 tablets of Vicodin, or 12 tablets of Norco, Percocet or Tylenol #3. If you take other over-the-counter medications containing acetaminophen, you must take the amount of acetaminophen into account and reduce the number of prescribed pain pills accordingly.    Contact information and follow-up:    Return to the Eye Clinic for a follow-up appointment with your physician as  scheduled. If no appointment has been scheduled, call [phone] for an  appointment with Dr. Curry within 1 to 2 weeks from your date of surgery.      For severe pain, bleeding, or loss of vision, call the Eye Clinic at [phone].    After hours or on weekends and holidays, call [phone] and ask to speak with the ophthalmologist on call.    [facility]  Home Care Following Anesthesia  For 24 hours after surgery:  1. Get plenty of rest.  A responsible adult must stay with you for at least 24 hours after you leave the surgery center.  2. Do not drive or use heavy equipment.  If you have weakness or tingling, don't drive or use heavy equipment until this feeling goes away.   3. Do not drink alcohol.   4. Avoid strenuous or risky activities.  Ask for  help when climbing stairs.  5. You may feel lightheaded.  IF so, sit for a few minutes before standing.  Have someone help you get up.   6. If you have nausea (feel sick to your stomach): Drink only clear liquids such as apple juice, ginger ale, broth or 7-Up.  Rest may also help.  Be sure to drink enough fluids.  Move to a regular diet as you feel able.   7. You may have a slight fever.  Call the doctor if your fever is over 100 F (37.7 C) (taken under the tongue) or lasts longer than 24 hours.  8. You may have a dry mouth, a sore throat, muscle aches or trouble sleeping. These should go away after 24 hours.  9. Do not make important or legal decisions.       Tips for taking pain medications  To get the best pain relief possible, remember these points:    Take pain medications as directed, before pain becomes severe.    Pain medication can upset your stomach: taking it with food may help.    Constipation is a common side effect of pain medication. Drink plenty of  fluids.    Eat foods high in fiber. Take a stool softener if recommended by your doctor or pharmacist.    Do not drink alcohol, drive or operate machinery while taking pain medications.    Ask about other ways to control pain, such as with heat, ice or relaxation.    Tylenol/Acetaminophen Consumption  To help encourage the safe use of acetaminophen, the makers of TYLENOL  have lowered the maximum daily dose for single-ingredient Extra Strength TYLENOL  (acetaminophen) products sold in the [place] from 8 pills per day (4,000 mg) to 6 pills per day (3,000 mg). The dosing interval has also changed from 2 pills every 4-6 hours to 2 pills every 6 hours.    If you feel your pain relief is insufficient, you may take Tylenol/Acetaminophen in addition to your narcotic pain medication.     Be careful not to exceed 3,000 mg of Tylenol/Acetaminophen in a 24 hour period from all sources.    If you are taking extra strength Tylenol/acetaminophen (500 mg), the maximum dose  is 6 tablets in 24 hours.    If you are taking regular strength acetaminophen (325 mg), the maximum dose is 9 tablets in 24 hours.    **975 mg Tylenol given at 11:07, can take again at 05:07 PM    Call a doctor for any of the followin. Signs of infection (fever, growing tenderness at the surgery site, a large amount of drainage or bleeding, severe pain, foul-smelling drainage, redness, swelling).  2. It has been over 8 to 10 hours since surgery and you are still not able to urinate (pass water).  3. Headache for over 24 hours.  Your doctor is:       Dr. Bette Curry, Ophthalmology: [phone]               Or dial [phone] and ask for the resident on call for:  Ophthalmology  For emergency care, call the:  [facility]:  [phone] (TTY for hearing impaired: [phone])

## 2019-04-05 LAB — COPATH REPORT: NORMAL

## 2019-04-05 NOTE — PROGRESS NOTES
"UROLOGIC DIAGNOSES:     CURRENT INTERVENTIONS:       HISTORY:     Pt is a very pleasant 73 yo white male who presents to office today complaining of enlarged prostate and interested in MRI fusion biopsy. He states he has been followed by another urologist in the past and was diagnosed with an enlarged prostate. He reports his PSA has been monitored over past several years and has ranged 3-8. He also says he has undergone 1-2 MRIs of prostate in past with positive lesions but these results are unavailable at this time. He was considering a \"Urolift\" procedure but was told he should undergo prostate biospy prior to this intervention to rule out prostate cancer. He was scheduled for biopsy at his previous urologist but opted to pursue MRI fusion biopsy with another provider.    His symptoms include nocturia, on average 5 times per night but as severe as 10 times per night. He also c/o of slow stream, starting and stopping, urgency with rare instances of leakage and incontinence. He does not wear pads/diapers. He has been prescribed medication for BPH in the past but has never taken anything.    Denies history of UTIs, kidney infections, or kidney stones.He reports a positive family hx of prostate cancer in father ( of CHF at 88 yo; tx unknown) and brother (living at 78 yo; tx with radiation).      Patient s/p prostate biopsy complicated by UTI with fever. Patient also with orchitis with reactive hydrocele. U/s today showed reactive hydrocele but no abscess.     Patient states today that his urinary symptoms are tolerable. Did not have UDS, did not schedule for TURP. Not taking alfuzosin.     Had MRI prostate that did not show any suspicious lesions.   PSA was 10.8, 9.3 previously.     PSA today was 2.3    We discussed PSA result, MRI results, alpha blocker use, lower urinary tract symptoms.       PAST MEDICAL HISTORY:   Past Medical History:   Diagnosis Date     ADHD (attention deficit hyperactivity disorder)  "     Aneurysm artery, iliac (H)     father had AAA surgery     Anxiety      Atrial fibrillation (H) 5/9/2011     Bicuspid aortic valve      Coronary artery disease     ablation 2011     Depressive disorder      Diverticulitis      Gastro-oesophageal reflux disease      Hearing loss      History of spinal cord injury      Hypertension      Other chronic pain      Polymyalgia rheumatica (H)      Prostate infection      Restless legs      S/P nasal septoplasty     1998     Seasonal affective disorder (H)      Sleep apnea      Tachycardia     exercise initiated       PAST SURGICAL HISTORY:   Past Surgical History:   Procedure Laterality Date     BACK SURGERY      cervical fusion 2005     BLEPHAROPLASTY BILATERAL  2/27/2012    Procedure:BLEPHAROPLASTY BILATERAL; BILATERAL UPPER LID BLEPHAROPLASTY ; Surgeon:JESSE LINDSEY; Location:[facility]     CARDIAC SURGERY      stent 2003     CYSTOSCOPY       ORTHOPEDIC SURGERY      bilat hip replaced 2008,2009     PROSTATE SURGERY         FAMILY HISTORY:   Family History   Problem Relation Age of Onset     Arthritis Mother      Hypertension Mother      Hypertension Father      Heart Failure Father      Heart Disease Maternal Grandfather      Heart Disease Paternal Grandmother        SOCIAL HISTORY:   Social History     Tobacco Use     Smoking status: Never Smoker     Smokeless tobacco: Never Used   Substance Use Topics     Alcohol use: Yes     Comment: 1-2 alcoholic beverages per month       Current Outpatient Medications   Medication     Amphetamine-Dextroamphetamine (ADDERALL PO)     carvedilol (COREG) 6.25 MG tablet     gabapentin (NEURONTIN) 800 MG tablet     predniSONE (DELTASONE) 20 MG tablet     bacitracin 500 UNIT/GM ophthalmic ointment     sildenafil (VIAGRA) 100 MG cap/tab     No current facility-administered medications for this visit.      Facility-Administered Medications Ordered in Other Visits   Medication     lactated ringers infusion     meperidine (DEMEROL) injection  "12.5 mg     naloxone (NARCAN) injection 0.1-0.4 mg     ondansetron (ZOFRAN-ODT) ODT tab 4 mg    Or     ondansetron (ZOFRAN) injection 4 mg     ORAL Pain Medications -  may administer as ordered by surgeon for take home use     prochlorperazine (COMPAZINE) injection 5 mg         PHYSICAL EXAM:    /74 (BP Location: Left arm)   Pulse 68   Ht 1.88 m (6' 2\")   Wt 120.2 kg (265 lb)   BMI 34.02 kg/m      HEENT: Normocephalic and atraumatic   Cardiac: Not done  Back/Flank: Not done  CNS/PNS: Not done  Respiratory: Normal non-labored breathing  Abdomen: Soft nontender and nondistended  Peripheral Vascular: Not done  Mental Status: Not done    Penis: Not done  Scrotal Skin: Not done  Testicles: Not done  Epididymis: Not done  Digital Rectal Exam:     Cystoscopy: Not done    Imaging: None    Urinalysis: UA RESULTS:  Recent Labs   Lab Test  09/19/12   0959   COLOR  Yellow   APPEARANCE  Clear   URINEGLC  Negative   URINEBILI  Negative   URINEKETONE  Negative   SG  1.016   UBLD  Negative   URINEPH  5.0   PROTEIN  Negative   NITRITE  Negative   LEUKEST  Small*   RBCU  0   WBCU  2       PSA:     Post Void Residual:     Other labs: None today      IMPRESSION:  74 y/o M with lower urinary tract symptoms and grade group 1 prostate cancer     PLAN:  Continue alpha blocker   Repeat PSA in six months   MRI prostate in six months   Follow up in six months         Total Time: 15 minutes                                    Total in Consultation: greater than 50%     "

## 2019-04-28 NOTE — TELEPHONE ENCOUNTER
Note to Dr. Cast's facilitator for assistance scheduling  Rj Tinsley RN 10:02 AM 04/02/19           [facility]    Phone Message    May a detailed message be left on voicemail: yes    Reason for Call: Other: Pt is being referred by Dr Yun Drake for a Lt temporal biopsy,  Pt has history of PMR.  Please have staff call Pt to provide instructions for preparation for biopsy and appt.  Dr Drake's staff state Pt needs to have this biopsy ASAP.  Referral being faxed by staff today.     Action Taken: Message routed to:  [facility] clinic    
[phone]

## 2019-09-30 ENCOUNTER — ANCILLARY PROCEDURE (OUTPATIENT)
Dept: MRI IMAGING | Facility: CLINIC | Age: 74
End: 2019-09-30
Attending: UROLOGY
Payer: MEDICARE

## 2019-09-30 DIAGNOSIS — C61 PROSTATE CANCER (H): ICD-10-CM

## 2019-09-30 RX ORDER — GADOBUTROL 604.72 MG/ML
10 INJECTION INTRAVENOUS ONCE
Status: COMPLETED | OUTPATIENT
Start: 2019-09-30 | End: 2019-09-30

## 2019-09-30 RX ADMIN — GADOBUTROL 10 ML: 604.72 INJECTION INTRAVENOUS at 15:30

## 2019-10-06 ENCOUNTER — TRANSFERRED RECORDS (OUTPATIENT)
Dept: HEALTH INFORMATION MANAGEMENT | Facility: CLINIC | Age: 74
End: 2019-10-06

## 2019-10-21 DIAGNOSIS — C61 PROSTATE CANCER (H): Primary | ICD-10-CM

## 2019-10-21 DIAGNOSIS — R35.1 NOCTURIA: ICD-10-CM

## 2019-10-22 ENCOUNTER — OFFICE VISIT (OUTPATIENT)
Dept: UROLOGY | Facility: CLINIC | Age: 74
End: 2019-10-22
Payer: MEDICARE

## 2019-10-22 VITALS
DIASTOLIC BLOOD PRESSURE: 80 MMHG | OXYGEN SATURATION: 96 % | WEIGHT: 239 LBS | HEART RATE: 64 BPM | HEIGHT: 74 IN | BODY MASS INDEX: 30.67 KG/M2 | SYSTOLIC BLOOD PRESSURE: 118 MMHG

## 2019-10-22 DIAGNOSIS — C61 PROSTATE CANCER (H): ICD-10-CM

## 2019-10-22 DIAGNOSIS — R35.1 NOCTURIA: ICD-10-CM

## 2019-10-22 LAB
PSA SERPL-MCNC: 4 NG/ML (ref 0–4)
RESIDUAL VOLUME (RV) (EXTERNAL): 1

## 2019-10-22 PROCEDURE — 84153 ASSAY OF PSA TOTAL: CPT | Performed by: UROLOGY

## 2019-10-22 PROCEDURE — 51798 US URINE CAPACITY MEASURE: CPT | Performed by: UROLOGY

## 2019-10-22 PROCEDURE — 99213 OFFICE O/P EST LOW 20 MIN: CPT | Mod: 25 | Performed by: UROLOGY

## 2019-10-22 PROCEDURE — 36415 COLL VENOUS BLD VENIPUNCTURE: CPT | Performed by: UROLOGY

## 2019-10-22 RX ORDER — LEFLUNOMIDE 20 MG/1
10 TABLET ORAL
Refills: 0 | COMMUNITY
Start: 2019-09-11

## 2019-10-22 RX ORDER — PREDNISONE 5 MG/1
TABLET ORAL
Refills: 1 | COMMUNITY
Start: 2019-09-03 | End: 2019-12-04

## 2019-10-22 RX ORDER — CARVEDILOL 3.12 MG/1
TABLET ORAL
Refills: 3 | COMMUNITY
Start: 2019-07-22

## 2019-10-22 RX ORDER — TRAZODONE HYDROCHLORIDE 100 MG/1
50 TABLET ORAL EVERY EVENING
COMMUNITY
Start: 2019-05-12

## 2019-10-22 RX ORDER — GABAPENTIN 300 MG/1
900 CAPSULE ORAL AT BEDTIME
Refills: 1 | COMMUNITY
Start: 2019-09-30

## 2019-10-22 ASSESSMENT — MIFFLIN-ST. JEOR: SCORE: 1898.85

## 2019-10-22 ASSESSMENT — PAIN SCALES - GENERAL: PAINLEVEL: NO PAIN (0)

## 2019-10-22 NOTE — PROGRESS NOTES
"UROLOGIC DIAGNOSES:     CURRENT INTERVENTIONS:       HISTORY:     Pt is a very pleasant 71 yo white male who presents to office today complaining of enlarged prostate and interested in MRI fusion biopsy. He states he has been followed by another urologist in the past and was diagnosed with an enlarged prostate. He reports his PSA has been monitored over past several years and has ranged 3-8. He also says he has undergone 1-2 MRIs of prostate in past with positive lesions but these results are unavailable at this time. He was considering a \"Urolift\" procedure but was told he should undergo prostate biospy prior to this intervention to rule out prostate cancer. He was scheduled for biopsy at his previous urologist but opted to pursue MRI fusion biopsy with another provider.    Found to have lesli 3+3 prostate cancer on biopsy in 2017.     His symptoms include nocturia, on average 5 times per night but as severe as 10 times per night. He also c/o of slow stream, starting and stopping, urgency with rare instances of leakage and incontinence. He does not wear pads/diapers. He has been prescribed medication for BPH in the past but has never taken anything.    Denies history of UTIs, kidney infections, or kidney stones.He reports a positive family hx of prostate cancer in father ( of CHF at 90 yo; tx unknown) and brother (living at 76 yo; tx with radiation).      Patient s/p prostate biopsy complicated by UTI with fever. Patient also with orchitis with reactive hydrocele. U/s today showed reactive hydrocele but no abscess.     Patient states today that his urinary symptoms are tolerable. Did not have UDS, did not schedule for TURP. Not taking alfuzosin.     Had MRI prostate in 2019 that did not show any suspicious lesions.   PSA was 10.8, 9.3 previously.     PSA today was 4.0 (previous visit was 2.3).     We discussed PSA result, MRI results, alpha blocker use, lower urinary tract symptoms.       PAST " MEDICAL HISTORY:   Past Medical History:   Diagnosis Date     ADHD (attention deficit hyperactivity disorder)      Aneurysm artery, iliac (H)     father had AAA surgery     Anxiety      Atrial fibrillation (H) 5/9/2011     Bicuspid aortic valve      Coronary artery disease     ablation 2011     Depressive disorder      Diverticulitis      Gastro-oesophageal reflux disease      Hearing loss      History of spinal cord injury      Hypertension      Other chronic pain      Polymyalgia rheumatica (H)      Prostate infection      Restless legs      S/P nasal septoplasty     1998     Seasonal affective disorder (H)      Sleep apnea      Tachycardia     exercise initiated       PAST SURGICAL HISTORY:   Past Surgical History:   Procedure Laterality Date     BACK SURGERY      cervical fusion 2005     BIOPSY ARTERY TEMPORAL Left 4/4/2019    Procedure: Left Temporal Artery Biopsy;  Surgeon: Bette Curry MD;  Location: [facility] OR     BLEPHAROPLASTY BILATERAL  2/27/2012    Procedure:BLEPHAROPLASTY BILATERAL; BILATERAL UPPER LID BLEPHAROPLASTY ; Surgeon:JESSE LINDSEY; Location:[facility]     CARDIAC SURGERY      stent 2003     CYSTOSCOPY       ORTHOPEDIC SURGERY      bilat hip replaced 2008,2009     PROSTATE SURGERY         FAMILY HISTORY:   Family History   Problem Relation Age of Onset     Arthritis Mother      Hypertension Mother      Hypertension Father      Heart Failure Father      Heart Disease Maternal Grandfather      Heart Disease Paternal Grandmother        SOCIAL HISTORY:   Social History     Tobacco Use     Smoking status: Never Smoker     Smokeless tobacco: Never Used   Substance Use Topics     Alcohol use: Yes     Comment: 1-2 alcoholic beverages per month       Current Outpatient Medications   Medication     Amphetamine-Dextroamphetamine (ADDERALL PO)     carvedilol (COREG) 3.125 MG tablet     gabapentin (NEURONTIN) 300 MG capsule     leflunomide (ARAVA) 20 MG tablet     predniSONE (DELTASONE) 5 MG tablet      "traZODone (DESYREL) 100 MG tablet     carvedilol (COREG) 6.25 MG tablet     gabapentin (NEURONTIN) 800 MG tablet     predniSONE (DELTASONE) 20 MG tablet     sildenafil (VIAGRA) 100 MG cap/tab     No current facility-administered medications for this visit.          PHYSICAL EXAM:    /80 (BP Location: Right arm, Patient Position: Sitting, Cuff Size: Adult Regular)   Pulse 64   Ht 1.88 m (6' 2\")   Wt 108.4 kg (239 lb)   SpO2 96%   BMI 30.69 kg/m      HEENT: Normocephalic and atraumatic   Cardiac: Not done  Back/Flank: Not done  CNS/PNS: Not done  Respiratory: Normal non-labored breathing  Abdomen: Soft nontender and nondistended  Peripheral Vascular: Not done  Mental Status: Not done    Penis: Not done  Scrotal Skin: Not done  Testicles: Not done  Epididymis: Not done  Digital Rectal Exam:     Cystoscopy: Not done    Imaging: None    Urinalysis: UA RESULTS:  Recent Labs   Lab Test  09/19/12   0959   COLOR  Yellow   APPEARANCE  Clear   URINEGLC  Negative   URINEBILI  Negative   URINEKETONE  Negative   SG  1.016   UBLD  Negative   URINEPH  5.0   PROTEIN  Negative   NITRITE  Negative   LEUKEST  Small*   RBCU  0   WBCU  2       PSA:     Post Void Residual:     Other labs: None today      IMPRESSION:  73 y/o M with lower urinary tract symptoms and grade group 1 prostate cancer     PLAN:  Continue alpha blocker   Will consider repeat biopsy in the future   Repeat PSA in six months   Follow up in six months         Total Time: 15 minutes                                    Total in Consultation: greater than 50%     "

## 2019-10-22 NOTE — LETTER
"10/22/2019       RE: Cy Seymour  [address]   [facility] [address]     Dear Colleague,    Thank you for referring your patient, Cy Seymour, to the [facility] [place] at [facility]. Please see a copy of my visit note below.    UROLOGIC DIAGNOSES:     CURRENT INTERVENTIONS:       HISTORY:     Pt is a very pleasant 71 yo white male who presents to office today complaining of enlarged prostate and interested in MRI fusion biopsy. He states he has been followed by another urologist in the past and was diagnosed with an enlarged prostate. He reports his PSA has been monitored over past several years and has ranged 3-8. He also says he has undergone 1-2 MRIs of prostate in past with positive lesions but these results are unavailable at this time. He was considering a \"Urolift\" procedure but was told he should undergo prostate biospy prior to this intervention to rule out prostate cancer. He was scheduled for biopsy at his previous urologist but opted to pursue MRI fusion biopsy with another provider.    Found to have lesli 3+3 prostate cancer on biopsy in 2017.     His symptoms include nocturia, on average 5 times per night but as severe as 10 times per night. He also c/o of slow stream, starting and stopping, urgency with rare instances of leakage and incontinence. He does not wear pads/diapers. He has been prescribed medication for BPH in the past but has never taken anything.    Denies history of UTIs, kidney infections, or kidney stones.He reports a positive family hx of prostate cancer in father ( of CHF at 90 yo; tx unknown) and brother (living at 76 yo; tx with radiation).      Patient s/p prostate biopsy complicated by UTI with fever. Patient also with orchitis with reactive hydrocele. U/s today showed reactive hydrocele but no abscess.     Patient states today that his urinary symptoms are tolerable. " Did not have UDS, did not schedule for TURP. Not taking alfuzosin.     Had MRI prostate in 09/2019 that did not show any suspicious lesions.   PSA was 10.8, 9.3 previously.     PSA today was 4.0 (previous visit was 2.3).     We discussed PSA result, MRI results, alpha blocker use, lower urinary tract symptoms.       PAST MEDICAL HISTORY:   Past Medical History:   Diagnosis Date     ADHD (attention deficit hyperactivity disorder)      Aneurysm artery, iliac (H)     father had AAA surgery     Anxiety      Atrial fibrillation (H) 5/9/2011     Bicuspid aortic valve      Coronary artery disease     ablation 2011     Depressive disorder      Diverticulitis      Gastro-oesophageal reflux disease      Hearing loss      History of spinal cord injury      Hypertension      Other chronic pain      Polymyalgia rheumatica (H)      Prostate infection      Restless legs      S/P nasal septoplasty     1998     Seasonal affective disorder (H)      Sleep apnea      Tachycardia     exercise initiated       PAST SURGICAL HISTORY:   Past Surgical History:   Procedure Laterality Date     BACK SURGERY      cervical fusion 2005     BIOPSY ARTERY TEMPORAL Left 4/4/2019    Procedure: Left Temporal Artery Biopsy;  Surgeon: Bette Curry MD;  Location: [facility]     BLEPHAROPLASTY BILATERAL  2/27/2012    Procedure:BLEPHAROPLASTY BILATERAL; BILATERAL UPPER LID BLEPHAROPLASTY ; Surgeon:JESSE LINDSEY; Location:[facility]     CARDIAC SURGERY      stent 2003     CYSTOSCOPY       ORTHOPEDIC SURGERY      bilat hip replaced 2008,2009     PROSTATE SURGERY         FAMILY HISTORY:   Family History   Problem Relation Age of Onset     Arthritis Mother      Hypertension Mother      Hypertension Father      Heart Failure Father      Heart Disease Maternal Grandfather      Heart Disease Paternal Grandmother        SOCIAL HISTORY:   Social History     Tobacco Use     Smoking status: Never Smoker     Smokeless tobacco: Never Used   Substance Use Topics      "Alcohol use: Yes     Comment: 1-2 alcoholic beverages per month       Current Outpatient Medications   Medication     Amphetamine-Dextroamphetamine (ADDERALL PO)     carvedilol (COREG) 3.125 MG tablet     gabapentin (NEURONTIN) 300 MG capsule     leflunomide (ARAVA) 20 MG tablet     predniSONE (DELTASONE) 5 MG tablet     traZODone (DESYREL) 100 MG tablet     carvedilol (COREG) 6.25 MG tablet     gabapentin (NEURONTIN) 800 MG tablet     predniSONE (DELTASONE) 20 MG tablet     sildenafil (VIAGRA) 100 MG cap/tab     No current facility-administered medications for this visit.          PHYSICAL EXAM:    /80 (BP Location: Right arm, Patient Position: Sitting, Cuff Size: Adult Regular)   Pulse 64   Ht 1.88 m (6' 2\")   Wt 108.4 kg (239 lb)   SpO2 96%   BMI 30.69 kg/m       HEENT: Normocephalic and atraumatic   Cardiac: Not done  Back/Flank: Not done  CNS/PNS: Not done  Respiratory: Normal non-labored breathing  Abdomen: Soft nontender and nondistended  Peripheral Vascular: Not done  Mental Status: Not done    Penis: Not done  Scrotal Skin: Not done  Testicles: Not done  Epididymis: Not done  Digital Rectal Exam:     Cystoscopy: Not done    Imaging: None    Urinalysis: UA RESULTS:  Recent Labs   Lab Test  09/19/12   0959   COLOR  Yellow   APPEARANCE  Clear   URINEGLC  Negative   URINEBILI  Negative   URINEKETONE  Negative   SG  1.016   UBLD  Negative   URINEPH  5.0   PROTEIN  Negative   NITRITE  Negative   LEUKEST  Small*   RBCU  0   WBCU  2     PSA:   Post Void Residual:   Other labs: None today    IMPRESSION:  73 y/o M with lower urinary tract symptoms and grade group 1 prostate cancer     PLAN:  Continue alpha blocker   Will consider repeat biopsy in the future   Repeat PSA in six months   Follow up in six months     Total Time: 15 minutes                                    Total in Consultation: greater than 50%     Again, thank you for allowing me to participate in the care of your patient.  "     Sincerely,  Zulay Lopez MD

## 2019-10-22 NOTE — NURSING NOTE
Chief Complaint   Patient presents with     Other     patient is here for MRI results, and 6 month SD PSA.      PVR today was 1ml.     Sophia Bravo CMA

## 2019-11-02 ENCOUNTER — HEALTH MAINTENANCE LETTER (OUTPATIENT)
Age: 74
End: 2019-11-02

## 2019-12-04 ENCOUNTER — OFFICE VISIT (OUTPATIENT)
Dept: CARDIOLOGY | Facility: CLINIC | Age: 74
End: 2019-12-04
Attending: INTERNAL MEDICINE
Payer: MEDICARE

## 2019-12-04 ENCOUNTER — PRE VISIT (OUTPATIENT)
Dept: CARDIOLOGY | Facility: CLINIC | Age: 74
End: 2019-12-04

## 2019-12-04 VITALS
OXYGEN SATURATION: 93 % | HEART RATE: 66 BPM | DIASTOLIC BLOOD PRESSURE: 88 MMHG | WEIGHT: 245 LBS | HEIGHT: 74 IN | SYSTOLIC BLOOD PRESSURE: 135 MMHG | BODY MASS INDEX: 31.44 KG/M2

## 2019-12-04 DIAGNOSIS — Z86.79 PERSONAL HISTORY OF OTHER DISEASES OF THE CIRCULATORY SYSTEM: ICD-10-CM

## 2019-12-04 DIAGNOSIS — Q23.81 BICUSPID AORTIC VALVE: ICD-10-CM

## 2019-12-04 DIAGNOSIS — R07.89 ATYPICAL CHEST PAIN: ICD-10-CM

## 2019-12-04 DIAGNOSIS — F40.240 CLAUSTROPHOBIA: ICD-10-CM

## 2019-12-04 DIAGNOSIS — I25.118 CORONARY ARTERY DISEASE INVOLVING NATIVE HEART WITH OTHER FORM OF ANGINA PECTORIS, UNSPECIFIED VESSEL OR LESION TYPE (H): Primary | ICD-10-CM

## 2019-12-04 DIAGNOSIS — I48.0 PAROXYSMAL ATRIAL FIBRILLATION (H): ICD-10-CM

## 2019-12-04 PROCEDURE — G0463 HOSPITAL OUTPT CLINIC VISIT: HCPCS | Mod: 25,ZF

## 2019-12-04 PROCEDURE — 93010 ELECTROCARDIOGRAM REPORT: CPT | Mod: ZP | Performed by: INTERNAL MEDICINE

## 2019-12-04 PROCEDURE — 93005 ELECTROCARDIOGRAM TRACING: CPT | Mod: ZF

## 2019-12-04 PROCEDURE — 99204 OFFICE O/P NEW MOD 45 MIN: CPT | Mod: ZP | Performed by: INTERNAL MEDICINE

## 2019-12-04 RX ORDER — DIAZEPAM 5 MG
5 TABLET ORAL
Qty: 1 TABLET | Refills: 0 | Status: ON HOLD | OUTPATIENT
Start: 2019-12-04 | End: 2020-04-17

## 2019-12-04 ASSESSMENT — ENCOUNTER SYMPTOMS
HEMATURIA: 0
WEAKNESS: 1
SLEEP DISTURBANCES DUE TO BREATHING: 0
NECK MASS: 0
LOSS OF CONSCIOUSNESS: 0
TREMORS: 0
WEIGHT GAIN: 1
NECK PAIN: 1
TASTE DISTURBANCE: 0
DISTURBANCES IN COORDINATION: 1
FEVER: 0
HEADACHES: 0
JOINT SWELLING: 0
DYSURIA: 1
NUMBNESS: 1
DECREASED APPETITE: 0
POLYPHAGIA: 0
ALTERED TEMPERATURE REGULATION: 1
HYPOTENSION: 0
SMELL DISTURBANCE: 1
HALLUCINATIONS: 0
HOARSE VOICE: 1
NAIL CHANGES: 0
TROUBLE SWALLOWING: 0
PALPITATIONS: 1
PARALYSIS: 0
STIFFNESS: 1
TINGLING: 1
NIGHT SWEATS: 1
ARTHRALGIAS: 1
INCREASED ENERGY: 1
FLANK PAIN: 0
SEIZURES: 0
LEG PAIN: 1
POLYDIPSIA: 0
FATIGUE: 1
HYPERTENSION: 0
SORE THROAT: 0
SINUS PAIN: 0
DIZZINESS: 1
SKIN CHANGES: 0
POOR WOUND HEALING: 0
MEMORY LOSS: 1
MUSCLE CRAMPS: 0
MYALGIAS: 1
BACK PAIN: 1
ORTHOPNEA: 1
MUSCLE WEAKNESS: 1
SINUS CONGESTION: 1
DIFFICULTY URINATING: 1
SYNCOPE: 0
LIGHT-HEADEDNESS: 1
WEIGHT LOSS: 0
CHILLS: 1
SPEECH CHANGE: 0
EXERCISE INTOLERANCE: 1

## 2019-12-04 ASSESSMENT — PAIN SCALES - GENERAL: PAINLEVEL: MODERATE PAIN (4)

## 2019-12-04 ASSESSMENT — MIFFLIN-ST. JEOR: SCORE: 1926.06

## 2019-12-04 NOTE — TELEPHONE ENCOUNTER
[facility]   DATE RECEIVED:12.4.19   NOTES STATUS DETAILS   OFFICE NOTE from referring provider    N/A    OFFICE NOTE from other cardiologist    Care Everywhere 7.22.19 [facility]   DISCHARGE SUMMARY from hospital    N/A    DISCHARGE REPORT from the ER   N/A    OPERATIVE REPORT    N/A    MEDICATION LIST   Care Everywhere    LABS     BMP   Care Everywhere 11.13.18   CBC   Care Everywhere 11.13.18   CMP   N/A    Lipids   N/A    TSH   Care Everywhere 11.13.18   DIAGNOSTIC PROCEDURES     EKG   Received 10.6.19 Requested strips NM  5.20.18 nm   Monitor Reports   N/A    IMAGING (DISC & REPORT)      Echo   Received 8.15.17 [facility]-requested  7.20.18 [facility]   Stress Tests   Received 11.22.16 scanned into [facility]    Received  Stress test from [facility] 8.6.18   Cath   N/A    MRI/MRA   N/A    CT/CTA   N/A      12.4.19 7:41 AM   Requested EKG strips from [facility]. Requested Stress test from [facility], US Aorta and echo from [facility]. Sending a  to get echo image.[phone].    12.4.19  11:10 AM  Received EKG from [facility]. Sent to Urgent [facility] to be uploaded into pt's chart. Also, placed EKG strips in provider's folder incase not uploaded to [facility] in time. LVM to have images pulled.    12.4.19 11:55 AM   Received EKG strips from [facility]. Sent to urgent [facility], just in case not uploaded in time sent email to supervisor, placed in provider's folder.    12.7.19  9:49 AM  Received CD from [facility] with echo 8.15.17. Gave to Grecia to be uploaded.

## 2019-12-04 NOTE — PROGRESS NOTES
Electrophysiology Clinic Note  HPI:   Mr. Seymour is a 74 year old male who has a past medical history significant for CAD s/p PCI RCA 2003 now  RCA, PCI LAD 2012, HTN, HLD, PAF (CHADSVASC 3) s/p PVI 8/2011, BAV with mild aortic stenosis, iliac artery aneurysm, GERD, polymyalgia rheumatica, IVANA (uses CPAP), RLS, and anxiety.     He has a cardiac history of angina and CAD for which he had PCI to RCA in 2003 and last heart cath in 2015 showed  of RCA unable to be opened. He also had prior PCI to LAD in 2012. Last NM stress test from 8/2018 showed small fixed defects with no significant ischemia. He was last seen by Cardiology at [facility] in 7/2019. He was restarted on low dose Crestor and baby ASA at that visit. He also has had PAF for which he ultimately underwent a PVI ablation in 8/2011 with Dr. Beal. He has not had any known recurrences of AF. He has a known BAV with echos showing mild/mod aortic stenosis.    He presents today to establish care. He states he has tried several statins but has not tolerated due to muscle aches. He was tried on low dose Crestor in 7/2019 but did not tolerate this. He reports over the last year he has felt more fatigued. He denies any known recurrences of AF. He does feel that his HR picks up quickly with activity. He also reports someconstant chest pressure associated with numbness/tightness in his neck and left arm. He endorses dizziness with change in position. He denies any worsening shortness of breath, leg/ankle swelling, PND, orthopnea, palpitations, or syncopal symptoms. Presenting 12 lead ECG shows SR with PVCs Vent Rate 68 bpm,  ms,  ms, QTc 452 ms.  Current cardiac medications include: Coreg, and ASA.       PAST MEDICAL HISTORY:  Past Medical History:   Diagnosis Date     ADHD (attention deficit hyperactivity disorder)      Aneurysm artery, iliac (H)     father had AAA surgery     Anxiety      Atrial fibrillation (H) 5/9/2011     Bicuspid aortic valve       Coronary artery disease     ablation 2011     Depressive disorder      Diverticulitis      Gastro-oesophageal reflux disease      Hearing loss      History of spinal cord injury      Hypertension      Other chronic pain      Polymyalgia rheumatica (H)      Prostate infection      Restless legs      S/P nasal septoplasty     1998     Seasonal affective disorder (H)      Sleep apnea      Tachycardia     exercise initiated       CURRENT MEDICATIONS:  Current Outpatient Medications   Medication Sig Dispense Refill     Amphetamine-Dextroamphetamine (ADDERALL PO) Take 10 mg by mouth        carvedilol (COREG) 3.125 MG tablet TK 1 T PO BID WITH MEALS  3     carvedilol (COREG) 6.25 MG tablet        gabapentin (NEURONTIN) 300 MG capsule Take 900 mg by mouth At Bedtime   1     gabapentin (NEURONTIN) 800 MG tablet Take 1,200 mg by mouth 3 times daily        leflunomide (ARAVA) 20 MG tablet TK 1 T PO QD  0     predniSONE (DELTASONE) 20 MG tablet Take 1 tablet (20 mg) by mouth daily (Patient not taking: Reported on 10/22/2019) 5 tablet 0     predniSONE (DELTASONE) 5 MG tablet   1     sildenafil (VIAGRA) 100 MG cap/tab Take 1 tablet (100 mg) by mouth daily as needed for erectile dysfunction Do not take with NTG medication (Patient not taking: Reported on 4/2/2019) 10 tablet 1     traZODone (DESYREL) 100 MG tablet Take 100 mg by mouth         PAST SURGICAL HISTORY:  Past Surgical History:   Procedure Laterality Date     BACK SURGERY      cervical fusion 2005     BIOPSY ARTERY TEMPORAL Left 4/4/2019    Procedure: Left Temporal Artery Biopsy;  Surgeon: Bette Curry MD;  Location: [facility] OR     BLEPHAROPLASTY BILATERAL  2/27/2012    Procedure:BLEPHAROPLASTY BILATERAL; BILATERAL UPPER LID BLEPHAROPLASTY ; Surgeon:JESSE LINDSEY; Location:[facility]     CARDIAC SURGERY      stent 2003     CYSTOSCOPY       ORTHOPEDIC SURGERY      bilat hip replaced 2008,2009     PROSTATE SURGERY         ALLERGIES:     Allergies   Allergen Reactions      "Hmg-Coa-R Inhibitors Muscle Pain (Myalgia)     Other reaction(s): Myalgia     Oxycodone Other (See Comments)     Other reaction(s): Hallucinations  Hallucinations.     Sulfasalazine      Sulfa Drugs Rash       FAMILY HISTORY:  Family History   Problem Relation Age of Onset     Arthritis Mother      Hypertension Mother      Hypertension Father      Heart Failure Father      Heart Disease Maternal Grandfather      Heart Disease Paternal Grandmother        SOCIAL HISTORY:  Social History     Tobacco Use     Smoking status: Never Smoker     Smokeless tobacco: Never Used   Substance Use Topics     Alcohol use: Yes     Comment: 1-2 alcoholic beverages per month     Drug use: No       ROS:   A comprehensive 10 point review of systems negative other than as mentioned in HPI.  Exam:  /88 (BP Location: Right arm, Patient Position: Chair, Cuff Size: Adult Large)   Pulse 66   Ht 1.88 m (6' 2\")   Wt 111.1 kg (245 lb)   SpO2 93%   BMI 31.46 kg/m    GENERAL APPEARANCE: alert and no distress  HEENT: no icterus, no xanthelasmas, normal pupil size and reaction, normal palate, mucosa moist, no central cyanosis  NECK: no adenopathy, no asymmetry, masses, or scars, thyroid normal to palpation and no bruits, JVP not elevated  RESPIRATORY: lungs clear to auscultation - no rales, rhonchi or wheezes, no use of accessory muscles, no retractions, respirations are unlabored, normal respiratory rate  CARDIOVASCULAR: regular rhythm, normal S1 with physiologic split S2, no S3 or S4 and soft murmur.  ABDOMEN: soft, non tender, bowel sounds normal, non-distended  EXTREMITIES: peripheral pulses normal, no edema  NEURO: alert and oriented to person/place/time, normal speech, gait and affect  SKIN: no ecchymoses, no rashes  PSYCH: normal affect, cooperative    Labs:  Reviewed.     Testing/Procedures:    7/20/18 ECHOCARDIOGRAM ([facility] REPORT):   Final Conclusion   Normal left ventricular size. Borderline normal left ventricular ejection " fraction estimated   at 50%.   Basilar inferior and posterior hypokinesis.   Mild left ventricular hypertrophy.   Mild to moderate left atrial dilation.   No significant valvular heart disease noted.   Right ventricular systolic pressure cannot be accurately estimated.   Estimated EF: 50%    8/6/18 NM STRESS ([facility] REPORT):  Conclusion  1. Myocardial perfusion imaging is abnormal.  2. Stress SPECT images demonstrate a small sized fixed defect of mild   severity in the apical wall and a small to medium sized mostly fixed   defect of moderate severity in the lateral wall with minimal   reversibility.  3. Gated stress SPECT imaging reveals normal wall motion  4. Calculated LVEF 62%.  5. Please see radiology report for non-cardiac findings.     2015 CORONARY ANGIOGRAM ([facility] REPORT):    The LMCA is free of significant disease.    The LAD is free of significant disease.    The Circumflex is free of significant disease.    The 2nd Marginal is free of significant disease.    50% stenosis in the Proximal RCA    100% stenosis in the Proximal RCA (Restenosis - Drug Eluting Stent)    Left ventricular ejection fraction is 65%. LV Pressure = 132/10.    Unsuccessful to Proximal RCA, post stenosis 100%      Assessment and Plan:   Mr. Seymour is a 74 year old male who has a past medical history significant for CAD s/p PCI RCA 2003 now  RCA, PCI LAD 2012, HTN, HLD, PAF (CHADSVASC 3) s/p PVI 8/2011, BAV with mild aortic stenosis, iliac artery aneurysm, GERD, polymyalgia rheumatica, IVANA (uses CPAP), RLS, and anxiety. He presents today to establish care.     He states he has tried several statins but has not tolerated due to muscle aches. He was tried on low dose Crestor in 7/2019 but did not tolerate this. He reports over the last year he has felt more fatigued. He denies any known recurrences of AF. He does feel that his HR picks up quickly with activity. He also reports someconstant chest pressure associated with numbness/tightness  in his neck and left arm. He endorses dizziness with change in position. He denies any worsening shortness of breath, leg/ankle swelling, PND, orthopnea, palpitations, or syncopal symptoms. Presenting 12 lead ECG shows SR with PVCs Vent Rate 68 bpm,  ms,  ms, QTc 452 ms.  Current cardiac medications include: Coreg, and ASA.     Paroxysmal Atrial Fibrillation:   1. Stroke Prophylaxis:  CHADSVASC=+CAD, +age, +HTN  3, corresponding to a 3.2% annual stroke / systemic emolism event rate. indicating need for long term oral anticoagulation.  He has now previously been on anticoagulation for unknown reasons.   Anticoagulants include warfarin (Coumadin) and the noval oral anticoagulant agents: dabigatran (Pradaxa), rivaroxaban (Xarelto), and apixaban (Eliquis). The benefits of warfarin include: low cost, established track record in reduction of stroke and systemic embolism, the ability to reverse the agents, the ability to titrate the agent, and the ability to provide additional protection in subsets of patients who require anticoagulation for an independent process (i.e. prosthetic valve). The disadvantages of warfarin include: frequent phlebotomy for INRs, difficulty in regulation of INR [typically  60-65% of INRs within therapeutic range], and dietary constraints secondary to bioavailability. The benefits of the novel oral anticoagulants, as a class, include:  no phlebotomy, similar or significantly lower risk of stroke or systemic embolism depending on the agent, and similar or significantly lower risk of bleeding, particularly intracranial bleeding. The disadvantages of the novel agents, as a class, include: higher cost, the inability to reverse the agents (except Pradaxa), and  the need to be cautious in patients with CKD.   We will start him on Eliquis 5 mg BID.   2. Rate Control: Continue Coreg.   3. Rhythm Control: s/p PVI 2011, doing well without significant issues. No compelling indication for further  rhythm control at this time.   4. Risk Factor Management: maintain good BP control and continued IVANA treatment.      Coronary Artery Disease:  History of PCIs LAD and RCA  1. Aspirin: Continue ASA 81 mg daily.   2. Statin: Has not tolerated several statins. Most recently tried low dose crestor in 7/2019 which he stopped due to muscle aches.   3. BB: Continue Coreg  4. Nitroglycerin 0.4 mg PRN for chest pain. Place 1 tablet (0.4 mg) under the tongue every 5 minutes as needed for chest pain. Maximum of 3 doses in 15 minutes.  5. Lifestyle: Cardiac diet, avoid tobacco, maintain a healthy weight, limit alcohol, and exercise.  6. Given report of chest pain will do stress CMRI.     Bicuspid Aortic Valve:  1. Reviewed recommendations for screening of family  2. Does need updated imaging. He has never had imaging of aorta to evaluate for any dilation often seen with BAV. Getting stress MRI (as above) which will also given us valve imaging we will also do concomitant MRA.   3. Maintain good BP control     Follow up in 6 months.   The patient states understanding and is agreeable with plan.   This patient was seen and evaluated with Dr. Willson. The above note reflects our joint assessment and plan.   ANDRZEJ Staton CNP  Pager: [phone]    EP STAFF NOTE  I have seen and examined the patient as part of a shared visit with LIUDMILA Staton NP.  I agree with the note above. I reviewed today's vital signs and medications. I have reviewed and discussed with the advanced practice provider their physical examination, assessment, and plan   Briefly, PAF, CAD, BAV, doing well  My key history/exam findings are: RRR.   The key management decisions made by me: start AC, stress MRI/MRA.    Lior Willson MD [facility]  Cardiology - Electrophysiology    CC  WENDY LIM

## 2019-12-04 NOTE — PATIENT INSTRUCTIONS
You were seen in the Electrophysiology today by: Dr. Willson    Plan:     Medication Changes:     Eliquis 5mg twice a day    Valium 5mg ONCE - bring pill to your MRI. Paper Rx given.     Labs/Tests Needed:    Stress cardiac MRI & MRA    Follow up visit: 6 months in [facility] clinic with Dr. Willson        Cardiac Stress MRI:  Please arrive to the [facility] Waiting Room in the [facility].   1. NO caffeine for 12 hours prior to test (includes coffee (decaf too), tea (decaf too), soda, chocolate, energy drinks, etc. )  2. Hold these medications:    1. Viagra, Levitra, Cialis, Aggrenox 48 hours prior to test  3. You will be required to lay flat and follow breath-hold instructions.   4. You will need to remove all metal and answer a safety questionnaire.     If you are receiving oral sedation:  IF YOU WILL RECEIVE SEDATION (take medicine to help you relax during your exam):    You must get the oral medicine from your doctor before you arrive. Bring the medicine to the exam. Do not take it at home.     Arrive one hour early. Bring someone who can take you home after the test. Your medicine will make you sleepy. After the exam, you may not drive, take a bus or take a taxi by yourself.    No eating 8 hours before your exam. You may have clear liquids up until 4 hours before your exam. (Clear liquids include water, clear tea, black coffee and fruit juice without pulp.)        If you have further questions, please utilize [employer] to contact us.   If your question concerns the above instructions, contact:  Zulay Akins RN   Electrophysiology Nurse Coordinator.  [phone]    If your question concerns the schedule/appointment times, contact:  LIUDMILA [facility]once Procedure   [phone]

## 2019-12-04 NOTE — NURSING NOTE
Chief Complaint   Patient presents with     New Patient     Establish care for paroxysmal atrial fibrillation. Echo last 7/2018 - Care Everywhere.      Vitals were taken and medications were reconciled. EKG was performed.    Lelo Baez CMA    1:06 PM

## 2019-12-05 LAB — INTERPRETATION ECG - MUSE: NORMAL

## 2019-12-26 ENCOUNTER — HOSPITAL ENCOUNTER (OUTPATIENT)
Dept: CARDIOLOGY | Facility: CLINIC | Age: 74
End: 2019-12-26
Attending: INTERNAL MEDICINE
Payer: MEDICARE

## 2019-12-26 ENCOUNTER — HOSPITAL ENCOUNTER (OUTPATIENT)
Dept: CARDIOLOGY | Facility: CLINIC | Age: 74
Discharge: HOME | End: 2019-12-26
Attending: INTERNAL MEDICINE | Admitting: INTERNAL MEDICINE
Payer: MEDICARE

## 2019-12-26 VITALS — DIASTOLIC BLOOD PRESSURE: 75 MMHG | OXYGEN SATURATION: 94 % | HEART RATE: 63 BPM | SYSTOLIC BLOOD PRESSURE: 137 MMHG

## 2019-12-26 VITALS — SYSTOLIC BLOOD PRESSURE: 167 MMHG | OXYGEN SATURATION: 97 % | DIASTOLIC BLOOD PRESSURE: 82 MMHG

## 2019-12-26 DIAGNOSIS — R07.89 ATYPICAL CHEST PAIN: ICD-10-CM

## 2019-12-26 DIAGNOSIS — I25.118 CORONARY ARTERY DISEASE INVOLVING NATIVE HEART WITH OTHER FORM OF ANGINA PECTORIS, UNSPECIFIED VESSEL OR LESION TYPE (H): ICD-10-CM

## 2019-12-26 DIAGNOSIS — Q23.81 BICUSPID AORTIC VALVE: ICD-10-CM

## 2019-12-26 DIAGNOSIS — Z86.79 PERSONAL HISTORY OF OTHER DISEASES OF THE CIRCULATORY SYSTEM: ICD-10-CM

## 2019-12-26 LAB
CREAT BLD-MCNC: 0.9 MG/DL (ref 0.66–1.25)
GFR SERPL CREATININE-BSD FRML MDRD: 83 ML/MIN/{1.73_M2}

## 2019-12-26 PROCEDURE — 82565 ASSAY OF CREATININE: CPT

## 2019-12-26 PROCEDURE — 71555 MRI ANGIO CHEST W OR W/O DYE: CPT | Mod: 26 | Performed by: INTERNAL MEDICINE

## 2019-12-26 PROCEDURE — 75563 CARD MRI W/STRESS IMG & DYE: CPT | Mod: 26 | Performed by: INTERNAL MEDICINE

## 2019-12-26 PROCEDURE — 93016 CV STRESS TEST SUPVJ ONLY: CPT | Performed by: INTERNAL MEDICINE

## 2019-12-26 PROCEDURE — A9585 GADOBUTROL INJECTION: HCPCS | Performed by: INTERNAL MEDICINE

## 2019-12-26 PROCEDURE — 75565 CARD MRI VELOC FLOW MAPPING: CPT | Mod: 26 | Performed by: INTERNAL MEDICINE

## 2019-12-26 PROCEDURE — 25000128 H RX IP 250 OP 636: Performed by: INTERNAL MEDICINE

## 2019-12-26 PROCEDURE — 25500064 ZZH RX 255 OP 636: Performed by: INTERNAL MEDICINE

## 2019-12-26 PROCEDURE — 93017 CV STRESS TEST TRACING ONLY: CPT

## 2019-12-26 PROCEDURE — 93018 CV STRESS TEST I&R ONLY: CPT | Performed by: INTERNAL MEDICINE

## 2019-12-26 PROCEDURE — 71555 MRI ANGIO CHEST W OR W/O DYE: CPT

## 2019-12-26 RX ORDER — DIPHENHYDRAMINE HYDROCHLORIDE 50 MG/ML
25-50 INJECTION INTRAMUSCULAR; INTRAVENOUS
Status: DISCONTINUED | OUTPATIENT
Start: 2019-12-26 | End: 2019-12-27 | Stop reason: HOSPADM

## 2019-12-26 RX ORDER — ONDANSETRON 2 MG/ML
4 INJECTION INTRAMUSCULAR; INTRAVENOUS
Status: DISCONTINUED | OUTPATIENT
Start: 2019-12-26 | End: 2019-12-27 | Stop reason: HOSPADM

## 2019-12-26 RX ORDER — ACYCLOVIR 200 MG/1
0-1 CAPSULE ORAL
Status: DISCONTINUED | OUTPATIENT
Start: 2019-12-26 | End: 2019-12-27 | Stop reason: HOSPADM

## 2019-12-26 RX ORDER — REGADENOSON 0.08 MG/ML
0.4 INJECTION, SOLUTION INTRAVENOUS ONCE
Status: DISCONTINUED | OUTPATIENT
Start: 2019-12-26 | End: 2019-12-27 | Stop reason: HOSPADM

## 2019-12-26 RX ORDER — GADOBUTROL 604.72 MG/ML
5-65 INJECTION INTRAVENOUS ONCE
Status: COMPLETED | OUTPATIENT
Start: 2019-12-26 | End: 2019-12-26

## 2019-12-26 RX ORDER — DIPHENHYDRAMINE HCL 25 MG
25 CAPSULE ORAL
Status: DISCONTINUED | OUTPATIENT
Start: 2019-12-26 | End: 2019-12-27 | Stop reason: HOSPADM

## 2019-12-26 RX ORDER — AMINOPHYLLINE 25 MG/ML
100 INJECTION, SOLUTION INTRAVENOUS ONCE
Status: DISCONTINUED | OUTPATIENT
Start: 2019-12-26 | End: 2019-12-27 | Stop reason: HOSPADM

## 2019-12-26 RX ORDER — DIAZEPAM 5 MG
5 TABLET ORAL EVERY 30 MIN PRN
Status: DISCONTINUED | OUTPATIENT
Start: 2019-12-26 | End: 2019-12-27 | Stop reason: HOSPADM

## 2019-12-26 RX ORDER — ALBUTEROL SULFATE 90 UG/1
2 AEROSOL, METERED RESPIRATORY (INHALATION) EVERY 5 MIN PRN
Status: DISCONTINUED | OUTPATIENT
Start: 2019-12-26 | End: 2019-12-27 | Stop reason: HOSPADM

## 2019-12-26 RX ORDER — REGADENOSON 0.08 MG/ML
0.4 INJECTION, SOLUTION INTRAVENOUS ONCE
Status: COMPLETED | OUTPATIENT
Start: 2019-12-26 | End: 2019-12-26

## 2019-12-26 RX ORDER — AMINOPHYLLINE 25 MG/ML
50-100 INJECTION, SOLUTION INTRAVENOUS
Status: DISCONTINUED | OUTPATIENT
Start: 2019-12-26 | End: 2019-12-27 | Stop reason: HOSPADM

## 2019-12-26 RX ORDER — METHYLPREDNISOLONE SODIUM SUCCINATE 125 MG/2ML
125 INJECTION, POWDER, LYOPHILIZED, FOR SOLUTION INTRAMUSCULAR; INTRAVENOUS
Status: DISCONTINUED | OUTPATIENT
Start: 2019-12-26 | End: 2019-12-27 | Stop reason: HOSPADM

## 2019-12-26 RX ADMIN — GADOBUTROL 30 ML: 604.72 INJECTION INTRAVENOUS at 11:50

## 2019-12-26 RX ADMIN — REGADENOSON 0.4 MG: 0.08 INJECTION, SOLUTION INTRAVENOUS at 10:20

## 2019-12-26 NOTE — PROGRESS NOTES
Pt. Here for MRI.  Claustrophobic and took prescribed 5mg. Of valium here at @09:00.  Wife present and going to be driving pt. Home after proc.n

## 2019-12-26 NOTE — PROGRESS NOTES
Ash Amos MD  P Cv Mri Tech             Stress, MRA    Contrast administered per weight based protocol.

## 2019-12-30 ENCOUNTER — MYC MEDICAL ADVICE (OUTPATIENT)
Dept: CARDIOLOGY | Facility: CLINIC | Age: 74
End: 2019-12-30

## 2019-12-30 DIAGNOSIS — I48.0 PAROXYSMAL ATRIAL FIBRILLATION (H): Primary | ICD-10-CM

## 2019-12-31 NOTE — TELEPHONE ENCOUNTER
Ziopatch ordered per result note. Hook up to be scheduled 1/2/20 at 11am per MyChart communication; clinic coordinators notified to schedule.     Result Notes for MR Cardiac w Contrast Stress and Flow     Notes recorded by Connie Robledo APRN CNP on 12/30/2019 at 1:07 PM CST  Normal biventricular function, no ischemia, evidence of prior infarct (known CAD hx), and BAV (known) with mild aortic stenosis and mild aortic dilation. Would repeat imagining in 1 year for BAV/aorta monitoring. Would do zio patch monitor to evaluate for any evidence of subclinical AF and/or chronotropy issues. Can follow up in 6 months or sooner based on heart monitor results.

## 2020-01-02 ENCOUNTER — ANCILLARY PROCEDURE (OUTPATIENT)
Dept: CARDIOLOGY | Facility: CLINIC | Age: 75
End: 2020-01-02
Attending: INTERNAL MEDICINE
Payer: MEDICARE

## 2020-01-02 DIAGNOSIS — I48.0 PAROXYSMAL ATRIAL FIBRILLATION (H): ICD-10-CM

## 2020-01-02 PROCEDURE — 0296T ZIO PATCH HOLTER ADULT PEDIATRIC GREATER THAN 48 HRS: CPT | Mod: ZF

## 2020-01-02 PROCEDURE — 0298T ZIO PATCH HOLTER ADULT PEDIATRIC GREATER THAN 48 HRS: CPT | Mod: ZP | Performed by: INTERNAL MEDICINE

## 2020-01-02 NOTE — PROGRESS NOTES
Per Dr. Willson, patient to have 14 day Zio Patch monitor placed.  Diagnosis: Paroxysmal atrial fibrillation, I48.0  Monitor placed: Yes  Patient Instructed: Yes  Patient verbalized understanding: Yes  Holter # [id number]  Monitor placed by Zion Wilson CMA  11:09 AM

## 2020-02-10 ENCOUNTER — HEALTH MAINTENANCE LETTER (OUTPATIENT)
Age: 75
End: 2020-02-10

## 2020-04-13 ENCOUNTER — MYC MEDICAL ADVICE (OUTPATIENT)
Dept: UROLOGY | Facility: CLINIC | Age: 75
End: 2020-04-13

## 2020-04-14 ENCOUNTER — HOSPITAL ENCOUNTER (OUTPATIENT)
Dept: ULTRASOUND IMAGING | Facility: CLINIC | Age: 75
Discharge: HOME | End: 2020-04-14
Attending: UROLOGY | Admitting: UROLOGY
Payer: MEDICARE

## 2020-04-14 ENCOUNTER — VIRTUAL VISIT (OUTPATIENT)
Dept: UROLOGY | Facility: CLINIC | Age: 75
End: 2020-04-14
Payer: MEDICARE

## 2020-04-14 DIAGNOSIS — N50.89 TESTICULAR SWELLING: ICD-10-CM

## 2020-04-14 DIAGNOSIS — N50.89 TESTICULAR SWELLING: Primary | ICD-10-CM

## 2020-04-14 DIAGNOSIS — C61 PROSTATE CANCER (H): ICD-10-CM

## 2020-04-14 DIAGNOSIS — N50.89 PERINEAL CYST IN MALE: Primary | ICD-10-CM

## 2020-04-14 PROCEDURE — 99441 ZZC PHYSICIAN TELEPHONE EVALUATION 5-10 MIN: CPT | Performed by: UROLOGY

## 2020-04-14 PROCEDURE — 93976 VASCULAR STUDY: CPT

## 2020-04-14 RX ORDER — LEVOFLOXACIN 500 MG/1
500 TABLET, FILM COATED ORAL DAILY
Qty: 10 TABLET | Refills: 0 | Status: SHIPPED | OUTPATIENT
Start: 2020-04-14

## 2020-04-14 NOTE — PROGRESS NOTES
"Cy Seymour is a 74 year old male who is being evaluated via a billable telephone visit.      The patient has been notified of following:     \"This telephone visit will be conducted via a call between you and your physician/provider. We have found that certain health care needs can be provided without the need for a physical exam.  This service lets us provide the care you need with a short phone conversation.  If a prescription is necessary we can send it directly to your pharmacy.  If lab work is needed we can place an order for that and you can then stop by our lab to have the test done at a later time.    Telephone visits are billed at different rates depending on your insurance coverage. During this emergency period, for some insurers they may be billed the same as an in-person visit.  Please reach out to your insurance provider with any questions.    If during the course of the call the physician/provider feels a telephone visit is not appropriate, you will not be charged for this service.\"    Patient has given verbal consent for Telephone visit?  Yes    How would you like to obtain your AVS? MyChart    Additional provider notes:  Patient notes perineal cyst/bulge. Has had previously and resolved with antibiotics. U/s today showed fluid collection in the perineum but no scrotal abscess or testicular findings. We discussed U/s, antibiotics, possible need for drainage.   Also discussed follow up for prostate cancer as previously scheduled.     Phone call duration: 7 minutes    Zulay Lopez MD      "

## 2020-04-14 NOTE — PROGRESS NOTES
Spoke to patient .  Can get testicular ultrasound today or tomorrow. Start antibiotic  today  Levaquin sent in.Mallory Kenny LPN

## 2020-04-14 NOTE — TELEPHONE ENCOUNTER
[facility] Call Center    Phone Message    May a detailed message be left on voicemail: yes     Reason for Call: Other: Cy calling to request a call back. He would like to speak with a nurse in regards to MyC message pt sent 04/14. Please see message below.      Action Taken: Message routed to:  [facility] ([facility]): amanda uro     Travel Screening: Not Applicable

## 2020-04-15 ENCOUNTER — HOSPITAL ENCOUNTER (EMERGENCY)
Facility: CLINIC | Age: 75
Discharge: HOME | End: 2020-04-15
Attending: PHYSICIAN ASSISTANT | Admitting: PHYSICIAN ASSISTANT
Payer: MEDICARE

## 2020-04-15 VITALS
HEART RATE: 70 BPM | BODY MASS INDEX: 31.46 KG/M2 | HEIGHT: 74 IN | SYSTOLIC BLOOD PRESSURE: 133 MMHG | DIASTOLIC BLOOD PRESSURE: 93 MMHG | RESPIRATION RATE: 18 BRPM | OXYGEN SATURATION: 96 %

## 2020-04-15 DIAGNOSIS — N49.2 SCROTAL WALL ABSCESS: ICD-10-CM

## 2020-04-15 LAB
ANION GAP SERPL CALCULATED.3IONS-SCNC: 6 MMOL/L (ref 3–14)
BASOPHILS # BLD AUTO: 0 10E9/L (ref 0–0.2)
BASOPHILS NFR BLD AUTO: 0.3 %
BUN SERPL-MCNC: 15 MG/DL (ref 7–30)
CALCIUM SERPL-MCNC: 8.8 MG/DL (ref 8.5–10.1)
CHLORIDE SERPL-SCNC: 106 MMOL/L (ref 94–109)
CO2 SERPL-SCNC: 25 MMOL/L (ref 20–32)
CREAT SERPL-MCNC: 0.81 MG/DL (ref 0.66–1.25)
DIFFERENTIAL METHOD BLD: ABNORMAL
EOSINOPHIL # BLD AUTO: 0.2 10E9/L (ref 0–0.7)
EOSINOPHIL NFR BLD AUTO: 1.4 %
ERYTHROCYTE [DISTWIDTH] IN BLOOD BY AUTOMATED COUNT: 14.2 % (ref 10–15)
GFR SERPL CREATININE-BSD FRML MDRD: 87 ML/MIN/{1.73_M2}
GLUCOSE SERPL-MCNC: 87 MG/DL (ref 70–99)
HCT VFR BLD AUTO: 38.3 % (ref 40–53)
HGB BLD-MCNC: 12 G/DL (ref 13.3–17.7)
IMM GRANULOCYTES # BLD: 0 10E9/L (ref 0–0.4)
IMM GRANULOCYTES NFR BLD: 0.3 %
LYMPHOCYTES # BLD AUTO: 1 10E9/L (ref 0.8–5.3)
LYMPHOCYTES NFR BLD AUTO: 9.5 %
MCH RBC QN AUTO: 27.8 PG (ref 26.5–33)
MCHC RBC AUTO-ENTMCNC: 31.3 G/DL (ref 31.5–36.5)
MCV RBC AUTO: 89 FL (ref 78–100)
MONOCYTES # BLD AUTO: 1 10E9/L (ref 0–1.3)
MONOCYTES NFR BLD AUTO: 9.6 %
NEUTROPHILS # BLD AUTO: 8.6 10E9/L (ref 1.6–8.3)
NEUTROPHILS NFR BLD AUTO: 78.9 %
NRBC # BLD AUTO: 0 10*3/UL
NRBC BLD AUTO-RTO: 0 /100
PLATELET # BLD AUTO: 327 10E9/L (ref 150–450)
POTASSIUM SERPL-SCNC: 3.8 MMOL/L (ref 3.4–5.3)
RBC # BLD AUTO: 4.31 10E12/L (ref 4.4–5.9)
SODIUM SERPL-SCNC: 137 MMOL/L (ref 133–144)
WBC # BLD AUTO: 10.9 10E9/L (ref 4–11)

## 2020-04-15 PROCEDURE — 99283 EMERGENCY DEPT VISIT LOW MDM: CPT

## 2020-04-15 PROCEDURE — 80048 BASIC METABOLIC PNL TOTAL CA: CPT | Performed by: EMERGENCY MEDICINE

## 2020-04-15 PROCEDURE — 85025 COMPLETE CBC W/AUTO DIFF WBC: CPT | Performed by: EMERGENCY MEDICINE

## 2020-04-15 ASSESSMENT — ENCOUNTER SYMPTOMS
FEVER: 0
DYSURIA: 0

## 2020-04-15 NOTE — DISCHARGE INSTRUCTIONS
*Continue your antibiotics.  *Dr. Lopez's office will call you to schedule the surgical drainage procedure.  *Return if you develop high fever, worsening pain, spreading redness or warmth, faint or feel like you will faint or become worse in any way.

## 2020-04-15 NOTE — ED AVS SNAPSHOT
Emergency Department  [address] [facility] [address]  Phone:  [phone]  Fax:  [phone]                                    Cy Seymour   MRN: [id number]    Department:   Emergency Department   Date of Visit:  4/15/2020           After Visit Summary Signature Page    I have received my discharge instructions, and my questions have been answered. I have discussed any challenges I see with this plan with the nurse or doctor.    ..........................................................................................................................................  Patient/Patient Representative Signature      ..........................................................................................................................................  Patient Representative Print Name and Relationship to Patient    ..................................................               ................................................  Date                                   Time    ..........................................................................................................................................  Reviewed by Signature/Title    ...................................................              ..............................................  Date                                               Time          22EPIC Rev 08/18

## 2020-04-15 NOTE — ED PROVIDER NOTES
Emergency Department Attending Supervision Note  4/15/2020  12:30 PM    I evaluated this patient in conjunction with Jennifer Kohler PA-C.      Briefly, the patient had an ultrasound of the testicular and scrotum with doppler limited yesterday, with results as below. He is following up on these results today.    ULTRASOUND TESTICULAR AND SCROTUM WITH DOPPLER LIMITED (4/14/2020)  IMPRESSION:  1.  Complex fluid collections at the left inferior and lateral scrotal wall worrisome for abscess and phlegmon. See above for measurements.  2.  Small left epididymal cyst.  3.  Left-sided varicocele.  4.  No testicular parenchymal lesion or acute testicular abnormality  is seen.     On my exam, patient is well-appearing.  I do not see any signs of a cutaneous abscess.  There is a firm mildly tender mass in the left scrotum.  No active drainage.  No signs of Denny's gangrene.    Results:  BMP: Glucose 87, o/w WNL (Creatinine: 0.81)  CBC: WBC: 10.9, HGB: 12.0, PLT: 327    ED course:  Past medical records, nursing notes, and vitals reviewed.    1150 Jennifer Kohler PA-C consulted me regarding the patient's history and presentation here in the emergency department.    1200 I performed an exam of the patient as documented above.     1228 I consulted with Dr. Dudley, with Urology, regarding the patient's history and presentation here in the emergency department.    1403 I again consulted with Dr. Dudley regarding the patient's condition.     My impression is the patient has a scrotal abscess or phlegmon.  The patient is nontoxic with normal vital signs, no fever and with a normal white blood cell count.  Dr. Nguyen of urology recommended that the patient continue the levofloxacin as he does not appear septic or toxic and schedule for outpatient surgical drainage tomorrow or the next day in order to protect him from COVID infection in the hospital.  The patient was in agreement with this and felt comfortable with this plan.  He  is aware that he is to return should he become worse in any way.  With reasonable clinical confidence, I feel he safe for discharge home at this time.    Diagnosis    ICD-10-CM    1. Scrotal wall abscess  N49.2          I, Renetta Mar, am serving as a scribe on 4/15/2020 at 12:30 PM to personally document services performed by Jennifer Richard MD based on my observations and the provider's statements to me.      Jennifer Richard MD  04/15/20 1506

## 2020-04-15 NOTE — ED PROVIDER NOTES
History     Chief Complaint:  Testicular/scrotal Pain       HPI   Cy Seymour is a 74 year old male who presents for evaluation of testicular/scrotal pain. Per chart review, the patient called his Urologist, Dr. Lopez, 2 days ago reporting that the cyst on his perineum had increased in size and was causing significant discomfort. Dr. Puentes ordered a testicular ultrasound (see results below), which was completed yesterday. The patient was also prescribed Levaquin, and has taken 2 doses. After the ultrasound results were received, the patient was told to visit the emergency department for further evaluation and management. He reports persistent pain. Denies fever and urinary symptoms.     ULTRASOUND TESTICULAR AND SCROTUM WITH DOPPLER LIMITED 4/14/2020 2:40PM  IMPRESSION:  1.  Complex fluid collections at the left inferior and lateral scrotal  wall worrisome for abscess and phlegmon. See above for measurements.  2.  Small left epididymal cyst.  3.  Left-sided varicocele.  4.  No testicular parenchymal lesion or acute testicular abnormality  is seen.     JOSÉ MIGUEL SILVA MD    Allergies:  Hmg-Coa-R Inhibitors  Oxycodone  Sulfasalazine  Sulfa Drugs    Medications:   Adderall  Eliquis  Coreg  Valium  Gabapentin  Arava  Levaquin  Viagra  Trazodone    Past Medical History:    ADHD  Aneurysm artery, iliac  Anxiety  Atrial fibrillation   Bicuspid aortic valve  Coronary artery disease  Depression  Diverticulitis  GERD  Hearing loss  Spinal cord injury   Hypertension  Hyperlipidemia  Chronic pain  Polymyalgia rheumatica  Prostate infection  Restless legs  Seasonal affective disorder  Sleep apnea  Tachycardia  Insomnia  Benign prostatic hyperplasia  Paroxysmal atrial fibrillation      Past Surgical History:    Back surgery, cervical fusion  Biopsy artery temporal   Blepharoplasty bilateral  Cardiac stent placement (2003)  Cystoscopy   Bilateral hip replacements (2008 and 2009)  Prostate surgery     Family History:   "  Mother: arthritis, hypertension  Father: hypertension, heart disease, heart failure    Social History:  The patient presents unaccompanied to the ED.  PCP: Lisa Mcleod  Smoking status: Never Smoker  Smokeless tobacco: Never Used  Alcohol use: Positive  Drug use: Negative    Review of Systems   Constitutional: Negative for fever.   Genitourinary: Positive for testicular pain. Negative for dysuria.   All other systems reviewed and are negative.    Physical Exam     Patient Vitals for the past 24 hrs:   BP Pulse Resp SpO2 Height   04/15/20 1420 (!) 133/93 70 -- 96 % --   04/15/20 1146 (!) 155/101 84 18 96 % 1.88 m (6' 2\")       Physical Exam  General: Alert, interactive. GCS 15  Head:  Scalp is atraumatic.        Neck:  Normal range of motion.   CV:  Regular rate and rhythm.   Resp:  Breath sounds are clear bilaterally. Non-labored, no retractions or accessory muscle use.  GI:  Abdomen is soft, no distension, no tenderness.  :   Left inferior scrotal tender, erythematous, and swollen. No obvious fluctuance. No signs of yamilet's gangrene.   MS:  Normal range of motion.   Skin:  Warm and dry.   Neuro:  Strength and sensation grossly intact.   Psych:  Awake. Alert.  Appropriate interactions.       Emergency Department Course     Laboratory:   CBC: WBC: 10.9, HGB 12.0 (L), PLT: 327  BMP: all WNL (Creatinine: 0.81)    Emergency Department Course:  Past medical records, nursing notes, and vitals reviewed.   1200 I performed an exam of the patient and obtained history, as documented above.    I performed a testicular exam with Dr. Richard and a male chaperone.     1232 Dr. Richard spoke with Dr. Dudley, Urology, regarding the patient.     IV was inserted and blood was drawn for laboratory testing, results above.     1425 I rechecked the patient. Explained findings to the Patient.    Findings and plan explained to the Patient. Patient discharged home with instructions regarding supportive care, medications, and reasons " to return. The importance of close follow-up was reviewed.     I personally reviewed the laboratory results with the Patient and answered all related questions prior to discharge.     Impression & Plan      Medical Decision Making:  Cy Seymour is a 74 year old male presents emergency department with scrotal abscess versus phlegmon seen on ultrasound.  Normal vitals and patient is nontoxic-appearing.  No leukocytosis.  Dr. Richard discussed patient with  of urology who recommended continuing Levaquin and plan for outpatient surgical drainage tomorrow.  The patient agrees with this plan.  He understands to return to emergency department for fevers, increasing pain, spreading redness, or any other new/concerning symptoms.    Diagnosis:    ICD-10-CM    1. Scrotal wall abscess  N49.2         Disposition:  Discharged to home.    Scribe Disclosure:  I, Mary Grace Carrion, am serving as a scribe at 11:48 AM on 4/15/2020 to document services personally performed by Jennifer Kohler PA-C based on my observations and the provider's statements to me.     4/15/2020   Jennifer Ayala PA-C  04/15/20 1558

## 2020-04-15 NOTE — ED TRIAGE NOTES
Patient comes in with complaints of scrotal swelling. States was seen by MD yesterday and had ultrasound. Patient informed he had an infection and it needed to be drained.

## 2020-04-17 ENCOUNTER — TELEPHONE (OUTPATIENT)
Dept: UROLOGY | Facility: CLINIC | Age: 75
End: 2020-04-17

## 2020-04-17 ENCOUNTER — ANESTHESIA (OUTPATIENT)
Dept: SURGERY | Facility: CLINIC | Age: 75
End: 2020-04-17
Payer: MEDICARE

## 2020-04-17 ENCOUNTER — HOSPITAL ENCOUNTER (OUTPATIENT)
Facility: CLINIC | Age: 75
Setting detail: OBSERVATION
Discharge: HOME | End: 2020-04-19
Attending: UROLOGY | Admitting: UROLOGY
Payer: MEDICARE

## 2020-04-17 ENCOUNTER — OFFICE VISIT (OUTPATIENT)
Dept: UROLOGY | Facility: CLINIC | Age: 75
End: 2020-04-17
Payer: MEDICARE

## 2020-04-17 ENCOUNTER — ANCILLARY PROCEDURE (OUTPATIENT)
Dept: CT IMAGING | Facility: CLINIC | Age: 75
End: 2020-04-17
Attending: UROLOGY
Payer: MEDICARE

## 2020-04-17 ENCOUNTER — ANESTHESIA EVENT (OUTPATIENT)
Dept: SURGERY | Facility: CLINIC | Age: 75
End: 2020-04-17
Payer: MEDICARE

## 2020-04-17 VITALS — WEIGHT: 230 LBS | HEIGHT: 74 IN | BODY MASS INDEX: 29.52 KG/M2

## 2020-04-17 DIAGNOSIS — N49.2 SCROTAL ABSCESS: ICD-10-CM

## 2020-04-17 DIAGNOSIS — N49.2 SCROTAL ABSCESS: Primary | ICD-10-CM

## 2020-04-17 DIAGNOSIS — K40.90 UNILATERAL INGUINAL HERNIA WITHOUT OBSTRUCTION OR GANGRENE, RECURRENCE NOT SPECIFIED: ICD-10-CM

## 2020-04-17 DIAGNOSIS — L02.215 PERINEAL ABSCESS: Primary | ICD-10-CM

## 2020-04-17 LAB
ANION GAP SERPL CALCULATED.3IONS-SCNC: 8 MMOL/L (ref 3–14)
BUN SERPL-MCNC: 15 MG/DL (ref 7–30)
CALCIUM SERPL-MCNC: 8.8 MG/DL (ref 8.5–10.1)
CHLORIDE SERPL-SCNC: 109 MMOL/L (ref 94–109)
CO2 SERPL-SCNC: 24 MMOL/L (ref 20–32)
CREAT SERPL-MCNC: 0.87 MG/DL (ref 0.66–1.25)
ERYTHROCYTE [DISTWIDTH] IN BLOOD BY AUTOMATED COUNT: 14.1 % (ref 10–15)
GFR SERPL CREATININE-BSD FRML MDRD: 85 ML/MIN/{1.73_M2}
GLUCOSE SERPL-MCNC: 89 MG/DL (ref 70–99)
HCT VFR BLD AUTO: 36.9 % (ref 40–53)
HGB BLD-MCNC: 11.3 G/DL (ref 13.3–17.7)
INR PPP: 1.31 (ref 0.86–1.14)
MCH RBC QN AUTO: 27.7 PG (ref 26.5–33)
MCHC RBC AUTO-ENTMCNC: 30.6 G/DL (ref 31.5–36.5)
MCV RBC AUTO: 90 FL (ref 78–100)
PLATELET # BLD AUTO: 306 10E9/L (ref 150–450)
POTASSIUM SERPL-SCNC: 3.7 MMOL/L (ref 3.4–5.3)
RADIOLOGIST FLAGS: ABNORMAL
RBC # BLD AUTO: 4.08 10E12/L (ref 4.4–5.9)
SODIUM SERPL-SCNC: 141 MMOL/L (ref 133–144)
WBC # BLD AUTO: 9.5 10E9/L (ref 4–11)

## 2020-04-17 PROCEDURE — 25000132 ZZH RX MED GY IP 250 OP 250 PS 637: Mod: GY | Performed by: PHYSICIAN ASSISTANT

## 2020-04-17 PROCEDURE — 27210794 ZZH OR GENERAL SUPPLY STERILE: Performed by: UROLOGY

## 2020-04-17 PROCEDURE — 40000141 ZZH STATISTIC PERIPHERAL IV START W/O US GUIDANCE

## 2020-04-17 PROCEDURE — 96361 HYDRATE IV INFUSION ADD-ON: CPT

## 2020-04-17 PROCEDURE — 36415 COLL VENOUS BLD VENIPUNCTURE: CPT | Performed by: PHYSICIAN ASSISTANT

## 2020-04-17 PROCEDURE — 85610 PROTHROMBIN TIME: CPT | Performed by: PHYSICIAN ASSISTANT

## 2020-04-17 PROCEDURE — 25000132 ZZH RX MED GY IP 250 OP 250 PS 637: Mod: GY | Performed by: STUDENT IN AN ORGANIZED HEALTH CARE EDUCATION/TRAINING PROGRAM

## 2020-04-17 PROCEDURE — 25800030 ZZH RX IP 258 OP 636: Performed by: PHYSICIAN ASSISTANT

## 2020-04-17 PROCEDURE — 85027 COMPLETE CBC AUTOMATED: CPT | Performed by: PHYSICIAN ASSISTANT

## 2020-04-17 PROCEDURE — 80048 BASIC METABOLIC PNL TOTAL CA: CPT | Performed by: PHYSICIAN ASSISTANT

## 2020-04-17 PROCEDURE — 96360 HYDRATION IV INFUSION INIT: CPT

## 2020-04-17 PROCEDURE — G0378 HOSPITAL OBSERVATION PER HR: HCPCS

## 2020-04-17 RX ORDER — IOPAMIDOL 755 MG/ML
135 INJECTION, SOLUTION INTRAVASCULAR ONCE
Status: COMPLETED | OUTPATIENT
Start: 2020-04-17 | End: 2020-04-17

## 2020-04-17 RX ORDER — LIDOCAINE 40 MG/G
CREAM TOPICAL
Status: DISCONTINUED | OUTPATIENT
Start: 2020-04-17 | End: 2020-04-19 | Stop reason: HOSPADM

## 2020-04-17 RX ORDER — LEFLUNOMIDE 20 MG/1
20 TABLET ORAL
Status: DISCONTINUED | OUTPATIENT
Start: 2020-04-18 | End: 2020-04-19 | Stop reason: HOSPADM

## 2020-04-17 RX ORDER — ACETAMINOPHEN 325 MG/1
650 TABLET ORAL EVERY 4 HOURS PRN
Status: DISCONTINUED | OUTPATIENT
Start: 2020-04-17 | End: 2020-04-19 | Stop reason: HOSPADM

## 2020-04-17 RX ORDER — GABAPENTIN 300 MG/1
900 CAPSULE ORAL AT BEDTIME
Status: DISCONTINUED | OUTPATIENT
Start: 2020-04-17 | End: 2020-04-19 | Stop reason: HOSPADM

## 2020-04-17 RX ORDER — SODIUM CHLORIDE 9 MG/ML
INJECTION, SOLUTION INTRAVENOUS CONTINUOUS
Status: DISCONTINUED | OUTPATIENT
Start: 2020-04-17 | End: 2020-04-19

## 2020-04-17 RX ORDER — ACETAMINOPHEN 650 MG/1
650 SUPPOSITORY RECTAL EVERY 4 HOURS PRN
Status: DISCONTINUED | OUTPATIENT
Start: 2020-04-17 | End: 2020-04-18

## 2020-04-17 RX ORDER — NALOXONE HYDROCHLORIDE 0.4 MG/ML
.1-.4 INJECTION, SOLUTION INTRAMUSCULAR; INTRAVENOUS; SUBCUTANEOUS
Status: DISCONTINUED | OUTPATIENT
Start: 2020-04-17 | End: 2020-04-19 | Stop reason: HOSPADM

## 2020-04-17 RX ORDER — CARVEDILOL 3.12 MG/1
3.12 TABLET ORAL 2 TIMES DAILY WITH MEALS
Status: DISCONTINUED | OUTPATIENT
Start: 2020-04-17 | End: 2020-04-19 | Stop reason: HOSPADM

## 2020-04-17 RX ORDER — ONDANSETRON 2 MG/ML
4 INJECTION INTRAMUSCULAR; INTRAVENOUS EVERY 6 HOURS PRN
Status: DISCONTINUED | OUTPATIENT
Start: 2020-04-17 | End: 2020-04-19 | Stop reason: HOSPADM

## 2020-04-17 RX ORDER — TRAZODONE HYDROCHLORIDE 100 MG/1
100 TABLET ORAL EVERY EVENING
Status: DISCONTINUED | OUTPATIENT
Start: 2020-04-17 | End: 2020-04-19 | Stop reason: HOSPADM

## 2020-04-17 RX ORDER — LEVOFLOXACIN 500 MG/1
500 TABLET, FILM COATED ORAL DAILY
Status: DISCONTINUED | OUTPATIENT
Start: 2020-04-18 | End: 2020-04-19 | Stop reason: HOSPADM

## 2020-04-17 RX ORDER — ONDANSETRON 4 MG/1
4 TABLET, ORALLY DISINTEGRATING ORAL EVERY 6 HOURS PRN
Status: DISCONTINUED | OUTPATIENT
Start: 2020-04-17 | End: 2020-04-19 | Stop reason: HOSPADM

## 2020-04-17 RX ORDER — HYDROCODONE BITARTRATE AND ACETAMINOPHEN 5; 325 MG/1; MG/1
1-2 TABLET ORAL EVERY 4 HOURS PRN
Status: DISCONTINUED | OUTPATIENT
Start: 2020-04-17 | End: 2020-04-18

## 2020-04-17 RX ADMIN — SODIUM CHLORIDE: 9 INJECTION, SOLUTION INTRAVENOUS at 15:34

## 2020-04-17 RX ADMIN — TRAZODONE HYDROCHLORIDE 100 MG: 100 TABLET ORAL at 20:28

## 2020-04-17 RX ADMIN — ACETAMINOPHEN 650 MG: 325 TABLET ORAL at 20:33

## 2020-04-17 RX ADMIN — CARVEDILOL 3.12 MG: 3.12 TABLET, FILM COATED ORAL at 20:28

## 2020-04-17 RX ADMIN — IOPAMIDOL 135 ML: 755 INJECTION, SOLUTION INTRAVASCULAR at 11:21

## 2020-04-17 RX ADMIN — GABAPENTIN 900 MG: 300 CAPSULE ORAL at 20:28

## 2020-04-17 ASSESSMENT — PAIN SCALES - GENERAL: PAINLEVEL: MILD PAIN (2)

## 2020-04-17 ASSESSMENT — MIFFLIN-ST. JEOR: SCORE: 1858.02

## 2020-04-17 NOTE — PROGRESS NOTES
"UROLOGIC DIAGNOSES:       CURRENT INTERVENTIONS:       HISTORY:     Patient with history of prostate cancer on ASDI. Called clinic on 04/14/2020 with complaints of \"cyst\" enlarging in the perineum. U/s noted fat in scrotum, no testicular infection, possible fluid collection at the base of the scrotum. Patient started antibiotics that day.   Presented to the [facility] ED and urology was called but intervention was deferred.  Patient presents to the clinic today. Notes increase in size.     On physical exam, firm area noted but fluctuant area noted posteriorly.   CT scan was obtained.   This demonstrated a fat containing hernia anteriorly as well as fluid collection posterior to the hernia (closer to the perineum).     We discussed options including I and D, also general surgery consult given fat containing hernia.       PAST MEDICAL HISTORY:   Past Medical History:   Diagnosis Date     ADHD (attention deficit hyperactivity disorder)      Aneurysm artery, iliac (H)     father had AAA surgery     Anxiety      Atrial fibrillation (H) 5/9/2011     Bicuspid aortic valve      Coronary artery disease     ablation 2011     Depressive disorder      Diverticulitis      Gastro-oesophageal reflux disease      Hearing loss      History of spinal cord injury      Hypertension      Other chronic pain      Polymyalgia rheumatica (H)      Prostate infection      Restless legs      S/P nasal septoplasty     1998     Seasonal affective disorder (H)      Sleep apnea      Tachycardia     exercise initiated       PAST SURGICAL HISTORY:   Past Surgical History:   Procedure Laterality Date     BACK SURGERY      cervical fusion 2005     BIOPSY ARTERY TEMPORAL Left 4/4/2019    Procedure: Left Temporal Artery Biopsy;  Surgeon: Bette Curry MD;  Location: [facility] OR     BLEPHAROPLASTY BILATERAL  2/27/2012    Procedure:BLEPHAROPLASTY BILATERAL; BILATERAL UPPER LID BLEPHAROPLASTY ; Surgeon:JESSE LINDSEY; Location:[facility]     CARDIAC SURGERY   " "   stent 2003     CYSTOSCOPY       ORTHOPEDIC SURGERY      bilat hip replaced 2008,2009     PROSTATE SURGERY         FAMILY HISTORY:   Family History   Problem Relation Age of Onset     Arthritis Mother      Hypertension Mother      Hypertension Father      Heart Failure Father      Heart Disease Maternal Grandfather      Heart Disease Paternal Grandmother        SOCIAL HISTORY:   Social History     Tobacco Use     Smoking status: Never Smoker     Smokeless tobacco: Never Used   Substance Use Topics     Alcohol use: Yes     Comment: 1-2 alcoholic beverages per month       Current Outpatient Medications   Medication     Amphetamine-Dextroamphetamine (ADDERALL PO)     apixaban ANTICOAGULANT (ELIQUIS) 5 MG tablet     carvedilol (COREG) 3.125 MG tablet     gabapentin (NEURONTIN) 300 MG capsule     leflunomide (ARAVA) 20 MG tablet     levofloxacin (LEVAQUIN) 500 MG tablet     sildenafil (VIAGRA) 100 MG cap/tab     traZODone (DESYREL) 100 MG tablet     carvedilol (COREG) 6.25 MG tablet     diazepam (VALIUM) 5 MG tablet     gabapentin (NEURONTIN) 800 MG tablet     No current facility-administered medications for this visit.          PHYSICAL EXAM:    Ht 1.88 m (6' 2\")   Wt 104.3 kg (230 lb)   BMI 29.53 kg/m      HEENT: Normocephalic and atraumatic   Cardiac: Not done  Back/Flank: Not done  CNS/PNS: Not done  Respiratory: Normal non-labored breathing  Abdomen: Soft nontender and nondistended  Peripheral Vascular: Not done  Mental Status: Not done    Penis: Not done  Scrotal Skin: erythema noted   Testicles: palpated, non-tender  Epididymis: Not done  See physical exam above     Cystoscopy: Not done    Imaging: None    Urinalysis: UA RESULTS:  Recent Labs   Lab Test 09/19/12  0959   COLOR Yellow   APPEARANCE Clear   URINEGLC Negative   URINEBILI Negative   URINEKETONE Negative   SG 1.016   UBLD Negative   URINEPH 5.0   PROTEIN Negative   NITRITE Negative   LEUKEST Small*   RBCU 0   WBCU 2       PSA:     Post Void Residual: "     Other labs: None today      IMPRESSION:  75 y/o M with fat containing hernia on the left as well as posterior scrotal abscess     PLAN:  Refer to ED today for possible I and D this evening   Would appreciate general surgery consult for hernia     Total Time: 25 minutes                                       Total in Consultation: greater than 50%

## 2020-04-17 NOTE — PLAN OF CARE
Activity: Up ad colin, ambulates to the restroom.  Neuros: A&O x4, neuros intact. Denies numbness and tingling.  Cardiac: WDL, denies chest pain.  Respiratory: WDL, stable on RA, denies SOB.  GI: +BS, +flatus, pt reports BM this morning.  : Voiding, not saving.  Diet: Regular, NPO at midnight for I&D tomorrow.  Skin/Incsions: Warm, dry, intact. Scrotal abscess.  Lines/Drains: R PIV infusing MIVF @ 50 mL/hr.  Labs: Reviewed.  Pain/nausea:  Denies pain and nausea.  New changes this shift:  Pt arrived from clinic for an I&D of scrotal abscess. Procedure postponed until tomorrow due to pt taking morning dose of eliquis.  Plan: Continue to monitor and follow POC.    Observation goals:  -diagnostic tests and consults completed and resulted - goal not met   -vital signs normal or at patient baseline - goal met  -adequate pain control on oral analgesics - goal met  -infection is improving - goal not met  -I&D of abscess - goal not met

## 2020-04-17 NOTE — H&P
Addendum 15:44:  Patient is nontoxic - afebrile without leukocytosis or significant cellulitis.  Will continue levaquin and plan for surgical I&D tomorrow morning with Dr. Lopez given that his eliquis hasn't been held for 24 hours.     - Regular diet now  - NPO p MN  - Discussed with Dr. Lopez and Dr. Landeros.     [facility]  [phone]    Urology Admission History and Physical    Patient: Cy Seymour    MRN# [id number]   YOB: 1945       Date of Admission: 4/17/2020    Primary care provider: Lisa Mcleod             Chief Complaint:   Scrotal abscess    History is obtained from the patient and review of records          History of Present Illness:   Cy Seymour is a 74 year old male with PMH significant for HTN, CAD (s/p PCI 2003), afib (on eliquis and carvedilol), polymyalgia rheumatica, and Rebecca 3+3 prostate cancer (biopsy 8/2018) on active surveillance, as well as prostatic hypertrophy (not on alpha blocker).  He was seen in the [facility] ED on 4/15 with a scrotal abscess vs. Phlegmon and was discharged on levaquin per Urology recs.  Today he was seen in Dr. Lopze's clinic with a persistent fluctuant perineal mass and a CT was obtained showing:  IMPRESSION:   1. Abscess in low posterior left scrotal wall, with associated scrotal  skin thickening and perineal edema. No soft tissue emphysema.  2. Anterior to the fluid collection is a moderate-large fat-containing  left inguinal hernia.     Given this, the patient was direct-admitted to [facility] for possible I&D and to obtain a general surgery consult. The patient reports pain 3-4/10 in setting of enlarging perineal mass.     The patient denies fevers, chills, nausea, emesis, dysuria, hematuria, difficulty voiding.  He denies flank pain.  He is having left groin pain.  He is NPO but has been taking his medications as scheduled, thus his last dose of eliquis was this morning.          Past Medical  History:     Past Medical History:   Diagnosis Date     ADHD (attention deficit hyperactivity disorder)      Aneurysm artery, iliac (H)     father had AAA surgery     Anxiety      Atrial fibrillation (H) 5/9/2011     Bicuspid aortic valve      Coronary artery disease     ablation 2011     Depressive disorder      Diverticulitis      Gastro-oesophageal reflux disease      Hearing loss      History of spinal cord injury      Hypertension      Other chronic pain      Polymyalgia rheumatica (H)      Prostate infection      Restless legs      S/P nasal septoplasty     1998     Seasonal affective disorder (H)      Sleep apnea      Tachycardia     exercise initiated            Past Surgical History:     Past Surgical History:   Procedure Laterality Date     BACK SURGERY      cervical fusion 2005     BIOPSY ARTERY TEMPORAL Left 4/4/2019    Procedure: Left Temporal Artery Biopsy;  Surgeon: Bette Curry MD;  Location: [facility] OR     BLEPHAROPLASTY BILATERAL  2/27/2012    Procedure:BLEPHAROPLASTY BILATERAL; BILATERAL UPPER LID BLEPHAROPLASTY ; Surgeon:JESSE LINDSEY; [facility]     CARDIAC SURGERY      stent 2003     CYSTOSCOPY       ORTHOPEDIC SURGERY      bilat hip replaced 2008,2009     PROSTATE SURGERY              Social History:     Social History     Tobacco Use     Smoking status: Never Smoker     Smokeless tobacco: Never Used   Substance Use Topics     Alcohol use: Yes     Comment: 1-2 alcoholic beverages per month            Family History:     Family History   Problem Relation Age of Onset     Arthritis Mother      Hypertension Mother      Hypertension Father      Heart Failure Father      Heart Disease Maternal Grandfather      Heart Disease Paternal Grandmother             Immunizations:     VACCINE/DOSE   Diptheria   DPT   DTAP   HBIG   Hepatitis A   Hepatitis B   HIB   Influenza   Measles   Meningococcal   MMR   Mumps   Pneumococcal   Polio   Rubella   Small Pox   TDAP   Varicella   Zoster             Allergies:     Allergies   Allergen Reactions     Hmg-Coa-R Inhibitors Muscle Pain (Myalgia)     Other reaction(s): Myalgia     Oxycodone Other (See Comments)     Other reaction(s): Hallucinations  Hallucinations.     Sulfasalazine      Sulfa Drugs Rash            Medications:     No current facility-administered medications for this encounter.              Review of Systems:   The review of systems was positive for the findings as above in the HPI. Otherwise 10-point ros was negative.         Physical Exam:     Patient Vitals for the past 24 hrs:   BP Pulse Resp SpO2   04/17/20 1259 (!) 129/97 87 16 96 %       GENERAL PHYSICAL EXAM:   Vitals: BP (!) 129/97   Pulse 87   Resp 16   SpO2 96%   There is no height or weight on file to calculate BMI.    GENERAL: Well groomed, well developed, well nourished male in NAD.  HEAD: Normocephalic. No masses, lesions, tenderness or abnormalities  NECK: Neck supple. No adenopathy. Thyroid symmetric, normal size  RESPIRATORY:  No increased respiratory effort.   GI: Soft,  ND, no palpable masses.  + mass and very mild tenderness left groin without overlying erythema, crepitus, fluctuance.   No CVAT bilaterally.  MS: Gait normal, normal muscle tone  SKIN: Warm to touch, dry.    HEMATOLOGIC/LYMPHATIC/IMMUNOLOGIC: normal ant/post cervical, supraclavicular and inguinal nodes. No LE edema.  NEURO: Alert and oriented x 3.  PSYCH: Normal mood and affect, pleasant and agreeable during interview and exam.     :      Inguinal: + LIH not reduced d/t pain.       Circumcised penis, no penile plaques or lesions. Orthotopic location of the urethral meatus.       Scrotum normal.      Testicles wnl - nontender      + ~5cm fluctuant tender mass left perineum leading into dependent hemiscrotum.        No significant erythema.  No crepitus    FAWAD:     Deferred         Data:   All laboratory data reviewed    Lab Results   Component Value Date    PSA 3.57 04/06/2018    PSA 3.38 09/06/2016    PSA  6.77 04/04/2015    PSA 8.59 01/21/2015    PSA 2.94 09/19/2012    PSA 0.84 07/07/2009     Recent Labs   Lab 04/15/20  1250   WBC 10.9   HGB 12.0*        Recent Labs   Lab 04/15/20  1250      POTASSIUM 3.8   CHLORIDE 106   CO2 25   BUN 15   CR 0.81   GLC 87   ABDULKADIR 8.8     No lab results found in last 7 days.    Invalid input(s): URINEBLOOD  No results found for this or any previous visit.    Results for orders placed or performed in visit on 04/17/20   CT Abdomen Pelvis w Contrast     Status: Abnormal   Result Value Ref Range    Radiologist flags Scrotal abscess. (Urgent)     Narrative    EXAMINATION: CT ABDOMEN PELVIS W CONTRAST, 4/17/2020 11:43 AM    TECHNIQUE:  Helical CT images of the abdomen and pelvis were obtained  with IV contrast. Contrast dose: Isovue 370 135cc.    COMPARISON: Ultrasound 4/14/2020.    PROVIDED HISTORY: possible hernia vs scrotal abscess, unclear origin;  Scrotal abscess.    FINDINGS:  LOWER THORAX: Mild dependent atelectasis.    ABDOMEN/PELVIS:  HEPATOBILIARY: No suspicious liver lesion. 10 mm cyst in segment 4A.  Gallbladder is unremarkable. No biliary ductal dilatation.    PANCREAS: Pancreas is unremarkable. No pancreatic ductal dilatation.    SPLEEN: Unremarkable.    ADRENAL GLANDS: Unremarkable.    URINARY TRACT: Small hypodensities in the renal cortices, nonspecific,  suggestive of small cysts. No hydronephrosis. Significant obscuration  of the bladder and pelvic organs by beam hardening artifact from the  hips. Visualized portion of the bladder is unremarkable.    REPRODUCTIVE ORGANS/PERITONEUM: Moderate-large left inguinal hernia  containing peritoneal fat. Posteroinferior to the hernia sac, in the  low left scrotal wall, there is a rim-enhancing fluid collection  measuring 7.8 x 2.6 x 2.0 cm, with associated scrotal wall thickening.  Mild edema in the perineum, greater on the left. No soft tissue  emphysema.      GASTROINTESTINAL TRACT: Normal caliber of the small and  large bowel.  Normal appendix. Colonic diverticulosis without evidence of  diverticulitis.    LYMPH NODES: No enlarged lymph nodes.    VESSELS: Normal caliber of the abdominal aorta. Mild atherosclerotic  calcifications.    MUSCULOSKELETAL: Postsurgical changes of bilateral total hip  arthroplasties. Multilevel degenerative changes in the spine.      Impression    IMPRESSION:   1. Abscess in low posterior left scrotal wall, with associated scrotal  skin thickening and perineal edema. No soft tissue emphysema.  2. Anterior to the fluid collection is a moderate-large fat-containing  left inguinal hernia.     [Urgent Result: Scrotal abscess.]    Finding was identified on 4/17/2020 11:47 AM.     Dr. Lopez contacted the reading room and discussed findings with Dr. Allen at 4/17/2020 11:47 AM and verbalized understanding of the  urgent finding.    I have personally reviewed the examination and initial interpretation  and I agree with the findings.    JAJA RICKS MD     EXAMINATION: CT ABDOMEN PELVIS W CONTRAST, 4/17/2020 11:43 AM     TECHNIQUE:  Helical CT images of the abdomen and pelvis were obtained  with IV contrast. Contrast dose: Isovue 370 135cc.     COMPARISON: Ultrasound 4/14/2020.     PROVIDED HISTORY: possible hernia vs scrotal abscess, unclear origin;  Scrotal abscess.     FINDINGS:  LOWER THORAX: Mild dependent atelectasis.     ABDOMEN/PELVIS:  HEPATOBILIARY: No suspicious liver lesion. 10 mm cyst in segment 4A.  Gallbladder is unremarkable. No biliary ductal dilatation.     PANCREAS: Pancreas is unremarkable. No pancreatic ductal dilatation.     SPLEEN: Unremarkable.     ADRENAL GLANDS: Unremarkable.     URINARY TRACT: Small hypodensities in the renal cortices, nonspecific,  suggestive of small cysts. No hydronephrosis. Significant obscuration  of the bladder and pelvic organs by beam hardening artifact from the  hips. Visualized portion of the bladder is unremarkable.     REPRODUCTIVE  ORGANS/PERITONEUM: Moderate-large left inguinal hernia  containing peritoneal fat. Posteroinferior to the hernia sac, in the  low left scrotal wall, there is a rim-enhancing fluid collection  measuring 7.8 x 2.6 x 2.0 cm, with associated scrotal wall thickening.  Mild edema in the perineum, greater on the left. No soft tissue  emphysema.       GASTROINTESTINAL TRACT: Normal caliber of the small and large bowel.  Normal appendix. Colonic diverticulosis without evidence of  diverticulitis.     LYMPH NODES: No enlarged lymph nodes.     VESSELS: Normal caliber of the abdominal aorta. Mild atherosclerotic  calcifications.     MUSCULOSKELETAL: Postsurgical changes of bilateral total hip  arthroplasties. Multilevel degenerative changes in the spine.                                                                  IMPRESSION:   1. Abscess in low posterior left scrotal wall, with associated scrotal  skin thickening and perineal edema. No soft tissue emphysema.  2. Anterior to the fluid collection is a moderate-large fat-containing  left inguinal hernia.      [Urgent Result: Scrotal abscess.]            Impression and Plan:   Impression:   1) Scrotal abscess  2) left fat-containing inguinal hernia  3) Prostate cancer (lesli 6) on AS      Plan:   NEURO Pain - home gabapentin, tylenol,  Oxycodone/ IV dilaudid for breakthrough   CV Continue home carvedilol, hold eliquis   PULM Aggressive pulmonary toilet and I/S. CPAP use at night.    FEN/GI mIVF - maintenance NS at 100mL/hr  Diet NPO  Plan Gen-Surg consult for LI    No acute issues, good UOP   HEME Hgb as above, stable.  Continue to monitor. No transfusion indicated at this time   ID Afebrile  Antibiotics: Levaquin 500mg daily   ENDO/rheum Continue home leflunomide      ACTIVITY Up as tolerated   PPx SCDs, holding home eliquis   DISPO 7B pending surgical I&D          [facility]  April 17, 2020

## 2020-04-17 NOTE — CONSULTS
General Surgery Consultation Note    Cy Seymour  MRN: [id number]  male  74 year old    Reason for Consult: Inguinal Hernia     Chief Complaint:  Scrotal Abscess     Admitting Diagnosis: Scrotal abscess     Assessment & Plan:  74 year old male h/o afib on apixaban, HTN, CAD s/p stent placement, prostate hypertrophy admitted for incision and drainage of a recurrent left scrotal abscess with urology. General surgery consulted for evaluation of left fat containing inguinal hernia.     - Would not recommend inguinal hernia repair at this time given the infected field   - General surgery available for intraoperative concerns or questions during I&D   - Recommend f/u with general surgeon after resolution of abscess/infection for evaluation of elective L inguinal hernia repair     D/w Chief Dr Soni and staff Dr Andrew Melendez MD    HPI:  74 year old male w/ h/o afib on apixaban, HTN, CAD s/p stent placement, prostate cancer presented from urology clinic with 4-5 worsening left sided scrotal abscess despite starting oral Levaquin, planning for incision and drainage with urology today. Patient has an asymptomatic fat containing (confirmed w/ CT images) inguinal hernia on the left near the current scrotal abscess. This hernia was first noticed approximately 5 years ago by an urologist, otherwise, patient would have not been aware. It occasionally gives him issues with heavy activity though minimal pain. In the last week, he has noticed an increased bulge of his scrotum but possibility related to current infection. No bowel obstruction symptoms, having regular bowel movements. No fevers or chills. No previous hernia repairs or other abdominal surgeries.     Past Surgical History:   Cardiac stent 2003  Bleph   Cervical spine surgery  No abdominal surgeries     Past Medical History:   Iliac Aneurysm  Afib on AC  Bicuspid aortic valve   CAD s/p stent placement 2003  Diverticulitis   GERD  HTN  Prostate  infection   Inguinal hernia   ADHD    Social History:   Nonsmoker  No alcohol use  Lives in [place]     Family History:   HTN and Heart Disease     Allergies:   Allergies   Allergen Reactions     Hmg-Coa-R Inhibitors Muscle Pain (Myalgia)     Other reaction(s): Myalgia     Oxycodone Other (See Comments)     Other reaction(s): Hallucinations  Hallucinations.     Sulfasalazine      Sulfa Drugs Rash       Active Medications:   Reviewed, remarkable for apixaban, coreg, and recent subscription for levoquin     ROS:  Otherwise negative    Physical Examination:   Vital Signs: BP (!) 129/97   Pulse 87   Temp 97.5  F (36.4  C) (Oral)   Resp 16   SpO2 96%   GEN: NAD, comfortable appearing, alert and oriented   EENT: normal  Chest: NLB on RA, regular rate  Abdomen: soft, nontender, nondistended  Groin: Left groin with soft inguinal hernia, mild tenderness to palpation, unable to reduce d/t pain, Erythema along the posterior scrotum with a firm egg sized abscess, not draining   Extremities: WWP, no edema    Labs/Imaging/Other:  Reviewed labs from 4/15: BMP wnl w/ normal Cr 0.81, CBC w/ WBC 10.98   CT a/p:   1. Abscess in low posterior left scrotal wall, with associated scrotal  skin thickening and perineal edema. No soft tissue emphysema.  2. Anterior to the fluid collection is a moderate-large fat-containing  left inguinal hernia.

## 2020-04-17 NOTE — NURSING NOTE
"Return-perineal cyst/bulge   Recent ED visit for scrotal abscess     He has been on Levaquin and he took his 4th dose today.  The ER told him it needed to be drained in the OR.  He has had this before, this being his 4th time and is starting to get uncomfortable, about the size of a hotdog.    He denies any new or worse urinary sx or pains.    He takes 60mg Viagra  but is not sustainable for penetration.      Chief Complaint   Patient presents with     RECHECK     scrotal abscess        Height 1.88 m (6' 2\"), weight 104.3 kg (230 lb). Body mass index is 29.53 kg/m .    Patient Active Problem List   Diagnosis     Paroxysmal atrial fibrillation (H)     ADHD (attention deficit hyperactivity disorder)     CAD (coronary artery disease)     Insomnia     Major depression, recurrent (H)     BPH (benign prostatic hyperplasia)     Hypertension     Peripheral neuropathy     Preventative health care     Bilateral sensorineural hearing loss     Essential hypertension with goal blood pressure less than 140/90     Hyperlipidemia LDL goal <100       Allergies   Allergen Reactions     Hmg-Coa-R Inhibitors Muscle Pain (Myalgia)     Other reaction(s): Myalgia     Oxycodone Other (See Comments)     Other reaction(s): Hallucinations  Hallucinations.     Sulfasalazine      Sulfa Drugs Rash       Current Outpatient Medications   Medication Sig Dispense Refill     Amphetamine-Dextroamphetamine (ADDERALL PO) Take 10 mg by mouth        apixaban ANTICOAGULANT (ELIQUIS) 5 MG tablet Take 1 tablet (5 mg) by mouth 2 times daily 180 tablet 3     carvedilol (COREG) 3.125 MG tablet TK 1 T PO BID WITH MEALS  3     gabapentin (NEURONTIN) 300 MG capsule Take 900 mg by mouth At Bedtime   1     leflunomide (ARAVA) 20 MG tablet TK 1 T PO QD  0     levofloxacin (LEVAQUIN) 500 MG tablet Take 1 tablet (500 mg) by mouth daily 10 tablet 0     sildenafil (VIAGRA) 100 MG cap/tab Take 1 tablet (100 mg) by mouth daily as needed for erectile dysfunction Do not take " with NTG medication 10 tablet 1     traZODone (DESYREL) 100 MG tablet Take 100 mg by mouth       carvedilol (COREG) 6.25 MG tablet        diazepam (VALIUM) 5 MG tablet Take 1 tablet (5 mg) by mouth once as needed for anxiety (bring pill with you to your MRI) (Patient not taking: Reported on 4/17/2020) 1 tablet 0     gabapentin (NEURONTIN) 800 MG tablet Take 1,200 mg by mouth 3 times daily          Social History     Tobacco Use     Smoking status: Never Smoker     Smokeless tobacco: Never Used   Substance Use Topics     Alcohol use: Yes     Comment: 1-2 alcoholic beverages per month     Drug use: No       Chaitanya Gillis, EMT, EMT  4/17/2020  9:48 AM

## 2020-04-17 NOTE — LETTER
"4/17/2020       RE: Cy Seymour  [address]   [facility] [address]     Dear Colleague,    Thank you for referring your patient, Cy Seymour, to the [facility] UROLOGY AND INST FOR PROSTATE AND UROLOGIC CANCERS at [facility]. Please see a copy of my visit note below.    UROLOGIC DIAGNOSES:       CURRENT INTERVENTIONS:       HISTORY:     Patient with history of prostate cancer on ASDI. Called clinic on 04/14/2020 with complaints of \"cyst\" enlarging in the perineum. U/s noted fat in scrotum, no testicular infection, possible fluid collection at the base of the scrotum. Patient started antibiotics that day.   Presented to the [facility] ED and urology was called but intervention was deferred.  Patient presents to the clinic today. Notes increase in size.     On physical exam, firm area noted but fluctuant area noted posteriorly.   CT scan was obtained.   This demonstrated a fat containing hernia anteriorly as well as fluid collection posterior to the hernia (closer to the perineum).     We discussed options including I and D, also general surgery consult given fat containing hernia.       PAST MEDICAL HISTORY:   Past Medical History:   Diagnosis Date     ADHD (attention deficit hyperactivity disorder)      Aneurysm artery, iliac (H)     father had AAA surgery     Anxiety      Atrial fibrillation (H) 5/9/2011     Bicuspid aortic valve      Coronary artery disease     ablation 2011     Depressive disorder      Diverticulitis      Gastro-oesophageal reflux disease      Hearing loss      History of spinal cord injury      Hypertension      Other chronic pain      Polymyalgia rheumatica (H)      Prostate infection      Restless legs      S/P nasal septoplasty     1998     Seasonal affective disorder (H)      Sleep apnea      Tachycardia     exercise initiated       PAST SURGICAL HISTORY:   Past Surgical History:   Procedure Laterality Date     BACK SURGERY      " "cervical fusion 2005     BIOPSY ARTERY TEMPORAL Left 4/4/2019    Procedure: Left Temporal Artery Biopsy;  Surgeon: Bette Curry MD;  Location: [facility] OR     BLEPHAROPLASTY BILATERAL  2/27/2012    Procedure:BLEPHAROPLASTY BILATERAL; BILATERAL UPPER LID BLEPHAROPLASTY ; Surgeon:JESSE LINDSEY; Location: [facility]     CARDIAC SURGERY      stent 2003     CYSTOSCOPY       ORTHOPEDIC SURGERY      bilat hip replaced 2008,2009     PROSTATE SURGERY         FAMILY HISTORY:   Family History   Problem Relation Age of Onset     Arthritis Mother      Hypertension Mother      Hypertension Father      Heart Failure Father      Heart Disease Maternal Grandfather      Heart Disease Paternal Grandmother        SOCIAL HISTORY:   Social History     Tobacco Use     Smoking status: Never Smoker     Smokeless tobacco: Never Used   Substance Use Topics     Alcohol use: Yes     Comment: 1-2 alcoholic beverages per month       Current Outpatient Medications   Medication     Amphetamine-Dextroamphetamine (ADDERALL PO)     apixaban ANTICOAGULANT (ELIQUIS) 5 MG tablet     carvedilol (COREG) 3.125 MG tablet     gabapentin (NEURONTIN) 300 MG capsule     leflunomide (ARAVA) 20 MG tablet     levofloxacin (LEVAQUIN) 500 MG tablet     sildenafil (VIAGRA) 100 MG cap/tab     traZODone (DESYREL) 100 MG tablet     carvedilol (COREG) 6.25 MG tablet     diazepam (VALIUM) 5 MG tablet     gabapentin (NEURONTIN) 800 MG tablet     No current facility-administered medications for this visit.          PHYSICAL EXAM:    Ht 1.88 m (6' 2\")   Wt 104.3 kg (230 lb)   BMI 29.53 kg/m      HEENT: Normocephalic and atraumatic   Cardiac: Not done  Back/Flank: Not done  CNS/PNS: Not done  Respiratory: Normal non-labored breathing  Abdomen: Soft nontender and nondistended  Peripheral Vascular: Not done  Mental Status: Not done    Penis: Not done  Scrotal Skin: erythema noted   Testicles: palpated, non-tender  Epididymis: Not done  See physical exam above     Cystoscopy: Not " done    Imaging: None    Urinalysis: UA RESULTS:  Recent Labs   Lab Test 09/19/12  0959   COLOR Yellow   APPEARANCE Clear   URINEGLC Negative   URINEBILI Negative   URINEKETONE Negative   SG 1.016   UBLD Negative   URINEPH 5.0   PROTEIN Negative   NITRITE Negative   LEUKEST Small*   RBCU 0   WBCU 2       PSA:     Post Void Residual:     Other labs: None today      IMPRESSION:  75 y/o M with fat containing hernia on the left as well as posterior scrotal abscess     PLAN:  Refer to ED today for possible I and D this evening   Would appreciate general surgery consult for hernia     Total Time: 25 minutes                                       Total in Consultation: greater than 50%       Again, thank you for allowing me to participate in the care of your patient.      Sincerely,    Zulay Lopez MD

## 2020-04-17 NOTE — TELEPHONE ENCOUNTER
[facility] Call Center    Phone Message    May a detailed message be left on voicemail: yes     Reason for Call: Other: Melany from the Operations Center calling to request that a nurse call the ED where Christophe was sent and report about the patient.  The number to the ED is [phone]     Action Taken: Message routed to:  [facility] ([facility]):  Urology    Travel Screening: Not Applicable

## 2020-04-17 NOTE — PLAN OF CARE
Observation goals:  -diagnostic tests and consults completed and resulted - goal not met   -vital signs normal or at patient baseline - goal met  -adequate pain control on oral analgesics - goal met  -infection is improving - goal not met  -I&D of abscess - goal not met

## 2020-04-17 NOTE — DISCHARGE INSTRUCTIONS

## 2020-04-18 LAB
ANION GAP SERPL CALCULATED.3IONS-SCNC: 7 MMOL/L (ref 3–14)
BUN SERPL-MCNC: 13 MG/DL (ref 7–30)
CALCIUM SERPL-MCNC: 8.5 MG/DL (ref 8.5–10.1)
CHLORIDE SERPL-SCNC: 111 MMOL/L (ref 94–109)
CO2 SERPL-SCNC: 26 MMOL/L (ref 20–32)
CREAT SERPL-MCNC: 0.83 MG/DL (ref 0.66–1.25)
ERYTHROCYTE [DISTWIDTH] IN BLOOD BY AUTOMATED COUNT: 14.2 % (ref 10–15)
GFR SERPL CREATININE-BSD FRML MDRD: 87 ML/MIN/{1.73_M2}
GLUCOSE BLDC GLUCOMTR-MCNC: 100 MG/DL (ref 70–99)
GLUCOSE SERPL-MCNC: 96 MG/DL (ref 70–99)
GRAM STN SPEC: ABNORMAL
GRAM STN SPEC: ABNORMAL
HCT VFR BLD AUTO: 37 % (ref 40–53)
HGB BLD-MCNC: 11.1 G/DL (ref 13.3–17.7)
MCH RBC QN AUTO: 27.1 PG (ref 26.5–33)
MCHC RBC AUTO-ENTMCNC: 30 G/DL (ref 31.5–36.5)
MCV RBC AUTO: 91 FL (ref 78–100)
PLATELET # BLD AUTO: 285 10E9/L (ref 150–450)
POTASSIUM SERPL-SCNC: 3.7 MMOL/L (ref 3.4–5.3)
RBC # BLD AUTO: 4.09 10E12/L (ref 4.4–5.9)
SODIUM SERPL-SCNC: 143 MMOL/L (ref 133–144)
SPECIMEN SOURCE: ABNORMAL
WBC # BLD AUTO: 8.1 10E9/L (ref 4–11)

## 2020-04-18 PROCEDURE — 25000125 ZZHC RX 250: Performed by: NURSE ANESTHETIST, CERTIFIED REGISTERED

## 2020-04-18 PROCEDURE — 36415 COLL VENOUS BLD VENIPUNCTURE: CPT | Performed by: PHYSICIAN ASSISTANT

## 2020-04-18 PROCEDURE — 87102 FUNGUS ISOLATION CULTURE: CPT | Performed by: UROLOGY

## 2020-04-18 PROCEDURE — 87075 CULTR BACTERIA EXCEPT BLOOD: CPT | Performed by: UROLOGY

## 2020-04-18 PROCEDURE — 40000170 ZZH STATISTIC PRE-PROCEDURE ASSESSMENT II: Performed by: UROLOGY

## 2020-04-18 PROCEDURE — 25000128 H RX IP 250 OP 636: Performed by: NURSE ANESTHETIST, CERTIFIED REGISTERED

## 2020-04-18 PROCEDURE — 25000132 ZZH RX MED GY IP 250 OP 250 PS 637: Mod: GY | Performed by: STUDENT IN AN ORGANIZED HEALTH CARE EDUCATION/TRAINING PROGRAM

## 2020-04-18 PROCEDURE — 71000014 ZZH RECOVERY PHASE 1 LEVEL 2 FIRST HR: Performed by: UROLOGY

## 2020-04-18 PROCEDURE — 87070 CULTURE OTHR SPECIMN AEROBIC: CPT | Performed by: UROLOGY

## 2020-04-18 PROCEDURE — 25000132 ZZH RX MED GY IP 250 OP 250 PS 637: Mod: GY | Performed by: PHYSICIAN ASSISTANT

## 2020-04-18 PROCEDURE — 25800030 ZZH RX IP 258 OP 636: Performed by: NURSE ANESTHETIST, CERTIFIED REGISTERED

## 2020-04-18 PROCEDURE — 88304 TISSUE EXAM BY PATHOLOGIST: CPT | Performed by: UROLOGY

## 2020-04-18 PROCEDURE — G0378 HOSPITAL OBSERVATION PER HR: HCPCS

## 2020-04-18 PROCEDURE — 37000009 ZZH ANESTHESIA TECHNICAL FEE, EACH ADDTL 15 MIN: Performed by: UROLOGY

## 2020-04-18 PROCEDURE — 80048 BASIC METABOLIC PNL TOTAL CA: CPT | Performed by: PHYSICIAN ASSISTANT

## 2020-04-18 PROCEDURE — 87205 SMEAR GRAM STAIN: CPT | Performed by: UROLOGY

## 2020-04-18 PROCEDURE — 37000008 ZZH ANESTHESIA TECHNICAL FEE, 1ST 30 MIN: Performed by: UROLOGY

## 2020-04-18 PROCEDURE — 25000128 H RX IP 250 OP 636: Performed by: STUDENT IN AN ORGANIZED HEALTH CARE EDUCATION/TRAINING PROGRAM

## 2020-04-18 PROCEDURE — 27210794 ZZH OR GENERAL SUPPLY STERILE: Performed by: UROLOGY

## 2020-04-18 PROCEDURE — 85027 COMPLETE CBC AUTOMATED: CPT | Performed by: PHYSICIAN ASSISTANT

## 2020-04-18 PROCEDURE — 25000128 H RX IP 250 OP 636: Performed by: UROLOGY

## 2020-04-18 PROCEDURE — 87076 CULTURE ANAEROBE IDENT EACH: CPT | Performed by: UROLOGY

## 2020-04-18 PROCEDURE — 36000051 ZZH SURGERY LEVEL 2 1ST 30 MIN - UMMC: Performed by: UROLOGY

## 2020-04-18 PROCEDURE — 96361 HYDRATE IV INFUSION ADD-ON: CPT

## 2020-04-18 PROCEDURE — 00000146 ZZHCL STATISTIC GLUCOSE BY METER IP

## 2020-04-18 PROCEDURE — 36000053 ZZH SURGERY LEVEL 2 EA 15 ADDTL MIN - UMMC: Performed by: UROLOGY

## 2020-04-18 PROCEDURE — 25800030 ZZH RX IP 258 OP 636: Performed by: STUDENT IN AN ORGANIZED HEALTH CARE EDUCATION/TRAINING PROGRAM

## 2020-04-18 PROCEDURE — 25000128 H RX IP 250 OP 636: Performed by: ANESTHESIOLOGY

## 2020-04-18 RX ORDER — ONDANSETRON 4 MG/1
4 TABLET, ORALLY DISINTEGRATING ORAL EVERY 30 MIN PRN
Status: DISCONTINUED | OUTPATIENT
Start: 2020-04-18 | End: 2020-04-18 | Stop reason: HOSPADM

## 2020-04-18 RX ORDER — FENTANYL CITRATE 50 UG/ML
25-50 INJECTION, SOLUTION INTRAMUSCULAR; INTRAVENOUS
Status: DISCONTINUED | OUTPATIENT
Start: 2020-04-18 | End: 2020-04-18 | Stop reason: HOSPADM

## 2020-04-18 RX ORDER — SODIUM CHLORIDE, SODIUM LACTATE, POTASSIUM CHLORIDE, CALCIUM CHLORIDE 600; 310; 30; 20 MG/100ML; MG/100ML; MG/100ML; MG/100ML
INJECTION, SOLUTION INTRAVENOUS CONTINUOUS
Status: DISCONTINUED | OUTPATIENT
Start: 2020-04-18 | End: 2020-04-18 | Stop reason: HOSPADM

## 2020-04-18 RX ORDER — HYDROMORPHONE HYDROCHLORIDE 1 MG/ML
.3-.5 INJECTION, SOLUTION INTRAMUSCULAR; INTRAVENOUS; SUBCUTANEOUS EVERY 5 MIN PRN
Status: DISCONTINUED | OUTPATIENT
Start: 2020-04-18 | End: 2020-04-18 | Stop reason: HOSPADM

## 2020-04-18 RX ORDER — PROPOFOL 10 MG/ML
INJECTION, EMULSION INTRAVENOUS PRN
Status: DISCONTINUED | OUTPATIENT
Start: 2020-04-18 | End: 2020-04-18

## 2020-04-18 RX ORDER — PROPOFOL 10 MG/ML
INJECTION, EMULSION INTRAVENOUS CONTINUOUS PRN
Status: DISCONTINUED | OUTPATIENT
Start: 2020-04-18 | End: 2020-04-18

## 2020-04-18 RX ORDER — CEFAZOLIN SODIUM 2 G/100ML
2 INJECTION, SOLUTION INTRAVENOUS
Status: COMPLETED | OUTPATIENT
Start: 2020-04-18 | End: 2020-04-18

## 2020-04-18 RX ORDER — SODIUM CHLORIDE 9 MG/ML
INJECTION, SOLUTION INTRAVENOUS CONTINUOUS PRN
Status: DISCONTINUED | OUTPATIENT
Start: 2020-04-18 | End: 2020-04-18

## 2020-04-18 RX ORDER — HYDROCODONE BITARTRATE AND ACETAMINOPHEN 5; 325 MG/1; MG/1
1-2 TABLET ORAL EVERY 6 HOURS PRN
Status: DISCONTINUED | OUTPATIENT
Start: 2020-04-18 | End: 2020-04-19 | Stop reason: HOSPADM

## 2020-04-18 RX ORDER — CEFAZOLIN SODIUM 1 G/3ML
1 INJECTION, POWDER, FOR SOLUTION INTRAMUSCULAR; INTRAVENOUS SEE ADMIN INSTRUCTIONS
Status: DISCONTINUED | OUTPATIENT
Start: 2020-04-18 | End: 2020-04-18 | Stop reason: HOSPADM

## 2020-04-18 RX ORDER — BUPIVACAINE HYDROCHLORIDE 2.5 MG/ML
INJECTION, SOLUTION INFILTRATION; PERINEURAL PRN
Status: DISCONTINUED | OUTPATIENT
Start: 2020-04-18 | End: 2020-04-18 | Stop reason: HOSPADM

## 2020-04-18 RX ORDER — HYDROMORPHONE HCL/0.9% NACL/PF 0.2MG/0.2
0.2 SYRINGE (ML) INTRAVENOUS
Status: DISCONTINUED | OUTPATIENT
Start: 2020-04-18 | End: 2020-04-19 | Stop reason: HOSPADM

## 2020-04-18 RX ORDER — NALOXONE HYDROCHLORIDE 0.4 MG/ML
.1-.4 INJECTION, SOLUTION INTRAMUSCULAR; INTRAVENOUS; SUBCUTANEOUS
Status: DISCONTINUED | OUTPATIENT
Start: 2020-04-18 | End: 2020-04-19 | Stop reason: HOSPADM

## 2020-04-18 RX ORDER — ONDANSETRON 2 MG/ML
4 INJECTION INTRAMUSCULAR; INTRAVENOUS EVERY 30 MIN PRN
Status: DISCONTINUED | OUTPATIENT
Start: 2020-04-18 | End: 2020-04-18 | Stop reason: HOSPADM

## 2020-04-18 RX ORDER — LIDOCAINE HYDROCHLORIDE 20 MG/ML
INJECTION, SOLUTION INFILTRATION; PERINEURAL PRN
Status: DISCONTINUED | OUTPATIENT
Start: 2020-04-18 | End: 2020-04-18

## 2020-04-18 RX ORDER — FENTANYL CITRATE 50 UG/ML
INJECTION, SOLUTION INTRAMUSCULAR; INTRAVENOUS PRN
Status: DISCONTINUED | OUTPATIENT
Start: 2020-04-18 | End: 2020-04-18

## 2020-04-18 RX ADMIN — ACETAMINOPHEN 650 MG: 325 TABLET ORAL at 21:30

## 2020-04-18 RX ADMIN — FENTANYL CITRATE 25 MCG: 50 INJECTION, SOLUTION INTRAMUSCULAR; INTRAVENOUS at 14:05

## 2020-04-18 RX ADMIN — CARVEDILOL 3.12 MG: 3.12 TABLET, FILM COATED ORAL at 08:07

## 2020-04-18 RX ADMIN — HYDROCODONE BITARTRATE AND ACETAMINOPHEN 1 TABLET: 5; 325 TABLET ORAL at 17:26

## 2020-04-18 RX ADMIN — FENTANYL CITRATE 25 MCG: 50 INJECTION INTRAMUSCULAR; INTRAVENOUS at 15:32

## 2020-04-18 RX ADMIN — CARVEDILOL 3.12 MG: 3.12 TABLET, FILM COATED ORAL at 18:15

## 2020-04-18 RX ADMIN — FENTANYL CITRATE 25 MCG: 50 INJECTION, SOLUTION INTRAMUSCULAR; INTRAVENOUS at 14:28

## 2020-04-18 RX ADMIN — PROPOFOL 75 MCG/KG/MIN: 10 INJECTION, EMULSION INTRAVENOUS at 14:01

## 2020-04-18 RX ADMIN — SODIUM CHLORIDE: 9 INJECTION, SOLUTION INTRAVENOUS at 14:00

## 2020-04-18 RX ADMIN — PROPOFOL 10 MG: 10 INJECTION, EMULSION INTRAVENOUS at 14:07

## 2020-04-18 RX ADMIN — FENTANYL CITRATE 25 MCG: 50 INJECTION, SOLUTION INTRAMUSCULAR; INTRAVENOUS at 14:10

## 2020-04-18 RX ADMIN — HYDROCODONE BITARTRATE AND ACETAMINOPHEN 2 TABLET: 5; 325 TABLET ORAL at 23:49

## 2020-04-18 RX ADMIN — CEFAZOLIN 2 G: 10 INJECTION, POWDER, FOR SOLUTION INTRAVENOUS at 14:02

## 2020-04-18 RX ADMIN — FENTANYL CITRATE 25 MCG: 50 INJECTION INTRAMUSCULAR; INTRAVENOUS at 15:30

## 2020-04-18 RX ADMIN — LIDOCAINE HYDROCHLORIDE 100 MG: 20 INJECTION, SOLUTION INFILTRATION; PERINEURAL at 14:01

## 2020-04-18 RX ADMIN — PROPOFOL 100 MCG/KG/MIN: 10 INJECTION, EMULSION INTRAVENOUS at 14:16

## 2020-04-18 RX ADMIN — TRAZODONE HYDROCHLORIDE 100 MG: 100 TABLET ORAL at 21:29

## 2020-04-18 RX ADMIN — FENTANYL CITRATE 25 MCG: 50 INJECTION, SOLUTION INTRAMUSCULAR; INTRAVENOUS at 14:01

## 2020-04-18 RX ADMIN — FENTANYL CITRATE 25 MCG: 50 INJECTION INTRAMUSCULAR; INTRAVENOUS at 15:40

## 2020-04-18 RX ADMIN — LEFLUNOMIDE 20 MG: 20 TABLET ORAL at 08:07

## 2020-04-18 RX ADMIN — PROPOFOL 10 MG: 10 INJECTION, EMULSION INTRAVENOUS at 14:02

## 2020-04-18 RX ADMIN — SODIUM CHLORIDE: 9 INJECTION, SOLUTION INTRAVENOUS at 23:49

## 2020-04-18 RX ADMIN — FENTANYL CITRATE 25 MCG: 50 INJECTION INTRAMUSCULAR; INTRAVENOUS at 15:29

## 2020-04-18 RX ADMIN — HYDROCODONE BITARTRATE AND ACETAMINOPHEN 1 TABLET: 5; 325 TABLET ORAL at 15:59

## 2020-04-18 RX ADMIN — LEVOFLOXACIN 500 MG: 500 TABLET, FILM COATED ORAL at 08:07

## 2020-04-18 RX ADMIN — GABAPENTIN 900 MG: 300 CAPSULE ORAL at 21:30

## 2020-04-18 ASSESSMENT — LIFESTYLE VARIABLES: TOBACCO_USE: 0

## 2020-04-18 ASSESSMENT — ENCOUNTER SYMPTOMS: DYSRHYTHMIAS: 1

## 2020-04-18 NOTE — PLAN OF CARE
Vital signs:  Temp: 96.7  F (35.9  C) Temp src: Oral BP: 114/57 Pulse: 67   Resp: 18 SpO2: 94 % O2 Device: None (Room air)          Activity: Up independently.   Neuros: A&O x4.   Cardiac: WDL.  Respiratory: WDL on RA. Denies SOB.   GI/: Voiding spontaneously. +BS.   Diet: NPO since MN.   Lines: Left PIV infusing NS @50mL/hr.   Pain/nausea: Denies.   Plan: OR for I&D of scrotal abscess, no scheduled time yet.    Observation goals: Prior to Discharge   - Diagnostic tests and consults completed and resulted: Not met  - Vital signs normal or at patient baseline: Met.  - Adequate pain control on oral analgesics: Met. Discomfort, however, declined pain medication.   - Infection is improving: Not met.   - I&D of abscess: Not met. OR today.  Nurse to notify provider when observation goals have been met and patient is ready for discharge.

## 2020-04-18 NOTE — OP NOTE
OPERATIVE NOTE - 4/18/2020    PREOPERATIVE DIAGNOSIS: Perineal cyst  POSTOPERATIVE DIAGNOSIS: same    PROCEDURES PERFORMED:   1. Excision of perineal cyst    STAFF SURGEON: Cy Lopez  ASSISTANTS: Dmitriy Sousa MD; Mei Contreras MD  ANESTHESIA: MAC  ESTIMATED BLOOD LOSS: 5 mL.   IV FLUIDS: see dictated anesthesia record  COMPLICATIONS: None.   SPECIMEN:    Cyst fluid for culture    SIGNIFICANT FINDINGS:   Left perineal cyst - excised; Contents sent for culture    BRIEF OPERATIVE INDICATIONS: Cy Seymour is a 74 year old year old man who presented with left sided perineal swelling and discomfort.  In light of the findings he opted to proceed with the noted procedure.    Procedure in Detail: After informed consent was obtained, the patient was taken to the operating room and placed supine. Boots were applied and the genitals were prepped and draped in the dorsal lithotomy. A timeout was performed with the operating room staff.    A carvajal catheter was placed at the start of the case.    The case began with injection of 10 ml of 0.25% marcaine.  A 5 cm longitudinal incision was made along the left side of the scrotum.  We dissected down to the suspected cyst wall and then attempted to dissect around its contours.  No inflammation was noted of the surrounding tissues.  The cyst was walled off and well circumscribed.  While dissecting along the most anterior/cephalad aspect of the cyst the wall was punctured with thick white fluid noted within.  This fluid was sent for culture.  We continued our dissection around the entire cyst noting no involvement of the cord structures, fat containing hernia, testis or urethra.  The entire cyst was excised leaving no cyst wall behind.  The scrotum was inverted with no significant bleeding noted.  Scrotal tissues were healthy appearing.  The scrotum and perineum were thoroughly irrigated.  We closed the most posterior 2 cm of the incision with a horizontal  mattress stitch using 3-0 vicryl suture to facilitate packing.  The scrotum and perineum were packed with kerlix.  A scrotal support was placed       The patient was awoken from anesthesia without complication and transported to recovery in stable condition.     Postoperative Plan:  -Observation overnight  -Considering the size of the incision we will plan to teach the patient packing technique tomorrow and confirm his ability to successfully pack the wound  -Plan for BID packing changes at home  -Follow up in clinic with Dr Lopez     Rehab Medicine

## 2020-04-18 NOTE — ANESTHESIA PREPROCEDURE EVALUATION
"Anesthesia Pre-Procedure Evaluation    Patient: Cy Seymour   MRN:     [id number] Gender:   male   Age:    74 year old :      1945        Preoperative Diagnosis: Abscess of scrotum [N49.2]   Procedure(s):  INCISION AND DRAINAGE, ABSCESS, SCROTUM     LABS:  CBC:   Lab Results   Component Value Date    WBC 8.1 2020    WBC 9.5 2020    HGB 11.1 (L) 2020    HGB 11.3 (L) 2020    HCT 37.0 (L) 2020    HCT 36.9 (L) 2020     2020     2020     BMP:   Lab Results   Component Value Date     2020     2020    POTASSIUM 3.7 2020    POTASSIUM 3.7 2020    CHLORIDE 111 (H) 2020    CHLORIDE 109 2020    CO2 26 2020    CO2 24 2020    BUN 13 2020    BUN 15 2020    CR 0.83 2020    CR 0.87 2020    GLC 96 2020    GLC 89 2020     COAGS:   Lab Results   Component Value Date    INR 1.31 (H) 2020     POC:   Lab Results   Component Value Date     (H) 2020     OTHER:   Lab Results   Component Value Date    ABDULKADIR 8.5 2020    ALBUMIN 4.4 2009    PROTTOTAL 7.4 2009    ALT 20.0 2016    AST 22.0 2016    ALKPHOS 102 2009    BILITOTAL 0.5 2009    TSH 1.11 2013    CRP 7.1 2017    SED 20 2017        Preop Vitals    BP Readings from Last 3 Encounters:   20 (!) 142/92   04/15/20 (!) 133/93   19 (!) 167/82    Pulse Readings from Last 3 Encounters:   20 68   04/15/20 70   19 63      Resp Readings from Last 3 Encounters:   20 16   04/15/20 18   19 14    SpO2 Readings from Last 3 Encounters:   20 95%   04/15/20 96%   19 97%      Temp Readings from Last 1 Encounters:   20 37.1  C (98.7  F) (Oral)    Ht Readings from Last 1 Encounters:   20 1.88 m (6' 2\")      Wt Readings from Last 1 Encounters:   20 104.3 kg (230 lb)    Estimated body mass index is " "29.53 kg/m  as calculated from the following:    Height as of an earlier encounter on 4/17/20: 1.88 m (6' 2\").    Weight as of an earlier encounter on 4/17/20: 104.3 kg (230 lb).     LDA:  Peripheral IV 04/17/20 Right;Anterior Upper forearm (Active)   Site Assessment WDL 04/18/20 1226   Line Status Saline locked 04/18/20 1226   Phlebitis Scale 0-->no symptoms 04/18/20 1226   Infiltration Scale 0 04/18/20 1226   Infiltration Site Treatment Method  None 04/18/20 1226   Extravasation? No 04/18/20 1226   Number of days: 1       ETT (Active)   Number of days: 0        Past Medical History:   Diagnosis Date     ADHD (attention deficit hyperactivity disorder)      Aneurysm artery, iliac (H)     father had AAA surgery     Anxiety      Atrial fibrillation (H) 5/9/2011     Bicuspid aortic valve      Coronary artery disease     ablation 2011     Depressive disorder      Diverticulitis      Gastro-oesophageal reflux disease      Hearing loss      History of spinal cord injury      Hypertension      Other chronic pain      Polymyalgia rheumatica (H)      Prostate infection      Restless legs      S/P nasal septoplasty     1998     Seasonal affective disorder (H)      Sleep apnea      Tachycardia     exercise initiated      Past Surgical History:   Procedure Laterality Date     BACK SURGERY      cervical fusion 2005     BIOPSY ARTERY TEMPORAL Left 4/4/2019    Procedure: Left Temporal Artery Biopsy;  Surgeon: Bette Curry MD;  Location: [facility] OR     BLEPHAROPLASTY BILATERAL  2/27/2012    Procedure:BLEPHAROPLASTY BILATERAL; BILATERAL UPPER LID BLEPHAROPLASTY ; Surgeon:JESSE LINDSEY; Location:[facility]     CARDIAC SURGERY      stent 2003     CYSTOSCOPY       ORTHOPEDIC SURGERY      bilat hip replaced 2008,2009     PROSTATE SURGERY        Allergies   Allergen Reactions     Hmg-Coa-R Inhibitors Muscle Pain (Myalgia)     Other reaction(s): Myalgia     Oxycodone Other (See Comments)     Other reaction(s): " Hallucinations  Hallucinations.     Sulfasalazine      Sulfa Drugs Rash        Anesthesia Evaluation     . Pt has had prior anesthetic. Type: General and MAC    No history of anesthetic complications          ROS/MED HX    ENT/Pulmonary:     (+)sleep apnea, doesn't use CPAP , . .   (-) tobacco use   Neurologic: Comment: RLS    (+)other neuro recent severe HA    Cardiovascular: Comment: Iliac artery aneurysm. S/p ablation for atrial fibrillation 2011.    (+) hypertension-Peripheral Vascular Disease-- Other, CAD, --stent,03, 2012  2 . : . . . :. dysrhythmias a-fib, valvular problems/murmurs bicuspid aortic valve with mild stenosis:. Previous cardiac testing Echodate:7/2018results:Stress Testdate:8/2018 results:ECG reviewed date:5/20/18 results:SR with frequent PVCsCath date: 2015 results:         (-) taking anticoagulants/antiplatelets   METS/Exercise Tolerance:  >4 METS   Hematologic:     (+) History of Transfusion no previous transfusion reaction -      Musculoskeletal: Comment: Polymyalgia rheumatica        GI/Hepatic:  - neg GI/hepatic ROS       Renal/Genitourinary:  - ROS Renal section negative       Endo: Comment: Currently on Prednisone 40 mg for past two days        Psychiatric:  - neg psychiatric ROS       Infectious Disease:  - neg infectious disease ROS       Malignancy:      - no malignancy   Other:    (+) No chance of pregnancy C-spine cleared: N/A, H/O Chronic Pain,no other significant disability                        PHYSICAL EXAM:   Mental Status/Neuro: A/A/O   Airway: Facies: Feasible  Mallampati: II  Mouth/Opening: Full  TM distance: > 6 cm  Neck ROM: Full   Respiratory:    CV:    Comments:                      Assessment:   ASA SCORE: 2 emergent     Smoking Status:  Non-Smoker/Unknown   NPO Status: NPO Appropriate     Plan:   Anes. Type:  MAC   Pre-Medication: None   Induction:  IV (Standard)   Airway: Native Airway   Access/Monitoring: PIV   Maintenance: Balanced     Postop Plan:   Postop Pain:  Opioids  Postop Sedation/Airway: Not planned     PONV Management:   Adult Risk Factors:, Non-Smoker, Postop Opioids   Prevention: Ondansetron     CONSENT: Direct conversation   Plan and risks discussed with: Patient   Blood Products: Consent Deferred (Minimal Blood Loss)                   Fidelia Henao MD

## 2020-04-18 NOTE — ANESTHESIA CARE TRANSFER NOTE
[facility]    Procedure(s):  INCISION AND DRAINAGE of SCROTAL CYST    Diagnosis: Abscess of scrotum [N49.2]  Diagnosis Additional Information: No value filed.    Anesthesia Type:   No value filed.     Note:  Airway :Face Mask  Patient transferred to:PACU  Handoff Report: Identifed the Patient, Identified the Reponsible Provider, Reviewed the pertinent medical history, Discussed the surgical course, Reviewed Intra-OP anesthesia mangement and issues during anesthesia, Set expectations for post-procedure period and Allowed opportunity for questions and acknowledgement of understanding      Vitals: (Last set prior to Anesthesia Care Transfer)    CRNA VITALS  4/18/2020 1432 - 4/18/2020 1518      4/18/2020             Pulse:  67    Ht Rate:  68    SpO2:  97 %    Resp Rate (observed):  16                Electronically Signed By: Nisreen Aguirre MD  April 18, 2020  3:18 PM

## 2020-04-18 NOTE — PROGRESS NOTES
Observation goals: Prior to Discharge   - Diagnostic tests and consults completed and resulted: Not met  - Vital signs normal or at patient baseline: Met.  - Adequate pain control on oral analgesics: Met. Discomfort, however, declined pain medication.   - Infection is improving: Not met.   - I&D of abscess: Not met. OR today.  Nurse to notify provider when observation goals have been met and patient is ready for discharge.

## 2020-04-18 NOTE — ANESTHESIA POSTPROCEDURE EVALUATION
Anesthesia POST Procedure Evaluation    Patient: Cy Seymour   MRN:     [id number] Gender:   male   Age:    74 year old :      1945        Preoperative Diagnosis: Abscess of scrotum [N49.2]   Procedure(s):  INCISION AND DRAINAGE of SCROTAL CYST   Postop Comments: No value filed.     Anesthesia Type: No value filed.          Postop Pain Control: Uneventful            Sign Out: Well controlled pain   PONV: No   Neuro/Psych: Uneventful            Sign Out: Acceptable/Baseline neuro status   Airway/Respiratory: Uneventful            Sign Out: Acceptable/Baseline resp. status   CV/Hemodynamics: Uneventful            Sign Out: Acceptable CV status   Other NRE: NONE   DID A NON-ROUTINE EVENT OCCUR? No         Last Anesthesia Record Vitals:  CRNA VITALS  2020 1432 - 2020 1532      2020             NIBP Mean:  85    Ht Rate:  66          Last PACU Vitals:  Vitals Value Taken Time   /80 2020  3:50 PM   Temp 36.8  C (98.2  F) 2020  3:45 PM   Pulse 59 2020  3:50 PM   Resp 12 2020  3:45 PM   SpO2 96 % 2020  3:58 PM   Temp src     NIBP     Pulse     SpO2     Resp     Temp     Ht Rate 66 2020  3:14 PM   Temp 2     Vitals shown include unvalidated device data.      Electronically Signed By: Fidelia Henao MD, 2020, 3:59 PM

## 2020-04-18 NOTE — PROGRESS NOTES
Observation goals: Prior to Discharge   - Diagnostic tests and consults completed and resulted: Not met  - Vital signs normal or at patient baseline: Met.  - Adequate pain control on oral analgesics: Met. Discomfort, declined pain medication.   - Infection is improving: Not met.   - I&D of abscess: Not met. In pre-op @ time of writing.  Nurse to notify provider when observation goals have been met and patient is ready for discharge.

## 2020-04-18 NOTE — PROGRESS NOTES
Urology  Progress Note    - No acute events overnight  - Mild perineal tenderness  - Voiding spontaneously without issue    Exam  /81 (BP Location: Left arm)   Pulse 67   Temp 97.6  F (36.4  C) (Oral)   Resp 18   SpO2 94%   No acute distress  Non-labored breathing on RA  Abdomen soft, non-distended, non-tender  Left inguinal hernia. There is a 5 cm fluctuant tender mass on the left perineum extending into the left hemiscrotum. No overlying erythema. No crepitus.  Lower extremities non-edematous bilaterally    UOP x1/350    Labs  WBC   Date Value Ref Range Status   04/18/2020 8.1 4.0 - 11.0 10e9/L Final   04/17/2020 9.5 4.0 - 11.0 10e9/L Final   04/15/2020 10.9 4.0 - 11.0 10e9/L Final      Hemoglobin   Date Value Ref Range Status   04/18/2020 11.1 (L) 13.3 - 17.7 g/dL Final   04/17/2020 11.3 (L) 13.3 - 17.7 g/dL Final   04/15/2020 12.0 (L) 13.3 - 17.7 g/dL Final      Platelet Count   Date Value Ref Range Status   04/18/2020 285 150 - 450 10e9/L Final      Creatinine   Date Value Ref Range Status   04/18/2020 0.83 0.66 - 1.25 mg/dL Final   04/17/2020 0.87 0.66 - 1.25 mg/dL Final   04/15/2020 0.81 0.66 - 1.25 mg/dL Final      Potassium   Date Value Ref Range Status   04/18/2020 3.7 3.4 - 5.3 mmol/L Final        Culture Results: None    Drains/Lines/Tubes and Anticipated Removal:  None    Held Home Medications:  Adderall, Eliquis    Assessment/Plan  74 year old y/o male admitted directly on 4/17 for scrotal abscess. Plan for OR this morning for I&D.    Neuro: Tylenol prn for pain control, PTA gabapentin, trazodone  CV: HDS, PTA coreg  Pulm: incentive spirometry while awake  FEN/GI: NPO for procedure, MIVF @ 100/hr  Endo/Immune: Hx of polymyalgia rheumatica, PTA leflunomide  : OR for I&D of scrotal abscess this AM. General surgery was consulted given proximity of abscess to left fat-containing inguinal hernia who did not recommend repair given proximity to infected field. He will plan to follow-up with  general surgery as an outpatient once abscess has resolved.  Heme: No issues   ID: IV Levaquin perioperatively. Monitor for fevers/leukocytosis. Will transition to oral Abx for home-going.  Activity: Ad colin  PPx: SCDs  Dispo: 7B. Likely home today following procedure.    Seen and examined by chief resident. Will discuss with Dr. Lopez.    Mei Contreras MD, PGY-2  Urology Resident     Contacting the Urology Team     Please use the following job codes to reach the Urology Team. Note that you must use an in house phone and that job codes cannot receive text pages.     On weekdays, dial [phone] (or star-star-star 777 on the new [employer] telephones) then [phone] to reach the Adult Urology resident or PA on call    On weekdays, dial [phone] (or star-star-star 777 on the new [employer] telephones) then [phone] to reach the Pediatric Urology resident    On weeknights and weekends, dial [phone] (or star-star-star 777 on the new [employer] telephones) then [phone] to reach the Urology resident on call (for both Adult and Pediatrics)

## 2020-04-18 NOTE — PLAN OF CARE
Pt in OR for scrotal abscess I&D @ time of writing.    VSS on RA. Aox4 and able to make needs known. Perineal abcess consistent w/ previous characterizations. MIVF up to 100/hr from 50/hr. Potential for discharge this afternoon following procedure. Proceed w/ POC

## 2020-04-19 ENCOUNTER — PATIENT OUTREACH (OUTPATIENT)
Dept: CARE COORDINATION | Facility: CLINIC | Age: 75
End: 2020-04-19

## 2020-04-19 VITALS
WEIGHT: 238.1 LBS | DIASTOLIC BLOOD PRESSURE: 70 MMHG | BODY MASS INDEX: 30.57 KG/M2 | TEMPERATURE: 97.6 F | RESPIRATION RATE: 16 BRPM | OXYGEN SATURATION: 91 % | SYSTOLIC BLOOD PRESSURE: 107 MMHG | HEART RATE: 68 BPM

## 2020-04-19 LAB
ERYTHROCYTE [DISTWIDTH] IN BLOOD BY AUTOMATED COUNT: 14.4 % (ref 10–15)
HCT VFR BLD AUTO: 34.7 % (ref 40–53)
HGB BLD-MCNC: 10.3 G/DL (ref 13.3–17.7)
MCH RBC QN AUTO: 27.4 PG (ref 26.5–33)
MCHC RBC AUTO-ENTMCNC: 29.7 G/DL (ref 31.5–36.5)
MCV RBC AUTO: 92 FL (ref 78–100)
PLATELET # BLD AUTO: 265 10E9/L (ref 150–450)
RBC # BLD AUTO: 3.76 10E12/L (ref 4.4–5.9)
WBC # BLD AUTO: 8.8 10E9/L (ref 4–11)

## 2020-04-19 PROCEDURE — 85027 COMPLETE CBC AUTOMATED: CPT | Performed by: STUDENT IN AN ORGANIZED HEALTH CARE EDUCATION/TRAINING PROGRAM

## 2020-04-19 PROCEDURE — 96361 HYDRATE IV INFUSION ADD-ON: CPT

## 2020-04-19 PROCEDURE — 25000132 ZZH RX MED GY IP 250 OP 250 PS 637: Mod: GY | Performed by: STUDENT IN AN ORGANIZED HEALTH CARE EDUCATION/TRAINING PROGRAM

## 2020-04-19 PROCEDURE — 36415 COLL VENOUS BLD VENIPUNCTURE: CPT | Performed by: STUDENT IN AN ORGANIZED HEALTH CARE EDUCATION/TRAINING PROGRAM

## 2020-04-19 PROCEDURE — G0378 HOSPITAL OBSERVATION PER HR: HCPCS

## 2020-04-19 PROCEDURE — 25000132 ZZH RX MED GY IP 250 OP 250 PS 637: Mod: GY | Performed by: PHYSICIAN ASSISTANT

## 2020-04-19 RX ORDER — ACETAMINOPHEN 500 MG
500-1000 TABLET ORAL EVERY 6 HOURS PRN
Qty: 1 BOTTLE | Refills: 0 | Status: SHIPPED | OUTPATIENT
Start: 2020-04-19 | End: 2020-05-19

## 2020-04-19 RX ORDER — SENNA AND DOCUSATE SODIUM 50; 8.6 MG/1; MG/1
1 TABLET, FILM COATED ORAL AT BEDTIME
Qty: 30 TABLET | Refills: 0 | Status: SHIPPED | OUTPATIENT
Start: 2020-04-19 | End: 2020-04-24

## 2020-04-19 RX ORDER — AMOXICILLIN 250 MG
2 CAPSULE ORAL 2 TIMES DAILY PRN
Status: DISCONTINUED | OUTPATIENT
Start: 2020-04-19 | End: 2020-04-19 | Stop reason: HOSPADM

## 2020-04-19 RX ORDER — LEVOFLOXACIN 500 MG/1
500 TABLET, FILM COATED ORAL DAILY
Qty: 5 TABLET | Refills: 0 | Status: SHIPPED | OUTPATIENT
Start: 2020-04-20 | End: 2020-04-19

## 2020-04-19 RX ORDER — HYDROCODONE BITARTRATE AND ACETAMINOPHEN 5; 300 MG/1; MG/1
5 TABLET ORAL EVERY 6 HOURS PRN
Qty: 12 TABLET | Refills: 0 | Status: SHIPPED | OUTPATIENT
Start: 2020-04-19 | End: 2020-04-22

## 2020-04-19 RX ADMIN — SENNOSIDES AND DOCUSATE SODIUM 2 TABLET: 8.6; 5 TABLET ORAL at 05:55

## 2020-04-19 RX ADMIN — HYDROCODONE BITARTRATE AND ACETAMINOPHEN 2 TABLET: 5; 325 TABLET ORAL at 05:55

## 2020-04-19 RX ADMIN — CARVEDILOL 3.12 MG: 3.12 TABLET, FILM COATED ORAL at 08:15

## 2020-04-19 RX ADMIN — HYDROCODONE BITARTRATE AND ACETAMINOPHEN 2 TABLET: 5; 325 TABLET ORAL at 13:10

## 2020-04-19 RX ADMIN — APIXABAN 5 MG: 5 TABLET, FILM COATED ORAL at 13:10

## 2020-04-19 RX ADMIN — LEVOFLOXACIN 500 MG: 500 TABLET, FILM COATED ORAL at 08:15

## 2020-04-19 RX ADMIN — LEFLUNOMIDE 20 MG: 20 TABLET ORAL at 08:15

## 2020-04-19 NOTE — PLAN OF CARE
diagnostic tests and consults completed and resulted -results pending  -vital signs normal or at patient baseline -met  -adequate pain control on oral analgesics -met  -infection is improving -met  -I&D of abscess -met

## 2020-04-19 NOTE — PLAN OF CARE
Temp: 97  F (36.1  C) Temp src: Oral BP: 106/57 Pulse: 68 Heart Rate: 66 Resp: 17 SpO2: 92 % O2 Device: None (Room air) Oxygen Delivery: 2 LPM     Status: POD 1 I&D scrotal cyst  Neuro: WDL  GI/:  Voiding windy urine, passing flatus, requesting stool softeners  Skin: packing to scrotal area, ABD and mesh underwear in place  IV Access: NS at 100  Labs: reviewed  Pain:prn norco for periarea pain  Diet:regular  Activity: SBA  This shift: none  Plan:  Possible discharge today after teaching

## 2020-04-19 NOTE — PROGRESS NOTES
I saw Cy Seymour on rounds and discussed his care with my resident team.  He is s/p excision of scrotal infected cyst.  I performed a history and physical exam. I discussed my findings with my resident team.  I have reviewed their note and agree with the listed findings, assesment, and plan.    The plan is for discharge to home today.

## 2020-04-19 NOTE — PLAN OF CARE
VSS on RA.  AVS printed and reviewed in person, all questions answered.  All LDA's removed and documented.  All education documented and resolved.   Pt to discharge to home, in a car, with a ride from family.

## 2020-04-19 NOTE — PLAN OF CARE
Assumed Care: 1500 - 2330  Admission/Status: Scrotal cyst    VS: /71 (BP Location: Left arm)   Pulse 68   Temp 98.2  F (36.8  C)   Resp 16   Wt 108 kg (238 lb 1.6 oz)   SpO2 97%   BMI 30.57 kg/m    Neuro: Aox4.  Pupils PERRLA.  Cardiac: WDL.  No abnormal sounds heard.  HTN post op down trending.  Respiratory: Lungs clear and equal bilaterally.  Weaned from 2L post op to RA.  No dyspnea on exertion.  GI/: Abdomen soft/nontender.  Sounds audible and normoactive.  Nutrition: Regular diet.  Appetite adequate and tolerated well post op.  IV/Drains: Right PIV infusing.  Activity: Up to restroom.  Standby assist.  Pain: Scrotal pain when moving; covered by NORCO and Acetaminophen.  Skin: Warm, dry, and elastic.  Wound packing partially saturated.  Changing abdominal pads overnight as needed.  Labs: Unremarkable.    New this shift: Post op scrotal cyst drain.  Tolerating diet well.  Staying overnight for education and pain control.

## 2020-04-19 NOTE — PROGRESS NOTES
Care Coordinator Progress Note    Admission Date/Time:  4/17/2020  Attending MD:  Zulay Lopez*    Data  Chart reviewed, discussed with interdisciplinary team.   Patient was admitted for: Perineal abscess.    Concerns with insurance coverage for discharge needs: None.  Current Living Situation: Patient lives with spouse.  Support System: Supportive and Involved  Services Involved: no services involve prior to admission  Transportation at Discharge: Family or friend will provide  Transportation to Medical Appointments:   - Name of caregiver: spouse, Jeanine  Barriers to Discharge: no barriers identified    Coordination of Care and Referrals: Provided patient/family with options for Home Care.        Assessment  Patient is a 74 year old male who is s/p I&D to scrotal abscess and has BID wound packing and MD's would like home care arranged to assist with wound care.  Met with patient at bedside to introduce RNCC role and discuss discharge planning.  Patient lives with his spouse in [place].  His spouse is willing to be taught dressing changes.  Patient agreeable to home care.  Pt/family was provided with the Medicare Compare list for Home Care.  Discussed associated Medicare star ratings to assist with choice for referrals/discharge planning Yes  Education was given to pt/family that star ratings are updated/maintained by Medicare and can be reviewed by visiting www.medicare.gov Yes  After giving choice of agency patient chose to have a referral made to [facility](P: [phone], F: [phone]), referral made and orders placed.  Patients bedside RN will provide some dressing supplies for home.  Patient spouse will provide transportation to home at discharge.  Patient had no further questions regarding discharge plan.            Plan  Anticipated Discharge Date:  4/19/2020  Anticipated Discharge Plan:  Home with [facility] for RN visits    JAJA Gorman  Phone: [phone]  Pager:  [phone]  To contact weekend RNCC, dial [phone] and enter pager number [phone] at prompt. This pager can not be contacted by text page or outside line.     ADDENDUM: HC RN not available tomorrow to see the patient, but they are available today between 3-4pm.  They will call patients spouse to update them with this information.  Patient also made aware of plan for visit this afternoon.

## 2020-04-19 NOTE — PROGRESS NOTES
Urology  Progress Note    - No acute events overnight  - Feels better from yesterday  - Packing in place at perineum/scrotum  - Voiding spontaneously without issue  - Passing flatus    Exam  /57 (BP Location: Left arm)   Pulse 68   Temp 97  F (36.1  C) (Oral)   Resp 17   Wt 108 kg (238 lb 1.6 oz)   SpO2 92%   BMI 30.57 kg/m    No acute distress  Non-labored breathing on RA  Abdomen soft, non-distended, non-tender  Left inguinal hernia. There is a 5 cm fluctuant tender mass on the left perineum extending into the left hemiscrotum. No overlying erythema. No crepitus.  Lower extremities non-edematous bilaterally    /275    Labs  WBC   Date Value Ref Range Status   04/18/2020 8.1 4.0 - 11.0 10e9/L Final   04/17/2020 9.5 4.0 - 11.0 10e9/L Final   04/15/2020 10.9 4.0 - 11.0 10e9/L Final      Hemoglobin   Date Value Ref Range Status   04/18/2020 11.1 (L) 13.3 - 17.7 g/dL Final   04/17/2020 11.3 (L) 13.3 - 17.7 g/dL Final   04/15/2020 12.0 (L) 13.3 - 17.7 g/dL Final      Platelet Count   Date Value Ref Range Status   04/18/2020 285 150 - 450 10e9/L Final      Creatinine   Date Value Ref Range Status   04/18/2020 0.83 0.66 - 1.25 mg/dL Final   04/17/2020 0.87 0.66 - 1.25 mg/dL Final   04/15/2020 0.81 0.66 - 1.25 mg/dL Final      Potassium   Date Value Ref Range Status   04/18/2020 3.7 3.4 - 5.3 mmol/L Final        Culture Results:   GM+ cocci, speciation pending    Drains/Lines/Tubes and Anticipated Removal:  None    Held Home Medications:  AdderallLuciaquis    Assessment/Plan  74 year old y/o male admitted directly on 4/17 for scrotal abscess. POD 1 from I&D.    Neuro: Tylenol prn for pain control, PTA gabapentin, trazodone  CV: HDS, PTA coreg  Pulm: incentive spirometry while awake  FEN/GI: NPO for procedure, wean MIVF  Endo/Immune: Hx of polymyalgia rheumatica, PTA leflunomide  : BID packing to incision, went over packing instructions, nurse to teach packing again prior to discharge (around  noon)  Heme: No issues   ID:Oral Abx for home-going, PO levaquin  Activity: Ad colin  PPx: SCDs  Dispo: 7B. Likely home today following procedure.    Seen and examined by chief resident. Will discuss with Dr. Lopez.    Can Crews, Uro-3  Urology Resident     Contacting the Urology Team     Please use the following job codes to reach the Urology Team. Note that you must use an in house phone and that job codes cannot receive text pages.     On weekdays, dial [phone] (or star-star-star 777 on the new [employer] telephones) then [phone] to reach the Adult Urology resident or PA on call    On weekdays, dial 893 (or star-star-star 777 on the new [employer] telephones) then 0818 to reach the Pediatric Urology resident    On weeknights and weekends, dial 893 (or star-star-star 777 on the new [employer] telephones) then [phone] to reach the Urology resident on call (for both Adult and Pediatrics)

## 2020-04-20 NOTE — DISCHARGE SUMMARY
Discharge Summary     Cy Seymour MRN# [id number]   YOB: 1945 Age: 74 year old     Date of Admission:  4/17/2020  Date of Discharge::  4/19/2020  2:05 PM  Admitting Physician:  Zulay Lopez MD  Discharge Physician:  Mei Contreras MD  Primary Care Physician:         Lisa Mcleod          Admission Diagnoses:   Abscess of scrotum [N49.2]          Discharge Diagnosis:   Same as above         Procedures:    Procedure(s):  INCISION AND DRAINAGE of SCROTAL CYST        Non-operative procedures:   None performed          Consultations:   VASCULAR ACCESS CARE ADULT IP CONSULT         Imaging Studies:     Results for orders placed or performed in visit on 04/17/20   CT Abdomen Pelvis w Contrast     Value    Radiologist flags Scrotal abscess. (Urgent)    Narrative    EXAMINATION: CT ABDOMEN PELVIS W CONTRAST, 4/17/2020 11:43 AM    TECHNIQUE:  Helical CT images of the abdomen and pelvis were obtained  with IV contrast. Contrast dose: Isovue 370 135cc.    COMPARISON: Ultrasound 4/14/2020.    PROVIDED HISTORY: possible hernia vs scrotal abscess, unclear origin;  Scrotal abscess.    FINDINGS:  LOWER THORAX: Mild dependent atelectasis.    ABDOMEN/PELVIS:  HEPATOBILIARY: No suspicious liver lesion. 10 mm cyst in segment 4A.  Gallbladder is unremarkable. No biliary ductal dilatation.    PANCREAS: Pancreas is unremarkable. No pancreatic ductal dilatation.    SPLEEN: Unremarkable.    ADRENAL GLANDS: Unremarkable.    URINARY TRACT: Small hypodensities in the renal cortices, nonspecific,  suggestive of small cysts. No hydronephrosis. Significant obscuration  of the bladder and pelvic organs by beam hardening artifact from the  hips. Visualized portion of the bladder is unremarkable.    REPRODUCTIVE ORGANS/PERITONEUM: Moderate-large left inguinal hernia  containing peritoneal fat. Posteroinferior to the hernia sac, in the  low left scrotal wall, there is a  rim-enhancing fluid collection  measuring 7.8 x 2.6 x 2.0 cm, with associated scrotal wall thickening.  Mild edema in the perineum, greater on the left. No soft tissue  emphysema.      GASTROINTESTINAL TRACT: Normal caliber of the small and large bowel.  Normal appendix. Colonic diverticulosis without evidence of  diverticulitis.    LYMPH NODES: No enlarged lymph nodes.    VESSELS: Normal caliber of the abdominal aorta. Mild atherosclerotic  calcifications.    MUSCULOSKELETAL: Postsurgical changes of bilateral total hip  arthroplasties. Multilevel degenerative changes in the spine.      Impression    IMPRESSION:   1. Abscess in low posterior left scrotal wall, with associated scrotal  skin thickening and perineal edema. No soft tissue emphysema.  2. Anterior to the fluid collection is a moderate-large fat-containing  left inguinal hernia.     [Urgent Result: Scrotal abscess.]    Finding was identified on 4/17/2020 11:47 AM.     Dr. Lopez contacted the reading room and discussed findings with Dr. Allen at 4/17/2020 11:47 AM and verbalized understanding of the  urgent finding.    I have personally reviewed the examination and initial interpretation  and I agree with the findings.    JAJA RICKS MD            Medications Prior to Admission:     No medications prior to admission.            Discharge Medications:     Discharge Medication List as of 4/19/2020 12:25 PM      START taking these medications    Details   acetaminophen (TYLENOL) 500 MG tablet Take 1-2 tablets (500-1,000 mg) by mouth every 6 hours as needed for pain, Disp-1 Bottle,R-0, E-Prescribe      HYDROcodone-Acetaminophen (VICODIN) 5-300 MG TABS Take 5 mg of opiate by mouth every 6 hours as needed (pain), Disp-12 tablet,R-0, Local Print      SENNA-docusate sodium (SENNA S) 8.6-50 MG tablet Take 1 tablet by mouth At Bedtime for 5 days, Disp-30 tablet,R-0, E-Prescribe         CONTINUE these medications which have NOT CHANGED    Details    Amphetamine-Dextroamphetamine (ADDERALL PO) Take 10 mg by mouth daily as needed , Historical      apixaban ANTICOAGULANT (ELIQUIS) 5 MG tablet Take 1 tablet (5 mg) by mouth 2 times daily, Disp-180 tablet, R-3, E-Prescribe      carvedilol (COREG) 3.125 MG tablet TK 1 T PO BID WITH MEALS, R-3, Historical      gabapentin (NEURONTIN) 300 MG capsule Take 900 mg by mouth At Bedtime , R-1, Historical      leflunomide (ARAVA) 20 MG tablet TK 1 T PO QD, R-0, Historical      levofloxacin (LEVAQUIN) 500 MG tablet Take 1 tablet (500 mg) by mouth daily, Disp-10 tablet,R-0, E-Prescribe      sildenafil (VIAGRA) 100 MG cap/tab Take 1 tablet (100 mg) by mouth daily as needed for erectile dysfunction Do not take with NTG medication, Disp-10 tablet, R-1, E-Prescribe      traZODone (DESYREL) 100 MG tablet Take 100 mg by mouth every evening , Historical                    Brief History of Illness:   Cy Seymour is a 74 year old year old man who presented to an [facility] ED on 4/15 with left sided perineal swelling and discomfort and was found to have a scrotal abscess on CT. He was discharged home with PO Levaquin, however his pain worsened over the next several days. He was admitted directly to the urology service on 4/17 for planned I&D of scrotal abscess after a thorough discussion of the risks/benefits.           Hospital Course:   The patient was admitted to the hospital on 4/17 and started on IV levaquin. General surgery was consulted given the proximity of his abscess to his fat-containing inguinal hernia and they recommended outpatient follow-up for hernia repair once his infection had resolved. He had taken a dose of his Eliquis earlier in the day and the decision was made to delay his surgery until 4/18. On 4/18 he underwent excision of scrotal cyst/abscess. His scrotum was left open with packing. There were no intraoperative complications and he was transferred to the general care floor postoperatively.     On POD#1  patient was ambulating without assitance, tolerating the discharge diet, had pain controlled with PO medications to go home with, and requiring no IV medications or fluids. He was taught how to pack his scrotal wound. He was transitioned to oral antibiotics. Patient was discharged home with appropriate contact information, follow-up and instructions as seen below in the discharge paperwork.         Final Pathology Result:   Pending at time of discharge         Discharge Instructions and Follow-Up:     Discharge Procedure Orders   Home care nursing referral   Referral Priority: Routine Referral Type: Home Health Therapies & Aides   Number of Visits Requested: 1     Reason for your hospital stay   Order Comments: For surgery on your scrotum     Discharge Instructions   Order Comments: Activity  - No strenuous exercise for 2 weeks.  - No lifting, pushing, pulling more than 15 pounds for 3 week.   - Do not strain with bowel movements.  - Do not drive until you can press the brake pedal quickly and fully without pain.   - Do not operate a motor vehicle while taking narcotic pain medications.     Incisions  - Pack scrotal incision as directed twice per day  - You may shower and get incisions wet starting 48 hrs after surgery.  - Do not scrub incisions or submerge wounds (bath, pool, hot tub, etc) for 2 weeks.   - Your stitches will fall out on their own in 2-4 weeks  - Leave incision open to air.  Cover with gauze only if needed for comfort or to protect clothing from drainage.     Medications  - Do not take any additional Tylenol (acetaminophen) while using Percocet or Vicodin.  - Do not take more than 4,000mg of Tylenol (acetaminophen) in any 24 hour period, as this can cause liver damage.  - Take stool softeners such as Senna while you are using narcotics, but stop if you develop diarrhea.   - Wean yourself off of narcotic pain medications.     Follow-Up:  - Call or return sooner than your regularly scheduled visit if  you develop any of the following:  fever, uncontrolled pain, uncontrolled nausea or vomiting, as well as increased redness, swelling, or drainage from your wound.  You may call the Urology Clinic during daytime hours at [phone].  If after hours, call [phone] and ask to speak with the Urology resident on call.     MD face to face encounter   Order Comments: Documentation of Face to Face and Certification for Home Health Services    I certify that patient: Cy Seymour is under my care and that I, or a nurse practitioner or physician's assistant working with me, had a face-to-face encounter that meets the physician face-to-face encounter requirements with this patient on: 4/19/2020.    This encounter with the patient was in whole, or in part, for the following medical condition, which is the primary reason for home health care: scrotal abscess s/p I&D.    I certify that, based on my findings, the following services are medically necessary home health services: Nursing.    My clinical findings support the need for the above services because: Nurse is needed: To provide caregiver training to assist with: wound packing..    Further, I certify that my clinical findings support that this patient is homebound (i.e. absences from home require considerable and taxing effort and are for medical reasons or [facility] services or infrequently or of short duration when for other reasons) because: Requires assistance of another person or specialized equipment to access medical services because patient: Requires supervision of another for safe transfer...    Based on the above findings. I certify that this patient is confined to the home and needs intermittent skilled nursing care, physical therapy and/or speech therapy.  The patient is under my care, and I have initiated the establishment of the plan of care.  This patient will be followed by a physician who will periodically review the plan of  care.  Physician/Provider to provide follow up care: Lisa Mcleod    Attending hospital physician (the Medicare certified [facility] provider): Dr. Lopez  Physician Signature: See electronic signature associated with these discharge orders.  Date: 4/19/2020     Full Code     Order Specific Question Answer Comments   Code status determined by: Discussion with patient/legal decision maker             Discharge Disposition:   Discharged to Home      Condition at discharge: Good    --    Mei Contreras MD  PGY-2 Urology  Pager [phone]    7:28 AM, 4/20/2020

## 2020-04-21 DIAGNOSIS — L02.215 PERINEAL ABSCESS: Primary | ICD-10-CM

## 2020-04-21 LAB
BACTERIA SPEC CULT: ABNORMAL
SPECIMEN SOURCE: ABNORMAL

## 2020-04-21 RX ORDER — OXYCODONE HYDROCHLORIDE 5 MG/1
5 TABLET ORAL EVERY 6 HOURS PRN
Qty: 12 TABLET | Refills: 0 | Status: SHIPPED | OUTPATIENT
Start: 2020-04-21 | End: 2020-04-24

## 2020-04-21 NOTE — PROGRESS NOTES
Patient was called three times and no answer so post 24 hr DC follow up calls will be closed out    Elizabeth Lamar CMA   Post Hospital Discharge Team

## 2020-04-21 NOTE — ANESTHESIA POSTPROCEDURE EVALUATION
Anesthesia POST Procedure Evaluation    Patient: Cy Seymour   MRN:     [id number] Gender:   male   Age:    74 year old :      1945        Preoperative Diagnosis: Abscess of scrotum [N49.2]   Procedure(s):  INCISION AND DRAINAGE of SCROTAL CYST   Postop Comments: No value filed.     Anesthesia Type: No value filed.          Postop Pain Control: Uneventful            Sign Out: Well controlled pain   PONV: No   Neuro/Psych: Uneventful            Sign Out: Acceptable/Baseline neuro status   Airway/Respiratory: Uneventful            Sign Out: Acceptable/Baseline resp. status   CV/Hemodynamics: Uneventful            Sign Out: Acceptable CV status   Other NRE: NONE   DID A NON-ROUTINE EVENT OCCUR? No         Last Anesthesia Record Vitals:  CRNA VITALS  2020 1432 - 2020 1532      2020             NIBP Mean:  85    Ht Rate:  66          Last PACU Vitals:  Vitals Value Taken Time   /62 2020  8:43 AM   Temp 36.4  C (97.6  F) 2020  8:10 AM   Pulse 98 2020  8:43 AM   Resp 16 2020  8:10 AM   SpO2 97 % 2020  8:46 AM   Temp src     NIBP     Pulse     SpO2     Resp     Temp     Ht Rate 66 2020  3:14 PM   Temp 2     Vitals shown include unvalidated device data.      Electronically Signed By: Fidelia Henao MD, 2020, 2:56 PM

## 2020-04-22 LAB — COPATH REPORT: NORMAL

## 2020-04-25 LAB
BACTERIA SPEC CULT: ABNORMAL
Lab: ABNORMAL
SPECIMEN SOURCE: ABNORMAL

## 2020-04-28 ENCOUNTER — TELEPHONE (OUTPATIENT)
Dept: UROLOGY | Facility: CLINIC | Age: 75
End: 2020-04-28

## 2020-04-28 NOTE — TELEPHONE ENCOUNTER
Home care called to report  That he is having  More drainage today from wound. Patient feels it is more tender and swelling. He  Has been off the Levaquin for a few days now. Nurse and patient are  wondering  If he should have another course of the antibiotic Levaquin?

## 2020-04-28 NOTE — TELEPHONE ENCOUNTER
Per Dr Lopez  See in office for wound check on Friday . Will ask schedulers to set up . Patient informed to continue to monitor wound.Mallory Kenny LPN

## 2020-04-29 ENCOUNTER — PRE VISIT (OUTPATIENT)
Dept: UROLOGY | Facility: CLINIC | Age: 75
End: 2020-04-29

## 2020-04-29 ENCOUNTER — MEDICAL CORRESPONDENCE (OUTPATIENT)
Dept: HEALTH INFORMATION MANAGEMENT | Facility: CLINIC | Age: 75
End: 2020-04-29

## 2020-04-29 DIAGNOSIS — C61 PROSTATE CANCER (H): Primary | ICD-10-CM

## 2020-04-29 NOTE — TELEPHONE ENCOUNTER
Chief Complaint : Return In person     Hx/Sx: Scrotal wound/PSA    Records/Orders: PSA Ordered, Lab Apt scheduled.    Pt Contacted:Called 04/29/20 at 8:03 AM and confirmed lab and clinic Apt.    At Rooming: In Person       Pt was scheduled for 5/14 for '6 Mo PSA Check' virtual visit with Dr. Lopez, due to pt coming to clinic 5/1, he will have a PSA drawn at that time, and discuss the results in person so we cancelled the 5/14 Phone visit.    Chaitanya Gillis, EMT

## 2020-04-30 DIAGNOSIS — Z79.899 ENCOUNTER FOR LONG-TERM (CURRENT) USE OF OTHER MEDICATIONS: Primary | ICD-10-CM

## 2020-05-01 ENCOUNTER — OFFICE VISIT (OUTPATIENT)
Dept: UROLOGY | Facility: CLINIC | Age: 75
End: 2020-05-01
Payer: MEDICARE

## 2020-05-01 VITALS — BODY MASS INDEX: 30.54 KG/M2 | HEIGHT: 74 IN | WEIGHT: 238 LBS

## 2020-05-01 DIAGNOSIS — N49.2 SCROTAL ABSCESS: Primary | ICD-10-CM

## 2020-05-01 DIAGNOSIS — C61 PROSTATE CANCER (H): ICD-10-CM

## 2020-05-01 DIAGNOSIS — Z79.899 ENCOUNTER FOR LONG-TERM (CURRENT) USE OF OTHER MEDICATIONS: ICD-10-CM

## 2020-05-01 LAB
ALT SERPL W P-5'-P-CCNC: 20 U/L (ref 0–70)
AST SERPL W P-5'-P-CCNC: 15 U/L (ref 0–45)
PSA SERPL-MCNC: 2.98 UG/L (ref 0–4)

## 2020-05-01 ASSESSMENT — PAIN SCALES - GENERAL: PAINLEVEL: NO PAIN (0)

## 2020-05-01 ASSESSMENT — MIFFLIN-ST. JEOR: SCORE: 1894.31

## 2020-05-01 NOTE — LETTER
"5/1/2020       RE: Cy Seymour  [id number]   [facility] [address]     Dear Colleague,    Thank you for referring your patient, Cy Seymour, to the [facility] UROLOGY AND INST FOR PROSTATE AND UROLOGIC CANCERS at [facility]. Please see a copy of my visit note below.    UROLOGIC DIAGNOSES:       CURRENT INTERVENTIONS:       HISTORY:     Patient with history of prostate cancer on ASDI. Called clinic on 04/14/2020 with complaints of \"cyst\" enlarging in the perineum. U/s noted fat in scrotum, no testicular infection, possible fluid collection at the base of the scrotum. Patient started antibiotics that day.   Presented to the [facility] ED and urology was called but intervention was deferred.  Patient presents to the clinic today. Notes increase in size.     CT scan was obtained.   This demonstrated a fat containing hernia anteriorly as well as fluid collection posterior to the hernia (closer to the perineum).     Patient is s/p I and D of abovementioned infected scrotal cyst. Patient notes some drainage from the wound and is concerned about wound healing.     PSA was 2.98 today, history of elevated PSA, found to have [place] 3+3 on biopsy.           PAST MEDICAL HISTORY:   Past Medical History:   Diagnosis Date     ADHD (attention deficit hyperactivity disorder)      Aneurysm artery, iliac (H)     father had AAA surgery     Anxiety      Atrial fibrillation (H) 5/9/2011     Bicuspid aortic valve      Coronary artery disease     ablation 2011     Depressive disorder      Diverticulitis      Gastro-oesophageal reflux disease      Hearing loss      History of spinal cord injury      Hypertension      Other chronic pain      Polymyalgia rheumatica (H)      Prostate infection      Restless legs      S/P nasal septoplasty     1998     Seasonal affective disorder (H)      Sleep apnea      Tachycardia     exercise initiated       PAST SURGICAL HISTORY:   Past " "Surgical History:   Procedure Laterality Date     BACK SURGERY      cervical fusion 2005     BIOPSY ARTERY TEMPORAL Left 4/4/2019    Procedure: Left Temporal Artery Biopsy;  Surgeon: Bette Curry MD;  Location: [facility] OR     BLEPHAROPLASTY BILATERAL  2/27/2012    Procedure:BLEPHAROPLASTY BILATERAL; BILATERAL UPPER LID BLEPHAROPLASTY ; Surgeon:JESSE LINDSEY; Location:[facility]     CARDIAC SURGERY      stent 2003     CYSTOSCOPY       INCISION AND DRAINAGE ABSCESS SCROTUM, COMBINED N/A 4/18/2020    Procedure: INCISION AND DRAINAGE of SCROTAL CYST;  Surgeon: Cy Lopez MD;  Location: [facility] OR     ORTHOPEDIC SURGERY      bilat hip replaced 2008,2009     PROSTATE SURGERY         FAMILY HISTORY:   Family History   Problem Relation Age of Onset     Arthritis Mother      Hypertension Mother      Hypertension Father      Heart Failure Father      Heart Disease Maternal Grandfather      Heart Disease Paternal Grandmother        SOCIAL HISTORY:   Social History     Tobacco Use     Smoking status: Never Smoker     Smokeless tobacco: Never Used   Substance Use Topics     Alcohol use: Yes     Comment: 1-2 alcoholic beverages per month       Current Outpatient Medications   Medication     acetaminophen (TYLENOL) 500 MG tablet     Amphetamine-Dextroamphetamine (ADDERALL PO)     apixaban ANTICOAGULANT (ELIQUIS) 5 MG tablet     carvedilol (COREG) 3.125 MG tablet     gabapentin (NEURONTIN) 300 MG capsule     leflunomide (ARAVA) 20 MG tablet     levofloxacin (LEVAQUIN) 500 MG tablet     sildenafil (VIAGRA) 100 MG cap/tab     traZODone (DESYREL) 100 MG tablet     No current facility-administered medications for this visit.          PHYSICAL EXAM:    Ht 1.88 m (6' 2\")   Wt 108 kg (238 lb)   BMI 30.56 kg/m      HEENT: Normocephalic and atraumatic   Cardiac: Not done  Back/Flank: Not done  CNS/PNS: Not done  Respiratory: Normal non-labored breathing  Abdomen: Soft nontender and nondistended  Peripheral Vascular: Not done  Mental " Status: Not done    Penis: Not done  Scrotal Skin: erythema noted   Wound healing, some drainage noted but this was not purulent. Wound edges clean   Testicles: palpated, non-tender  Epididymis: Not done    Cystoscopy: Not done    Imaging: None    Urinalysis: UA RESULTS:  Recent Labs   Lab Test 09/19/12  0959   COLOR Yellow   APPEARANCE Clear   URINEGLC Negative   URINEBILI Negative   URINEKETONE Negative   SG 1.016   UBLD Negative   URINEPH 5.0   PROTEIN Negative   NITRITE Negative   LEUKEST Small*   RBCU 0   WBCU 2       PSA:     Post Void Residual:     Other labs: None today      IMPRESSION:  75 y/o M with fat containing hernia on the left as well as posterior scrotal cyst that was I and Ded on 04/18/2020    PLAN:  Follow up in 2-3 weeks for wound check   Will continue ASDI for prostate cancer   Once wound has healed will refer for hernia repair with general surgery         Total Time: 15 minutes                                       Total in Consultation: greater than 50%       Again, thank you for allowing me to participate in the care of your patient.      Sincerely,    Zulay Lopez MD

## 2020-05-01 NOTE — NURSING NOTE
"Chief Complaint   Patient presents with     Follow Up     Scrotal wound/PSA       Height 1.88 m (6' 2\"), weight 108 kg (238 lb). Body mass index is 30.56 kg/m .    Patient Active Problem List   Diagnosis     Paroxysmal atrial fibrillation (H)     ADHD (attention deficit hyperactivity disorder)     CAD (coronary artery disease)     Insomnia     Major depression, recurrent (H)     BPH (benign prostatic hyperplasia)     Hypertension     Peripheral neuropathy     Preventative health care     Bilateral sensorineural hearing loss     Essential hypertension with goal blood pressure less than 140/90     Hyperlipidemia LDL goal <100     Perineal abscess       Allergies   Allergen Reactions     Oxycodone Other (See Comments)     Other reaction(s): Hallucinations  Hallucinations.  Other reaction(s): Hallucinations  Other reaction(s): Hallucinations  Hallucinations.  Other reaction(s): Hallucinations     Hmg-Coa-R Inhibitors Muscle Pain (Myalgia)     Other reaction(s): Myalgia     Sulfasalazine      Sulfa Drugs Rash       Current Outpatient Medications   Medication Sig Dispense Refill     acetaminophen (TYLENOL) 500 MG tablet Take 1-2 tablets (500-1,000 mg) by mouth every 6 hours as needed for pain 1 Bottle 0     Amphetamine-Dextroamphetamine (ADDERALL PO) Take 10 mg by mouth daily as needed        apixaban ANTICOAGULANT (ELIQUIS) 5 MG tablet Take 1 tablet (5 mg) by mouth 2 times daily 180 tablet 3     carvedilol (COREG) 3.125 MG tablet TK 1 T PO BID WITH MEALS  3     gabapentin (NEURONTIN) 300 MG capsule Take 900 mg by mouth At Bedtime   1     leflunomide (ARAVA) 20 MG tablet TK 1 T PO QD  0     levofloxacin (LEVAQUIN) 500 MG tablet Take 1 tablet (500 mg) by mouth daily 10 tablet 0     sildenafil (VIAGRA) 100 MG cap/tab Take 1 tablet (100 mg) by mouth daily as needed for erectile dysfunction Do not take with NTG medication 10 tablet 1     traZODone (DESYREL) 100 MG tablet Take 100 mg by mouth every evening          Social " History     Tobacco Use     Smoking status: Never Smoker     Smokeless tobacco: Never Used   Substance Use Topics     Alcohol use: Yes     Comment: 1-2 alcoholic beverages per month     Drug use: No       SEYMOUR Browne  5/1/2020  10:36 AM

## 2020-05-01 NOTE — PATIENT INSTRUCTIONS
Follow up IN CLINIC in 2-3 weeks for a Wound Check, could cancel if doing well and or convert to virtual.    Chaitanya Gillis, EMT      It was a pleasure meeting with you today.  Thank you for allowing me and my team the privilege of caring for you today.  YOU are the reason we are here, and I truly hope we provided you with the excellent service you deserve.  Please let us know if there is anything else we can do for you so that we can be sure you are leaving completely satisfied with your care experience.

## 2020-05-01 NOTE — PROGRESS NOTES
"UROLOGIC DIAGNOSES:       CURRENT INTERVENTIONS:       HISTORY:     Patient with history of prostate cancer on ASDI. Called clinic on 04/14/2020 with complaints of \"cyst\" enlarging in the perineum. U/s noted fat in scrotum, no testicular infection, possible fluid collection at the base of the scrotum. Patient started antibiotics that day.   Presented to the [facility] ED and urology was called but intervention was deferred.  Patient presents to the clinic today. Notes increase in size.     CT scan was obtained.   This demonstrated a fat containing hernia anteriorly as well as fluid collection posterior to the hernia (closer to the perineum).     Patient is s/p I and D of abovementioned infected scrotal cyst. Patient notes some drainage from the wound and is concerned about wound healing.     PSA was 2.98 today, history of elevated PSA, found to have [place] 3+3 on biopsy.           PAST MEDICAL HISTORY:   Past Medical History:   Diagnosis Date     ADHD (attention deficit hyperactivity disorder)      Aneurysm artery, iliac (H)     father had AAA surgery     Anxiety      Atrial fibrillation (H) 5/9/2011     Bicuspid aortic valve      Coronary artery disease     ablation 2011     Depressive disorder      Diverticulitis      Gastro-oesophageal reflux disease      Hearing loss      History of spinal cord injury      Hypertension      Other chronic pain      Polymyalgia rheumatica (H)      Prostate infection      Restless legs      S/P nasal septoplasty     1998     Seasonal affective disorder (H)      Sleep apnea      Tachycardia     exercise initiated       PAST SURGICAL HISTORY:   Past Surgical History:   Procedure Laterality Date     BACK SURGERY      cervical fusion 2005     BIOPSY ARTERY TEMPORAL Left 4/4/2019    Procedure: Left Temporal Artery Biopsy;  Surgeon: Bette Curry MD;  Location: [facility] OR     BLEPHAROPLASTY BILATERAL  2/27/2012    Procedure:BLEPHAROPLASTY BILATERAL; BILATERAL UPPER LID BLEPHAROPLASTY ; " "Surgeon:JESSE LINDSEY; Location:[facility]     CARDIAC SURGERY      stent 2003     CYSTOSCOPY       INCISION AND DRAINAGE ABSCESS SCROTUM, COMBINED N/A 4/18/2020    Procedure: INCISION AND DRAINAGE of SCROTAL CYST;  Surgeon: Cy Lopez MD;  Location: [facility] OR     ORTHOPEDIC SURGERY      bilat hip replaced 2008,2009     PROSTATE SURGERY         FAMILY HISTORY:   Family History   Problem Relation Age of Onset     Arthritis Mother      Hypertension Mother      Hypertension Father      Heart Failure Father      Heart Disease Maternal Grandfather      Heart Disease Paternal Grandmother        SOCIAL HISTORY:   Social History     Tobacco Use     Smoking status: Never Smoker     Smokeless tobacco: Never Used   Substance Use Topics     Alcohol use: Yes     Comment: 1-2 alcoholic beverages per month       Current Outpatient Medications   Medication     acetaminophen (TYLENOL) 500 MG tablet     Amphetamine-Dextroamphetamine (ADDERALL PO)     apixaban ANTICOAGULANT (ELIQUIS) 5 MG tablet     carvedilol (COREG) 3.125 MG tablet     gabapentin (NEURONTIN) 300 MG capsule     leflunomide (ARAVA) 20 MG tablet     levofloxacin (LEVAQUIN) 500 MG tablet     sildenafil (VIAGRA) 100 MG cap/tab     traZODone (DESYREL) 100 MG tablet     No current facility-administered medications for this visit.          PHYSICAL EXAM:    Ht 1.88 m (6' 2\")   Wt 108 kg (238 lb)   BMI 30.56 kg/m      HEENT: Normocephalic and atraumatic   Cardiac: Not done  Back/Flank: Not done  CNS/PNS: Not done  Respiratory: Normal non-labored breathing  Abdomen: Soft nontender and nondistended  Peripheral Vascular: Not done  Mental Status: Not done    Penis: Not done  Scrotal Skin: erythema noted   Wound healing, some drainage noted but this was not purulent. Wound edges clean   Testicles: palpated, non-tender  Epididymis: Not done    Cystoscopy: Not done    Imaging: None    Urinalysis: UA RESULTS:  Recent Labs   Lab Test 09/19/12  0959   COLOR Yellow   APPEARANCE " Clear   URINEGLC Negative   URINEBILI Negative   URINEKETONE Negative   SG 1.016   UBLD Negative   URINEPH 5.0   PROTEIN Negative   NITRITE Negative   LEUKEST Small*   RBCU 0   WBCU 2       PSA:     Post Void Residual:     Other labs: None today      IMPRESSION:  73 y/o M with fat containing hernia on the left as well as posterior scrotal cyst that was I and Ded on 04/18/2020    PLAN:  Follow up in 2-3 weeks for wound check   Will continue ASDI for prostate cancer   Once wound has healed will refer for hernia repair with general surgery         Total Time: 15 minutes                                       Total in Consultation: greater than 50%

## 2020-05-08 ENCOUNTER — TELEPHONE (OUTPATIENT)
Dept: UROLOGY | Facility: CLINIC | Age: 75
End: 2020-05-08

## 2020-05-08 NOTE — TELEPHONE ENCOUNTER
Called 05/08/20 at 3:15 PM and  to reschedule. Dr. Lopez will NOT be in clinic on 5/20. He needs to be rescheduled for 5/22 for an IN PERSON Visit the AM of 5/22.    Chaitanya Gillis, EMT

## 2020-05-13 ENCOUNTER — PRE VISIT (OUTPATIENT)
Dept: UROLOGY | Facility: CLINIC | Age: 75
End: 2020-05-13

## 2020-05-13 NOTE — TELEPHONE ENCOUNTER
Chief Complaint : In Person     Hx/Sx: Wound Check of scrotal abscess    Records/Orders: Available     Pt Contacted: n/a    At Rooming: If pt is doing well, can be converted to virtual visit

## 2020-05-15 ENCOUNTER — TELEPHONE (OUTPATIENT)
Dept: UROLOGY | Facility: CLINIC | Age: 75
End: 2020-05-15

## 2020-05-15 NOTE — TELEPHONE ENCOUNTER
[facility] Call Center    Phone Message    May a detailed message be left on voicemail: yes     Reason for Call: Order(s): Home Care Orders: Skilled Nursinx week for 3 weeks and 3 as need visits.  Veronica also wanted to mention that at Christophe's next appointment on , that Dr. Lopez may need to debre the edge of the wound to facilitate healing.    Action Taken: Message routed to:  [facility] ([facility]  Urology    Travel Screening: Not Applicable

## 2020-05-15 NOTE — TELEPHONE ENCOUNTER
LVM for Veronica to do skilled nursing and let us know if she needs anything else.  Malissa De La Cruz LPN

## 2020-05-18 ENCOUNTER — DOCUMENTATION ONLY (OUTPATIENT)
Dept: CARE COORDINATION | Facility: CLINIC | Age: 75
End: 2020-05-18

## 2020-05-18 LAB
FUNGUS SPEC CULT: NORMAL
SPECIMEN SOURCE: NORMAL

## 2020-06-12 ENCOUNTER — VIRTUAL VISIT (OUTPATIENT)
Dept: CARDIOLOGY | Facility: CLINIC | Age: 75
End: 2020-06-12
Attending: INTERNAL MEDICINE
Payer: MEDICARE

## 2020-06-12 VITALS — HEART RATE: 59 BPM

## 2020-06-12 DIAGNOSIS — I48.0 PAROXYSMAL ATRIAL FIBRILLATION (H): Primary | ICD-10-CM

## 2020-06-12 DIAGNOSIS — Q23.81 BICUSPID AORTIC VALVE: ICD-10-CM

## 2020-06-12 PROCEDURE — 99214 OFFICE O/P EST MOD 30 MIN: CPT | Mod: 95 | Performed by: INTERNAL MEDICINE

## 2020-06-12 ASSESSMENT — PAIN SCALES - GENERAL: PAINLEVEL: NO PAIN (0)

## 2020-06-12 NOTE — PROGRESS NOTES
"Electrophysiology Clinic Video Virtual Visit    Cy Seymour is a 74 year old male who is being evaluated via a billable video visit.      The patient has been notified of following:     \"This video visit will be conducted via a call between you and your physician/provider. We have found that certain health care needs can be provided without the need for an in-person physical exam.  This service lets us provide the care you need with a video conversation.  If a prescription is necessary we can send it directly to your pharmacy.  If lab work is needed we can place an order for that and you can then stop by our lab to have the test done at a later time.    If during the course of the call the physician/provider feels a video visit is not appropriate, you will not be charged for this service.\"     Physician/provider has received verbal consent for a Video Visit from the patient? Yes      HPI:   Mr. Seymour is a 74 year old male who has a past medical history significant for CAD s/p PCI RCA 2003 now  RCA, PCI LAD 2012, HTN, HLD, PAF (CHADSVASC 3 on Eliquis) s/p PVI 8/2011, BAV with mild aortic stenosis, iliac artery aneurysm, GERD, polymyalgia rheumatica, IVANA (uses CPAP), RLS, and anxiety.     He has a cardiac history of angina and CAD for which he had PCI to RCA in 2003 and last heart cath in 2015 showed  of RCA unable to be opened. He also had prior PCI to LAD in 2012. Last NM stress test from 8/2018 showed small fixed defects with no significant ischemia. He was last seen by Cardiology at [facility] in 7/2019. He was restarted on low dose Crestor and baby ASA at that visit. He also has had PAF for which he ultimately underwent a PVI ablation in 8/2011 with Dr. Beal. He has not had any known recurrences of AF. He has a known BAV with echos showing mild/mod aortic stenosis.    EP Visit 12/4/19: He presents today to establish care. He states he has tried several statins but has not tolerated due to muscle " aches. He was tried on low dose Crestor in 7/2019 but did not tolerate this. He reports over the last year he has felt more fatigued. He denies any known recurrences of AF. He does feel that his HR picks up quickly with activity. He also reports someconstant chest pressure associated with numbness/tightness in his neck and left arm. He endorses dizziness with change in position. He denies any worsening shortness of breath, leg/ankle swelling, PND, orthopnea, palpitations, or syncopal symptoms. Presenting 12 lead ECG shows SR with PVCs Vent Rate 68 bpm,  ms,  ms, QTc 452 ms.  Current cardiac medications include: Coreg, and ASA. His ASA was stopped and he was started on Eliquis at this visit.     He is following up today. He reports feeling at baseline. He denies any chest pain/pressures, dizziness, lightheadedness, worsening shortness of breath, leg/ankle swelling, PND, orthopnea, palpitations, or syncopal symptoms. CMRI from 12/2019 showed Normal biventricular function, no ischemia, evidence of prior infarct (known CAD hx), and BAV (known) with mild aortic stenosis and mild aortic dilation. Zio patch from 2/2020 showed SR with NSVT up to 19 beats and PSVT up to 24 seconds. Adequate HR distribution in sinus. SCr in 4/2020 was WDL. Continue with current treatment plan. Current cardiac medications include: Coreg, and Eliquis.     PAST MEDICAL HISTORY:  Past Medical History:   Diagnosis Date     ADHD (attention deficit hyperactivity disorder)      Aneurysm artery, iliac (H)     father had AAA surgery     Anxiety      Atrial fibrillation (H) 5/9/2011     Bicuspid aortic valve      Coronary artery disease     ablation 2011     Depressive disorder      Diverticulitis      Gastro-oesophageal reflux disease      Hearing loss      History of spinal cord injury      Hypertension      Other chronic pain      Polymyalgia rheumatica (H)      Prostate infection      Restless legs      S/P nasal septoplasty     1998      Seasonal affective disorder (H)      Sleep apnea      Tachycardia     exercise initiated       CURRENT MEDICATIONS:  Current Outpatient Medications   Medication Sig Dispense Refill     Amphetamine-Dextroamphetamine (ADDERALL PO) Take 10 mg by mouth daily as needed        apixaban ANTICOAGULANT (ELIQUIS) 5 MG tablet Take 1 tablet (5 mg) by mouth 2 times daily 180 tablet 3     carvedilol (COREG) 3.125 MG tablet TK 1 T PO BID WITH MEALS  3     gabapentin (NEURONTIN) 300 MG capsule Take 900 mg by mouth At Bedtime   1     leflunomide (ARAVA) 20 MG tablet TK 1 T PO QD  0     levofloxacin (LEVAQUIN) 500 MG tablet Take 1 tablet (500 mg) by mouth daily 10 tablet 0     sildenafil (VIAGRA) 100 MG cap/tab Take 1 tablet (100 mg) by mouth daily as needed for erectile dysfunction Do not take with NTG medication 10 tablet 1     traZODone (DESYREL) 100 MG tablet Take 100 mg by mouth every evening          PAST SURGICAL HISTORY:  Past Surgical History:   Procedure Laterality Date     BACK SURGERY      cervical fusion 2005     BIOPSY ARTERY TEMPORAL Left 4/4/2019    Procedure: Left Temporal Artery Biopsy;  Surgeon: Bette Curry MD;  Location: [facility] OR     BLEPHAROPLASTY BILATERAL  2/27/2012    Procedure:BLEPHAROPLASTY BILATERAL; BILATERAL UPPER LID BLEPHAROPLASTY ; Surgeon:JESSE LINDSEY; Location:[facility]     CARDIAC SURGERY      stent 2003     CYSTOSCOPY       INCISION AND DRAINAGE ABSCESS SCROTUM, COMBINED N/A 4/18/2020    Procedure: INCISION AND DRAINAGE of SCROTAL CYST;  Surgeon: Cy Lopez MD;  Location:  OR     ORTHOPEDIC SURGERY      bilat hip replaced 2008,2009     PROSTATE SURGERY         ALLERGIES:     Allergies   Allergen Reactions     Oxycodone Other (See Comments)     Other reaction(s): Hallucinations  Hallucinations.  Other reaction(s): Hallucinations  Other reaction(s): Hallucinations  Hallucinations.  Other reaction(s): Hallucinations     Hmg-Coa-R Inhibitors Muscle Pain (Myalgia)     Other  reaction(s): Myalgia     Sulfasalazine      Sulfa Drugs Rash       FAMILY HISTORY:  Family History   Problem Relation Age of Onset     Arthritis Mother      Hypertension Mother      Hypertension Father      Heart Failure Father      Heart Disease Maternal Grandfather      Heart Disease Paternal Grandmother        SOCIAL HISTORY:  Social History     Tobacco Use     Smoking status: Never Smoker     Smokeless tobacco: Never Used   Substance Use Topics     Alcohol use: Yes     Comment: 1-2 alcoholic beverages per month     Drug use: No       ROS:  10 point ROS neg other than the symptoms noted above in the HPI.    Exam:  The rest of a comprehensive physical examination is deferred due to public health emergency video visit restrictions.  CONSITUTIONAL: no acute distress  HEENT: no icterus, no redness or discharge, neck supple  CV: no visible edema of visualized extremities. No JVD.   RESPIRATORY: respirations nonlabored, no cough  NEURO: AA&Ox3, speech fluent/appropriate, motor grossly nonfocal  PSYCH: cooperative, affect appropriate  DERM: no rashes on visualized face/neck/upper extremities    Labs:  Reviewed.     Testing/Procedures:    7/20/18 ECHOCARDIOGRAM ([facility] REPORT):   Final Conclusion   Normal left ventricular size. Borderline normal left ventricular ejection fraction estimated   at 50%.   Basilar inferior and posterior hypokinesis.   Mild left ventricular hypertrophy.   Mild to moderate left atrial dilation.   No significant valvular heart disease noted.   Right ventricular systolic pressure cannot be accurately estimated.   Estimated EF: 50%    8/6/18 NM STRESS ([facility] REPORT):  Conclusion  1. Myocardial perfusion imaging is abnormal.  2. Stress SPECT images demonstrate a small sized fixed defect of mild   severity in the apical wall and a small to medium sized mostly fixed   defect of moderate severity in the lateral wall with minimal   reversibility.  3. Gated stress SPECT imaging reveals normal wall motion  4.  Calculated LVEF 62%.  5. Please see radiology report for non-cardiac findings.     2015 CORONARY ANGIOGRAM ([facility] REPORT):    The LMCA is free of significant disease.    The LAD is free of significant disease.    The Circumflex is free of significant disease.    The 2nd Marginal is free of significant disease.    50% stenosis in the Proximal RCA    100% stenosis in the Proximal RCA (Restenosis - Drug Eluting Stent)    Left ventricular ejection fraction is 65%. LV Pressure = 132/10.    Unsuccessful to Proximal RCA, post stenosis 100%    12/2019 CMRI:  1. The LV is mildly dilated. The global systolic function is low normal. The LVEF is 58%. There is severe  hypokinesis and thinning of the mid to basal anterolateral wall and mild hypokinesis of the basal  inferolateral and inferior walls.      2. The RV is normal in cavity size. The global systolic function is normal. The RVEF is 57%.      3. There is mild bi-atrial enlargement.      4. The aortic valve is bicuspid with mild aortic stenosis (peak gradient of 16 mm Hg) and mild aortic  regurgitation (regurgitant volume of 6 ml).      5. Regadenoson stress perfusion imaging is negative for ischemia.     6. Late gadolinium enhancement imaging demonstrates subendocardial enhancement involving the mid to basal  anterolateral wall, findings consistent with a prior infarction in the diagonal/circumflex distribution.  The myocardium in all three coronary distributions appears  predominantly viable (total scar  burden of  4%).      MRA Aorta     1. There is mild dilatation of        the aortic root (4.2 cm x 4.2 cm at the level of the Sinuses of  Valsalva)  and ascending aorta (4.1 cm x 4.0 cm at the level of the main pulmonary artery).   The transverse arch and descending thoracic aorta are normal in size without an aneurysm or dissection.     2. The aortic arch is left sided. There is normal branching of the arch vessels. There is no coarctation.     3. The main and proximal  branch pulmonary arteries are normal in size.      4. The systemic venous connections are normal.      CONCLUSIONS:  Mild left ventricular dilatation with low normal systolic function and wall motion  abnormalities as described above. No ischemia on stress perfusion imaging.   Late enhancement imaging is consistent with a prior infarction in the diagonal/circumflex distribution.      Bicuspid aortic valve with mild aortic stenosis/regurgitation. Mild dilatation of the aortic root and  ascending thoracic aorta.     Assessment and Plan:   Mr. Seymour is a 74 year old male who has a past medical history significant for CAD s/p PCI RCA 2003 now  RCA, PCI LAD 2012, HTN, HLD, PAF (CHADSVASC 3 on Eliquis) s/p PVI 8/2011, BAV with mild aortic stenosis, iliac artery aneurysm, GERD, polymyalgia rheumatica, IVANA (uses CPAP), RLS, and anxiety. He is following up today. He reports feeling at baseline. He denies any chest pain/pressures, dizziness, lightheadedness, worsening shortness of breath, leg/ankle swelling, PND, orthopnea, palpitations, or syncopal symptoms. CMRI from 12/2019 showed Normal biventricular function, no ischemia, evidence of prior infarct (known CAD hx), and BAV (known) with mild aortic stenosis and mild aortic dilation. Zio patch from 2/2020 showed SR with NSVT up to 19 beats and PSVT up to 24 seconds. Adequate HR distribution in sinus. SCr in 4/2020 was WDL. Continue with current treatment plan. Current cardiac medications include: Coreg, and Eliquis.       Paroxysmal Atrial Fibrillation:   1. Stroke Prophylaxis:  CHADSVASC=+CAD, +age, +HTN  3, corresponding to a 3.2% annual stroke / systemic emolism event rate. indicating need for long term oral anticoagulation.  He is appropriately on Eliquis. No bleeding issues.   2. Rate Control: Continue Coreg.   3. Rhythm Control: s/p PVI 2011, doing well without significant issues. No compelling indication for further rhythm control at this time.   4. Risk Factor  Management: maintain good BP control and continued IVANA treatment.      Coronary Artery Disease:  History of PCIs LAD and RCA  1. Aspirin: Continue ASA 81 mg daily.   2. Statin: Has not tolerated several statins. Most recently tried low dose crestor in 7/2019 which he stopped due to muscle aches.   3. BB: Continue Coreg  4. Nitroglycerin 0.4 mg PRN for chest pain. Place 1 tablet (0.4 mg) under the tongue every 5 minutes as needed for chest pain. Maximum of 3 doses in 15 minutes.  5. Lifestyle: Cardiac diet, avoid tobacco, maintain a healthy weight, limit alcohol, and exercise.  6. Recent stress CMRI showed no ischemia and evidence of prior infarct.     Bicuspid Aortic Valve:  1. Reviewed recommendations for screening of family  2. MRA showed mild aortic dilation and mild aortic stenosis. Will monitor annually with imaging. Would get updated CMRI/MRA in 1/2021.  3. Maintain good BP control     Follow up in 1 year.     Video-Visit Details    Type of service:  Video Visit    Video Visit Duration: 8 minutes.     Originating Location (pt. Location): Home    Distant Location (provider location):  [facility]     Mode of Communication:  Video Conference via [employer]    I have reviewed the note as documented above.  This accurately captures the substance of my virtual visit with the patient. The patient states understanding and is agreeable with the plan.     Lior Willson MD [facility]  Cardiology - Electrophysiology

## 2020-06-12 NOTE — LETTER
"6/12/2020      RE: Cy Seymour  [address]   [facility] [address]       Dear Colleague,    Thank you for the opportunity to participate in the care of your patient, Cy Seymour, at the [facility] at [facility]. Please see a copy of my visit note below.    Electrophysiology Clinic Video Virtual Visit    Cy Seymour is a 74 year old male who is being evaluated via a billable video visit.      The patient has been notified of following:     \"This video visit will be conducted via a call between you and your physician/provider. We have found that certain health care needs can be provided without the need for an in-person physical exam.  This service lets us provide the care you need with a video conversation.  If a prescription is necessary we can send it directly to your pharmacy.  If lab work is needed we can place an order for that and you can then stop by our lab to have the test done at a later time.    If during the course of the call the physician/provider feels a video visit is not appropriate, you will not be charged for this service.\"     Physician/provider has received verbal consent for a Video Visit from the patient? Yes      HPI:   Mr. Seymour is a 74 year old male who has a past medical history significant for CAD s/p PCI RCA 2003 now  RCA, PCI LAD 2012, HTN, HLD, PAF (CHADSVASC 3 on Eliquis) s/p PVI 8/2011, BAV with mild aortic stenosis, iliac artery aneurysm, GERD, polymyalgia rheumatica, IVANA (uses CPAP), RLS, and anxiety.     He has a cardiac history of angina and CAD for which he had PCI to RCA in 2003 and last heart cath in 2015 showed  of RCA unable to be opened. He also had prior PCI to LAD in 2012. Last NM stress test from 8/2018 showed small fixed defects with no significant ischemia. He was last seen by Cardiology at [facility] in 7/2019. He was restarted on low dose Crestor and baby ASA at that visit. He " also has had PAF for which he ultimately underwent a PVI ablation in 8/2011 with Dr. Beal. He has not had any known recurrences of AF. He has a known BAV with echos showing mild/mod aortic stenosis.    EP Visit 12/4/19: He presents today to establish care. He states he has tried several statins but has not tolerated due to muscle aches. He was tried on low dose Crestor in 7/2019 but did not tolerate this. He reports over the last year he has felt more fatigued. He denies any known recurrences of AF. He does feel that his HR picks up quickly with activity. He also reports someconstant chest pressure associated with numbness/tightness in his neck and left arm. He endorses dizziness with change in position. He denies any worsening shortness of breath, leg/ankle swelling, PND, orthopnea, palpitations, or syncopal symptoms. Presenting 12 lead ECG shows SR with PVCs Vent Rate 68 bpm,  ms,  ms, QTc 452 ms.  Current cardiac medications include: Coreg, and ASA. His ASA was stopped and he was started on Eliquis at this visit.     He is following up today. He reports feeling at baseline. He denies any chest pain/pressures, dizziness, lightheadedness, worsening shortness of breath, leg/ankle swelling, PND, orthopnea, palpitations, or syncopal symptoms. CMRI from 12/2019 showed Normal biventricular function, no ischemia, evidence of prior infarct (known CAD hx), and BAV (known) with mild aortic stenosis and mild aortic dilation. Zio patch from 2/2020 showed SR with NSVT up to 19 beats and PSVT up to 24 seconds. Adequate HR distribution in sinus. SCr in 4/2020 was WDL. Continue with current treatment plan. Current cardiac medications include: Coreg, and Eliquis.     PAST MEDICAL HISTORY:  Past Medical History:   Diagnosis Date     ADHD (attention deficit hyperactivity disorder)      Aneurysm artery, iliac (H)     father had AAA surgery     Anxiety      Atrial fibrillation (H) 5/9/2011     Bicuspid aortic valve       Coronary artery disease     ablation 2011     Depressive disorder      Diverticulitis      Gastro-oesophageal reflux disease      Hearing loss      History of spinal cord injury      Hypertension      Other chronic pain      Polymyalgia rheumatica (H)      Prostate infection      Restless legs      S/P nasal septoplasty     1998     Seasonal affective disorder (H)      Sleep apnea      Tachycardia     exercise initiated       CURRENT MEDICATIONS:  Current Outpatient Medications   Medication Sig Dispense Refill     Amphetamine-Dextroamphetamine (ADDERALL PO) Take 10 mg by mouth daily as needed        apixaban ANTICOAGULANT (ELIQUIS) 5 MG tablet Take 1 tablet (5 mg) by mouth 2 times daily 180 tablet 3     carvedilol (COREG) 3.125 MG tablet TK 1 T PO BID WITH MEALS  3     gabapentin (NEURONTIN) 300 MG capsule Take 900 mg by mouth At Bedtime   1     leflunomide (ARAVA) 20 MG tablet TK 1 T PO QD  0     levofloxacin (LEVAQUIN) 500 MG tablet Take 1 tablet (500 mg) by mouth daily 10 tablet 0     sildenafil (VIAGRA) 100 MG cap/tab Take 1 tablet (100 mg) by mouth daily as needed for erectile dysfunction Do not take with NTG medication 10 tablet 1     traZODone (DESYREL) 100 MG tablet Take 100 mg by mouth every evening          PAST SURGICAL HISTORY:  Past Surgical History:   Procedure Laterality Date     BACK SURGERY      cervical fusion 2005     BIOPSY ARTERY TEMPORAL Left 4/4/2019    Procedure: Left Temporal Artery Biopsy;  Surgeon: Bette Curry MD;  Location:  OR     BLEPHAROPLASTY BILATERAL  2/27/2012    Procedure:BLEPHAROPLASTY BILATERAL; BILATERAL UPPER LID BLEPHAROPLASTY ; Surgeon:JESSE LINDSEY; Location[facility]     CARDIAC SURGERY      stent 2003     CYSTOSCOPY       INCISION AND DRAINAGE ABSCESS SCROTUM, COMBINED N/A 4/18/2020    Procedure: INCISION AND DRAINAGE of SCROTAL CYST;  Surgeon: Cy Lopez MD;  Location:  OR     ORTHOPEDIC SURGERY      bilat hip replaced 2008,2009     PROSTATE SURGERY          ALLERGIES:     Allergies   Allergen Reactions     Oxycodone Other (See Comments)     Other reaction(s): Hallucinations  Hallucinations.  Other reaction(s): Hallucinations  Other reaction(s): Hallucinations  Hallucinations.  Other reaction(s): Hallucinations     Hmg-Coa-R Inhibitors Muscle Pain (Myalgia)     Other reaction(s): Myalgia     Sulfasalazine      Sulfa Drugs Rash       FAMILY HISTORY:  Family History   Problem Relation Age of Onset     Arthritis Mother      Hypertension Mother      Hypertension Father      Heart Failure Father      Heart Disease Maternal Grandfather      Heart Disease Paternal Grandmother        SOCIAL HISTORY:  Social History     Tobacco Use     Smoking status: Never Smoker     Smokeless tobacco: Never Used   Substance Use Topics     Alcohol use: Yes     Comment: 1-2 alcoholic beverages per month     Drug use: No       ROS:  10 point ROS neg other than the symptoms noted above in the HPI.    Exam:  The rest of a comprehensive physical examination is deferred due to public health emergency video visit restrictions.  CONSITUTIONAL: no acute distress  HEENT: no icterus, no redness or discharge, neck supple  CV: no visible edema of visualized extremities. No JVD.   RESPIRATORY: respirations nonlabored, no cough  NEURO: AA&Ox3, speech fluent/appropriate, motor grossly nonfocal  PSYCH: cooperative, affect appropriate  DERM: no rashes on visualized face/neck/upper extremities    Labs:  Reviewed.     Testing/Procedures:    7/20/18 ECHOCARDIOGRAM ([facility] REPORT):   Final Conclusion   Normal left ventricular size. Borderline normal left ventricular ejection fraction estimated   at 50%.   Basilar inferior and posterior hypokinesis.   Mild left ventricular hypertrophy.   Mild to moderate left atrial dilation.   No significant valvular heart disease noted.   Right ventricular systolic pressure cannot be accurately estimated.   Estimated EF: 50%    8/6/18 NM STRESS ([facility] REPORT):  Conclusion  1.  Myocardial perfusion imaging is abnormal.  2. Stress SPECT images demonstrate a small sized fixed defect of mild   severity in the apical wall and a small to medium sized mostly fixed   defect of moderate severity in the lateral wall with minimal   reversibility.  3. Gated stress SPECT imaging reveals normal wall motion  4. Calculated LVEF 62%.  5. Please see radiology report for non-cardiac findings.     2015 CORONARY ANGIOGRAM ([facility] REPORT):    The LMCA is free of significant disease.    The LAD is free of significant disease.    The Circumflex is free of significant disease.    The 2nd Marginal is free of significant disease.    50% stenosis in the Proximal RCA    100% stenosis in the Proximal RCA (Restenosis - Drug Eluting Stent)    Left ventricular ejection fraction is 65%. LV Pressure = 132/10.    Unsuccessful to Proximal RCA, post stenosis 100%    12/2019 CMRI:  1. The LV is mildly dilated. The global systolic function is low normal. The LVEF is 58%. There is severe  hypokinesis and thinning of the mid to basal anterolateral wall and mild hypokinesis of the basal  inferolateral and inferior walls.      2. The RV is normal in cavity size. The global systolic function is normal. The RVEF is 57%.      3. There is mild bi-atrial enlargement.      4. The aortic valve is bicuspid with mild aortic stenosis (peak gradient of 16 mm Hg) and mild aortic  regurgitation (regurgitant volume of 6 ml).      5. Regadenoson stress perfusion imaging is negative for ischemia.     6. Late gadolinium enhancement imaging demonstrates subendocardial enhancement involving the mid to basal  anterolateral wall, findings consistent with a prior infarction in the diagonal/circumflex distribution.  The myocardium in all three coronary distributions appears  predominantly viable (total scar  burden of  4%).      MRA Aorta     1. There is mild dilatation of        the aortic root (4.2 cm x 4.2 cm at the level of the Sinuses of   Valsalva)  and ascending aorta (4.1 cm x 4.0 cm at the level of the main pulmonary artery).   The transverse arch and descending thoracic aorta are normal in size without an aneurysm or dissection.     2. The aortic arch is left sided. There is normal branching of the arch vessels. There is no coarctation.     3. The main and proximal branch pulmonary arteries are normal in size.      4. The systemic venous connections are normal.      CONCLUSIONS:  Mild left ventricular dilatation with low normal systolic function and wall motion  abnormalities as described above. No ischemia on stress perfusion imaging.   Late enhancement imaging is consistent with a prior infarction in the diagonal/circumflex distribution.      Bicuspid aortic valve with mild aortic stenosis/regurgitation. Mild dilatation of the aortic root and  ascending thoracic aorta.     Assessment and Plan:   Mr. Seymour is a 74 year old male who has a past medical history significant for CAD s/p PCI RCA 2003 now  RCA, PCI LAD 2012, HTN, HLD, PAF (CHADSVASC 3 on Eliquis) s/p PVI 8/2011, BAV with mild aortic stenosis, iliac artery aneurysm, GERD, polymyalgia rheumatica, IVANA (uses CPAP), RLS, and anxiety. He is following up today. He reports feeling at baseline. He denies any chest pain/pressures, dizziness, lightheadedness, worsening shortness of breath, leg/ankle swelling, PND, orthopnea, palpitations, or syncopal symptoms. CMRI from 12/2019 showed Normal biventricular function, no ischemia, evidence of prior infarct (known CAD hx), and BAV (known) with mild aortic stenosis and mild aortic dilation. Zio patch from 2/2020 showed SR with NSVT up to 19 beats and PSVT up to 24 seconds. Adequate HR distribution in sinus. SCr in 4/2020 was WDL. Continue with current treatment plan. Current cardiac medications include: Coreg, and Eliquis.       Paroxysmal Atrial Fibrillation:   1. Stroke Prophylaxis:  CHADSVASC=+CAD, +age, +HTN  3, corresponding to a 3.2%  annual stroke / systemic emolism event rate. indicating need for long term oral anticoagulation.  He is appropriately on Eliquis. No bleeding issues.   2. Rate Control: Continue Coreg.   3. Rhythm Control: s/p PVI 2011, doing well without significant issues. No compelling indication for further rhythm control at this time.   4. Risk Factor Management: maintain good BP control and continued IVANA treatment.      Coronary Artery Disease:  History of PCIs LAD and RCA  1. Aspirin: Continue ASA 81 mg daily.   2. Statin: Has not tolerated several statins. Most recently tried low dose crestor in 7/2019 which he stopped due to muscle aches.   3. BB: Continue Coreg  4. Nitroglycerin 0.4 mg PRN for chest pain. Place 1 tablet (0.4 mg) under the tongue every 5 minutes as needed for chest pain. Maximum of 3 doses in 15 minutes.  5. Lifestyle: Cardiac diet, avoid tobacco, maintain a healthy weight, limit alcohol, and exercise.  6. Recent stress CMRI showed no ischemia and evidence of prior infarct.     Bicuspid Aortic Valve:  1. Reviewed recommendations for screening of family  2. MRA showed mild aortic dilation and mild aortic stenosis. Will monitor annually with imaging. Would get updated CMRI/MRA in 1/2021.  3. Maintain good BP control     Follow up in 1 year.     Video-Visit Details    Type of service:  Video Visit    Video Visit Duration: 8 minutes.     Originating Location (pt. Location): Home    Distant Location (provider location):  [facility]     Mode of Communication:  Video Conference via [employer]    I have reviewed the note as documented above.  This accurately captures the substance of my virtual visit with the patient. The patient states understanding and is agreeable with the plan.     Lior Willson MD [facility]  Cardiology - Electrophysiology        Please do not hesitate to contact me if you have any questions/concerns.     Sincerely,     Lior Willson MD

## 2020-06-12 NOTE — PATIENT INSTRUCTIONS
Plan:  - We will have you get a Cardiac MRI/MRA in 1/2021  - Continue your current medications  - Follow up in 1 year  Cardiovascular Clinic:   [address] [place] [address]. [place] [address]  Your Care Team:  EP Cardiology   Telephone Number     Connie Willson [phone]   Zulay Arvizu RN [phone]     For scheduling appts or procedures:    Lana Hancock   [phone]     As always, Thank you for trusting us with your health care needs!

## 2020-06-12 NOTE — PROGRESS NOTES
"Cy Seymour is a 74 year old male who is being evaluated via a billable video visit.      The patient has been notified of following:     \"This video visit will be conducted via a call between you and your physician/provider. We have found that certain health care needs can be provided without the need for an in-person physical exam.  This service lets us provide the care you need with a video conversation.  If a prescription is necessary we can send it directly to your pharmacy.  If lab work is needed we can place an order for that and you can then stop by our lab to have the test done at a later time.    Video visits are billed at different rates depending on your insurance coverage.  Please reach out to your insurance provider with any questions.    If during the course of the call the physician/provider feels a video visit is not appropriate, you will not be charged for this service.\"    Patient has given verbal consent for Video visit? Yes    Will anyone else be joining your video visit? No        "

## 2020-07-28 ENCOUNTER — TELEPHONE (OUTPATIENT)
Dept: UROLOGY | Facility: CLINIC | Age: 75
End: 2020-07-28

## 2020-07-28 NOTE — TELEPHONE ENCOUNTER
Patient was notified that he can follow up with Dr. Cy Lopez 7/31/2020 @ 11:00 am. Patient agreed with the plan.      Pricilla Melo MA

## 2020-07-28 NOTE — TELEPHONE ENCOUNTER
Edy Moreno,    Patient states that he had surgery on 4/18/2020 with Lilliana. Cy Lopez for a scrotal cyst. He states that the wound is starting to leak and there is a lump is starting to develop again. No fever or chills. Is it ok for him to schedule an appointment for 7/31/2020 with Dr. Lopez in the am around 11:00 am? Patient states that he does not want to send picture trough his my chart portal. Please advise    Thanks, Pricilla Melo MA

## 2020-07-28 NOTE — TELEPHONE ENCOUNTER
[facility] Call Center    Phone Message    May a detailed message be left on voicemail: yes     Reason for Call: Other: Patient called sandra he had surgery with John on an abcess the wound area is still leaking, please call to discuss in perosn appointment.     Action Taken: Other: [facility] UROLOGY    Travel Screening: Not Applicable

## 2020-07-30 ENCOUNTER — PRE VISIT (OUTPATIENT)
Dept: UROLOGY | Facility: CLINIC | Age: 75
End: 2020-07-30

## 2020-07-31 ENCOUNTER — OFFICE VISIT (OUTPATIENT)
Dept: UROLOGY | Facility: CLINIC | Age: 75
End: 2020-07-31
Payer: MEDICARE

## 2020-07-31 VITALS — WEIGHT: 235 LBS | BODY MASS INDEX: 30.16 KG/M2 | HEIGHT: 74 IN

## 2020-07-31 DIAGNOSIS — C61 PROSTATE CANCER (H): ICD-10-CM

## 2020-07-31 ASSESSMENT — PAIN SCALES - GENERAL: PAINLEVEL: NO PAIN (0)

## 2020-07-31 ASSESSMENT — MIFFLIN-ST. JEOR: SCORE: 1875.7

## 2020-07-31 NOTE — LETTER
7/31/2020       RE: Cy Seymour  [id number]   [facility] [address]     Dear Colleague,    Thank you for referring your patient, Cy Seymour, to the [facility] UROLOGY AND INST FOR PROSTATE AND UROLOGIC CANCERS at [facility]. Please see a copy of my visit note below.    ASSESSMENT AND PLAN  Prostate Cancer  -follow-up with PSA late 2020 or early 2021.      Perineal Cyst  Drained 4/18/20.  I can express a small amount of fluid today.  We discussed expressing the fluid regularly vs repeat surgical excision  __________________________________________________    CHIEF COMPLAINT  It was my pleasure to see Cy Seymour who is a 75 year old male here for prostate cancer and history of perineal cyst.      HPI  He is doing well lately but has had perineal drainage that is foul smelling.    8/11/17:  Rebecca 3+3 prostate cancer. 1 core 15%.   Complicated by URINARY TRACT INFECTION with fever.      PSA   Date Value Ref Range Status   05/01/2020 2.98 0 - 4 ug/L Final     Comment:     Assay Method:  Chemiluminescence using Siemens Vista analyzer   04/06/2018 3.57 0 - 4 ug/L Final     Comment:     Assay Method:  Chemiluminescence using Siemens Vista analyzer   09/06/2016 3.38 0 - 4 ug/L Final     Comment:     Assay Method:  Chemiluminescence using Siemens Vista analyzer   04/04/2015 6.77 (H) 0 - 4 ug/L Final   01/21/2015 8.59 (H) 0 - 4 ug/L Final   09/19/2012 2.94 0 - 4 ug/L Final     Comment:     PSA results are about 7% lower than our prior method due to a methodology   change   on August 30, 2011.   07/07/2009 0.84 0 - 4 ug/L Final     No components found for: [id number]      RADIOLOGIC IMAGING  I reviewed the recent radiologic imaging and reports described below.  CORY Lopez  MRI PROSTATE: 9/30/2019 3:31 PM     CLINICAL HISTORY: Prostate cancer, T1/T2, staging. 74-year-old male  with prostate biopsy on 8/11/2017 which demonstrated left lateral  base  [place] 3+3 adenocarcinoma.     Most Recent PSA: 2.3 ug/L     Comparison: 8/13/2016, 7/27/2017.     TECHNIQUE:  The following sequences were obtained: High-resolution axial  T2-weighted, coronal T2-weighted, 3D volumetric T2-weighted, axial  pre-contrast T1, axial diffusion-weighted, axial apparent diffusion  coefficient and axial dynamic contrast-enhanced T1. Postcontrast  images were evaluated on a separate workstation to evaluate dynamic  contrast enhancement. The technique of this exam is PI-RADS v2.1  compliant. Contrast dose: 10 mL Gadavist.     FINDINGS:  Size: 3.8 x 5.9 x 4.8 cm for a volume of 56 grams  Hemorrhage: Absent  Peripheral zone: Heterogeneous on T2-weighted images. Regions of  mildly decreased signal on ADC or DWI which are best characterized as  PI-RADS 2 without highly suspicious lesion. Herniated left  anterolateral and right lateral BPH nodules.  Transition zone: Enlarged with BPH changes. Transition zone nodules  which are circumscribed or mostly encapsulated without diffusion  restriction. PI-RADS 2. No highly suspicious nodules.     Neurovascular bundles: No neurovascular bundle involvement by  malignancy.  Seminal vesicles: No seminal vesicle involvement by malignancy.  Lymph nodes: No lymph node involvement.  Bones: No suspicious lesions.  Other pelvic organs: Colonic diverticulosis. Postoperative changes of  bilateral hip arthroplasties; the associated susceptibility artifact  obscures detail in the pelvis, especially on the diffusion-weighted  images. Left inguinal hernia.                                                                         IMPRESSION:  1. Based on the most suspicious abnormality, this exam is  characterized as PIRADS 2 - Clinically significant cancer is unlikely  to be present.   2. No suspicious adenopathy or evidence of pelvic metastases.      Pathology  Collected: 8/11/2017   Received: 8/11/2017   Reported: 8/14/2017 11:16   Ordering Phy(s): FELIX  "KAREN BROWN     For improved result formatting, select 'View Enhanced Report Format'   under Linked Documents section.     SPECIMEN(S):   A: Prostate biopsy, left lateral base   B: Prostate biopsy, left lateral mid   C: Prostate biopsy, left lateral apex   D: Prostate biopsy, left base   E: Prostate biopsy, left mid   F: Prostate biopsy, left apex   G: Prostate biopsy, right lateral base   H: Prostate biopsy, right lateral mid   I: Prostate biopsy, right lateral apex   J: Prostate biopsy, right base   K: Prostate biopsy, right mid   L: Prostate biopsy, right apex     FINAL DIAGNOSIS:   A. Prostate biopsy, left lateral base:   - Prostatic adenocarcinoma   - [place] score 6 (3+3)   - Grade Group 1   - Extent: Involves one core (2 mm, 15%)     B. Prostate biopsy, left lateral mid:   - Benign prostate tissue     C. Prostate biopsy, left lateral apex:   - Benign prostate tissue     D. Prostate biopsy, left base:   - Benign prostate tissue     E. Prostate biopsy, left mid:   - Benign prostate tissue     F. Prostate biopsy, left apex:   - Benign prostate tissue     G. Prostate biopsy, right lateral base:   - Benign prostate tissue     H. Prostate biopsy, right lateral mid:   - Benign prostate tissue     I. Prostate biopsy, right lateral apex:   - Benign prostate tissue     J. Prostate biopsy, right base:   - Benign prostate tissue     K. Prostate biopsy, right mid:   - Benign prostate tissue     L. Prostate biopsy, right apex:   - Benign prostate tissue       PHYSICAL EXAM  Vitals:    07/31/20 1103   Weight: 106.6 kg (235 lb)   Height: 1.88 m (6' 2\")     Wt Readings from Last 3 Encounters:   07/31/20 106.6 kg (235 lb)   05/01/20 108 kg (238 lb)   04/18/20 108 kg (238 lb 1.6 oz)     Constitutional: Alert, no acute distress  Psychiatric: Normal mood and affect  Gastrointestinal: Abdomen soft, non-tender. No masses, organomegaly. No hernia  : No infection.  Small amount of fluid expressed with pressure on scrotal " incision.    Family History   Problem Relation Age of Onset     Arthritis Mother      Hypertension Mother      Hypertension Father      Heart Failure Father      Heart Disease Maternal Grandfather      Heart Disease Paternal Grandmother       Patient Active Problem List    Diagnosis Date Noted     Perineal abscess 04/17/2020     Priority: Medium     Preventative health care 09/06/2016     Priority: Medium     Bilateral sensorineural hearing loss 09/06/2016     Priority: Medium     Essential hypertension with goal blood pressure less than 140/90 09/06/2016     Priority: Medium     Hyperlipidemia LDL goal <100 09/06/2016     Priority: Medium     Hypertension 05/30/2014     Priority: Medium     Peripheral neuropathy 05/30/2014     Priority: Medium     ADHD (attention deficit hyperactivity disorder) 09/19/2012     Priority: Medium     CAD (coronary artery disease) 09/19/2012     Priority: Medium     Insomnia 09/19/2012     Priority: Medium     Major depression, recurrent (H) 09/19/2012     Priority: Medium     BPH (benign prostatic hyperplasia) 09/19/2012     Priority: Medium     Paroxysmal atrial fibrillation (H) 07/29/2011     Priority: Medium     Past Medical History:   Diagnosis Date     ADHD (attention deficit hyperactivity disorder)      Aneurysm artery, iliac (H)     father had AAA surgery     Anxiety      Atrial fibrillation (H) 5/9/2011     Bicuspid aortic valve      Coronary artery disease     ablation 2011     Depressive disorder      Diverticulitis      Gastro-oesophageal reflux disease      Hearing loss      History of spinal cord injury      Hypertension      Other chronic pain      Polymyalgia rheumatica (H)      Prostate infection      Restless legs      S/P nasal septoplasty     1998     Seasonal affective disorder (H)      Sleep apnea      Tachycardia     exercise initiated     Past Surgical History:   Procedure Laterality Date     BACK SURGERY      cervical fusion 2005     BIOPSY ARTERY TEMPORAL Left  4/4/2019    Procedure: Left Temporal Artery Biopsy;  Surgeon: Bette Curry MD;  Location: [facility] OR     BLEPHAROPLASTY BILATERAL  2/27/2012    Procedure:BLEPHAROPLASTY BILATERAL; BILATERAL UPPER LID BLEPHAROPLASTY ; Surgeon:JESSE LINDSEY; Location[facility]     CARDIAC SURGERY      stent 2003     CYSTOSCOPY       INCISION AND DRAINAGE ABSCESS SCROTUM, COMBINED N/A 4/18/2020    Procedure: INCISION AND DRAINAGE of SCROTAL CYST;  Surgeon: Cy Lopez MD;  Location:  OR     ORTHOPEDIC SURGERY      bilat hip replaced 2008,2009     PROSTATE SURGERY       Current Outpatient Medications   Medication Sig Dispense Refill     Amphetamine-Dextroamphetamine (ADDERALL PO) Take 10 mg by mouth daily as needed        apixaban ANTICOAGULANT (ELIQUIS) 5 MG tablet Take 1 tablet (5 mg) by mouth 2 times daily 180 tablet 3     carvedilol (COREG) 3.125 MG tablet TK 1 T PO BID WITH MEALS  3     leflunomide (ARAVA) 20 MG tablet 10 mg   0     sildenafil (VIAGRA) 100 MG cap/tab Take 1 tablet (100 mg) by mouth daily as needed for erectile dysfunction Do not take with NTG medication 10 tablet 1     traZODone (DESYREL) 100 MG tablet Take 50 mg by mouth every evening        gabapentin (NEURONTIN) 300 MG capsule Take 900 mg by mouth At Bedtime   1     levofloxacin (LEVAQUIN) 500 MG tablet Take 1 tablet (500 mg) by mouth daily (Patient not taking: Reported on 7/31/2020) 10 tablet 0      Allergies   Allergen Reactions     Oxycodone Other (See Comments)     Other reaction(s): Hallucinations  Hallucinations.  Other reaction(s): Hallucinations  Other reaction(s): Hallucinations  Hallucinations.  Other reaction(s): Hallucinations     Hmg-Coa-R Inhibitors Muscle Pain (Myalgia)     Other reaction(s): Myalgia     Sulfasalazine      Sulfa Drugs Rash     Social History     Occupational History     Not on file   Tobacco Use     Smoking status: Never Smoker     Smokeless tobacco: Never Used   Substance and Sexual Activity     Alcohol use: Yes      Comment: 1-2 alcoholic beverages per month     Drug use: No     Sexual activity: Not on file     Comment:        Recent Labs   Lab Test 04/19/20  0832 04/18/20  0631 04/17/20  1436 04/15/20  1250   WBC 8.8 8.1 9.5 10.9   HGB 10.3* 11.1* 11.3* 12.0*   HCT 34.7* 37.0* 36.9* 38.3*    285 306 327     Recent Labs   Lab Test 04/18/20  0631 04/17/20  1436 04/15/20  1250 12/26/19  0912 09/06/16  1413    141 137  --   --    POTASSIUM 3.7 3.7 3.8  --   --    CHLORIDE 111* 109 106  --   --    CO2 26 24 25  --   --    ANIONGAP 7 8 6  --   --    GLC 96 89 87  --  100.0   BUN 13 15 15  --   --    CR 0.83 0.87 0.81  --   --    GFRESTIMATED 87 85 87 83  --    GFRESTBLACK >90 >90 >90 >90  --    ABDULKADIR 8.5 8.8 8.8  --   --      Recent Labs   Lab Test 09/19/12  0959   COLOR Yellow   APPEARANCE Clear   URINEGLC Negative   URINEBILI Negative   URINEKETONE Negative   SG 1.016   UBLD Negative   URINEPH 5.0   PROTEIN Negative   NITRITE Negative   LEUKEST Small*   RBCU 0   WBCU 2       IJosiah, reviewed these laboratory values.    CC  Patient Care Team:  Lisa Mcleod MD as PCP - General  Zulay Lopez MD as MD (Urology)  Reta Gillis RN as Registered Nurse (Urology)  Abe Valencia MD as Referring Physician (Family Practice)  Zulay Akins RN as Specialty Care Coordinator (Cardiology)  Lior Willson MD as MD (Clinical Cardiac Electrophysiology)  SELF, REFERRED    Copy to patient  CHIO WOLFE  [address] [facility] [address]

## 2020-07-31 NOTE — PATIENT INSTRUCTIONS
Follow up with Dr. Lopez in December with PSA prior to appointment.    It was a pleasure meeting with you today.  Thank you for allowing me and my team the privilege of caring for you today.  YOU are the reason we are here, and I truly hope we provided you with the excellent service you deserve.  Please let us know if there is anything else we can do for you so that we can be sure you are leaving completely satisfied with your care experience.        Kiley Ramey, CMA

## 2020-07-31 NOTE — NURSING NOTE
"Return-Wound Check   The wound is now 'leaking' and starts to smell over the past month.    He does not think it is an infection.      Chief Complaint   Patient presents with     RECHECK     Wound check       Height 1.88 m (6' 2\"), weight 106.6 kg (235 lb). Body mass index is 30.17 kg/m .    Patient Active Problem List   Diagnosis     Paroxysmal atrial fibrillation (H)     ADHD (attention deficit hyperactivity disorder)     CAD (coronary artery disease)     Insomnia     Major depression, recurrent (H)     BPH (benign prostatic hyperplasia)     Hypertension     Peripheral neuropathy     Preventative health care     Bilateral sensorineural hearing loss     Essential hypertension with goal blood pressure less than 140/90     Hyperlipidemia LDL goal <100     Perineal abscess       Allergies   Allergen Reactions     Oxycodone Other (See Comments)     Other reaction(s): Hallucinations  Hallucinations.  Other reaction(s): Hallucinations  Other reaction(s): Hallucinations  Hallucinations.  Other reaction(s): Hallucinations     Hmg-Coa-R Inhibitors Muscle Pain (Myalgia)     Other reaction(s): Myalgia     Sulfasalazine      Sulfa Drugs Rash       Current Outpatient Medications   Medication Sig Dispense Refill     Amphetamine-Dextroamphetamine (ADDERALL PO) Take 10 mg by mouth daily as needed        apixaban ANTICOAGULANT (ELIQUIS) 5 MG tablet Take 1 tablet (5 mg) by mouth 2 times daily 180 tablet 3     carvedilol (COREG) 3.125 MG tablet TK 1 T PO BID WITH MEALS  3     leflunomide (ARAVA) 20 MG tablet 10 mg   0     sildenafil (VIAGRA) 100 MG cap/tab Take 1 tablet (100 mg) by mouth daily as needed for erectile dysfunction Do not take with NTG medication 10 tablet 1     traZODone (DESYREL) 100 MG tablet Take 50 mg by mouth every evening        gabapentin (NEURONTIN) 300 MG capsule Take 900 mg by mouth At Bedtime   1     levofloxacin (LEVAQUIN) 500 MG tablet Take 1 tablet (500 mg) by mouth daily (Patient not taking: Reported on " 7/31/2020) 10 tablet 0       Social History     Tobacco Use     Smoking status: Never Smoker     Smokeless tobacco: Never Used   Substance Use Topics     Alcohol use: Yes     Comment: 1-2 alcoholic beverages per month     Drug use: No       Chaitanya Gillis EMT, EMT  7/31/2020  11:07 AM

## 2020-07-31 NOTE — PROGRESS NOTES
ASSESSMENT AND PLAN  Prostate Cancer  -follow-up with PSA late 2020 or early 2021.      Perineal Cyst  Drained 4/18/20.  I can express a small amount of fluid today.  We discussed expressing the fluid regularly vs repeat surgical excision  __________________________________________________    CHIEF COMPLAINT  It was my pleasure to see Cy Seymour who is a 75 year old male here for prostate cancer and history of perineal cyst.      HPI  He is doing well lately but has had perineal drainage that is foul smelling.    8/11/17:  Rebecca 3+3 prostate cancer. 1 core 15%.   Complicated by URINARY TRACT INFECTION with fever.      PSA   Date Value Ref Range Status   05/01/2020 2.98 0 - 4 ug/L Final     Comment:     Assay Method:  Chemiluminescence using Siemens Vista analyzer   04/06/2018 3.57 0 - 4 ug/L Final     Comment:     Assay Method:  Chemiluminescence using Siemens Vista analyzer   09/06/2016 3.38 0 - 4 ug/L Final     Comment:     Assay Method:  Chemiluminescence using Siemens Vista analyzer   04/04/2015 6.77 (H) 0 - 4 ug/L Final   01/21/2015 8.59 (H) 0 - 4 ug/L Final   09/19/2012 2.94 0 - 4 ug/L Final     Comment:     PSA results are about 7% lower than our prior method due to a methodology   change   on August 30, 2011.   07/07/2009 0.84 0 - 4 ug/L Final     No components found for: [id number]      RADIOLOGIC IMAGING  I reviewed the recent radiologic imaging and reports described below.  [facility] [place], [place]  MRI PROSTATE: 9/30/2019 3:31 PM     CLINICAL HISTORY: Prostate cancer, T1/T2, staging. 74-year-old male  with prostate biopsy on 8/11/2017 which demonstrated left lateral base  [place] 3+3 adenocarcinoma.     Most Recent PSA: 2.3 ug/L     Comparison: 8/13/2016, 7/27/2017.     TECHNIQUE:  The following sequences were obtained: High-resolution axial  T2-weighted, coronal T2-weighted, 3D volumetric T2-weighted, axial  pre-contrast T1, axial diffusion-weighted, axial apparent diffusion  coefficient and axial  dynamic contrast-enhanced T1. Postcontrast  images were evaluated on a separate workstation to evaluate dynamic  contrast enhancement. The technique of this exam is PI-RADS v2.1  compliant. Contrast dose: 10 mL Gadavist.     FINDINGS:  Size: 3.8 x 5.9 x 4.8 cm for a volume of 56 grams  Hemorrhage: Absent  Peripheral zone: Heterogeneous on T2-weighted images. Regions of  mildly decreased signal on ADC or DWI which are best characterized as  PI-RADS 2 without highly suspicious lesion. Herniated left  anterolateral and right lateral BPH nodules.  Transition zone: Enlarged with BPH changes. Transition zone nodules  which are circumscribed or mostly encapsulated without diffusion  restriction. PI-RADS 2. No highly suspicious nodules.     Neurovascular bundles: No neurovascular bundle involvement by  malignancy.  Seminal vesicles: No seminal vesicle involvement by malignancy.  Lymph nodes: No lymph node involvement.  Bones: No suspicious lesions.  Other pelvic organs: Colonic diverticulosis. Postoperative changes of  bilateral hip arthroplasties; the associated susceptibility artifact  obscures detail in the pelvis, especially on the diffusion-weighted  images. Left inguinal hernia.                                                                         IMPRESSION:  1. Based on the most suspicious abnormality, this exam is  characterized as PIRADS 2 - Clinically significant cancer is unlikely  to be present.   2. No suspicious adenopathy or evidence of pelvic metastases.      Pathology  Collected: 8/11/2017   Received: 8/11/2017   Reported: 8/14/2017 11:16   Ordering Phy(s): FELIX BROWN     For improved result formatting, select 'View Enhanced Report Format'   under Linked Documents section.     SPECIMEN(S):   A: Prostate biopsy, left lateral base   B: Prostate biopsy, left lateral mid   C: Prostate biopsy, left lateral apex   D: Prostate biopsy, left base   E: Prostate biopsy, left mid   F: Prostate biopsy,  "left apex   G: Prostate biopsy, right lateral base   H: Prostate biopsy, right lateral mid   I: Prostate biopsy, right lateral apex   J: Prostate biopsy, right base   K: Prostate biopsy, right mid   L: Prostate biopsy, right apex     FINAL DIAGNOSIS:   A. Prostate biopsy, left lateral base:   - Prostatic adenocarcinoma   - Rebecca score 6 (3+3)   - Grade Group 1   - Extent: Involves one core (2 mm, 15%)     B. Prostate biopsy, left lateral mid:   - Benign prostate tissue     C. Prostate biopsy, left lateral apex:   - Benign prostate tissue     D. Prostate biopsy, left base:   - Benign prostate tissue     E. Prostate biopsy, left mid:   - Benign prostate tissue     F. Prostate biopsy, left apex:   - Benign prostate tissue     G. Prostate biopsy, right lateral base:   - Benign prostate tissue     H. Prostate biopsy, right lateral mid:   - Benign prostate tissue     I. Prostate biopsy, right lateral apex:   - Benign prostate tissue     J. Prostate biopsy, right base:   - Benign prostate tissue     K. Prostate biopsy, right mid:   - Benign prostate tissue     L. Prostate biopsy, right apex:   - Benign prostate tissue       PHYSICAL EXAM  Vitals:    07/31/20 1103   Weight: 106.6 kg (235 lb)   Height: 1.88 m (6' 2\")     Wt Readings from Last 3 Encounters:   07/31/20 106.6 kg (235 lb)   05/01/20 108 kg (238 lb)   04/18/20 108 kg (238 lb 1.6 oz)     Constitutional: Alert, no acute distress  Psychiatric: Normal mood and affect  Gastrointestinal: Abdomen soft, non-tender. No masses, organomegaly. No hernia  : No infection.  Small amount of fluid expressed with pressure on scrotal incision.    Family History   Problem Relation Age of Onset     Arthritis Mother      Hypertension Mother      Hypertension Father      Heart Failure Father      Heart Disease Maternal Grandfather      Heart Disease Paternal Grandmother       Patient Active Problem List    Diagnosis Date Noted     Perineal abscess 04/17/2020     Priority: Medium "     Preventative health care 09/06/2016     Priority: Medium     Bilateral sensorineural hearing loss 09/06/2016     Priority: Medium     Essential hypertension with goal blood pressure less than 140/90 09/06/2016     Priority: Medium     Hyperlipidemia LDL goal <100 09/06/2016     Priority: Medium     Hypertension 05/30/2014     Priority: Medium     Peripheral neuropathy 05/30/2014     Priority: Medium     ADHD (attention deficit hyperactivity disorder) 09/19/2012     Priority: Medium     CAD (coronary artery disease) 09/19/2012     Priority: Medium     Insomnia 09/19/2012     Priority: Medium     Major depression, recurrent (H) 09/19/2012     Priority: Medium     BPH (benign prostatic hyperplasia) 09/19/2012     Priority: Medium     Paroxysmal atrial fibrillation (H) 07/29/2011     Priority: Medium     Past Medical History:   Diagnosis Date     ADHD (attention deficit hyperactivity disorder)      Aneurysm artery, iliac (H)     father had AAA surgery     Anxiety      Atrial fibrillation (H) 5/9/2011     Bicuspid aortic valve      Coronary artery disease     ablation 2011     Depressive disorder      Diverticulitis      Gastro-oesophageal reflux disease      Hearing loss      History of spinal cord injury      Hypertension      Other chronic pain      Polymyalgia rheumatica (H)      Prostate infection      Restless legs      S/P nasal septoplasty     1998     Seasonal affective disorder (H)      Sleep apnea      Tachycardia     exercise initiated     Past Surgical History:   Procedure Laterality Date     BACK SURGERY      cervical fusion 2005     BIOPSY ARTERY TEMPORAL Left 4/4/2019    Procedure: Left Temporal Artery Biopsy;  Surgeon: Bette Curry MD;  Location: [facility] OR     BLEPHAROPLASTY BILATERAL  2/27/2012    Procedure:BLEPHAROPLASTY BILATERAL; BILATERAL UPPER LID BLEPHAROPLASTY ; Surgeon:JESSE LINDSEY; Location:[facility]     CARDIAC SURGERY      stent 2003     CYSTOSCOPY       INCISION AND DRAINAGE ABSCESS  SCROTUM, COMBINED N/A 4/18/2020    Procedure: INCISION AND DRAINAGE of SCROTAL CYST;  Surgeon: Cy Lopez MD;  Location: [facility] OR     ORTHOPEDIC SURGERY      bilat hip replaced 2008,2009     PROSTATE SURGERY       Current Outpatient Medications   Medication Sig Dispense Refill     Amphetamine-Dextroamphetamine (ADDERALL PO) Take 10 mg by mouth daily as needed        apixaban ANTICOAGULANT (ELIQUIS) 5 MG tablet Take 1 tablet (5 mg) by mouth 2 times daily 180 tablet 3     carvedilol (COREG) 3.125 MG tablet TK 1 T PO BID WITH MEALS  3     leflunomide (ARAVA) 20 MG tablet 10 mg   0     sildenafil (VIAGRA) 100 MG cap/tab Take 1 tablet (100 mg) by mouth daily as needed for erectile dysfunction Do not take with NTG medication 10 tablet 1     traZODone (DESYREL) 100 MG tablet Take 50 mg by mouth every evening        gabapentin (NEURONTIN) 300 MG capsule Take 900 mg by mouth At Bedtime   1     levofloxacin (LEVAQUIN) 500 MG tablet Take 1 tablet (500 mg) by mouth daily (Patient not taking: Reported on 7/31/2020) 10 tablet 0      Allergies   Allergen Reactions     Oxycodone Other (See Comments)     Other reaction(s): Hallucinations  Hallucinations.  Other reaction(s): Hallucinations  Other reaction(s): Hallucinations  Hallucinations.  Other reaction(s): Hallucinations     Hmg-Coa-R Inhibitors Muscle Pain (Myalgia)     Other reaction(s): Myalgia     Sulfasalazine      Sulfa Drugs Rash     Social History     Occupational History     Not on file   Tobacco Use     Smoking status: Never Smoker     Smokeless tobacco: Never Used   Substance and Sexual Activity     Alcohol use: Yes     Comment: 1-2 alcoholic beverages per month     Drug use: No     Sexual activity: Not on file     Comment:        Recent Labs   Lab Test 04/19/20  0832 04/18/20  0631 04/17/20  1436 04/15/20  1250   WBC 8.8 8.1 9.5 10.9   HGB 10.3* 11.1* 11.3* 12.0*   HCT 34.7* 37.0* 36.9* 38.3*    285 306 327     Recent Labs   Lab Test  04/18/20  0631 04/17/20  1436 04/15/20  1250 12/26/19  0912 09/06/16  1413    141 137  --   --    POTASSIUM 3.7 3.7 3.8  --   --    CHLORIDE 111* 109 106  --   --    CO2 26 24 25  --   --    ANIONGAP 7 8 6  --   --    GLC 96 89 87  --  100.0   BUN 13 15 15  --   --    CR 0.83 0.87 0.81  --   --    GFRESTIMATED 87 85 87 83  --    GFRESTBLACK >90 >90 >90 >90  --    ABDULKADIR 8.5 8.8 8.8  --   --      Recent Labs   Lab Test 09/19/12  0959   COLOR Yellow   APPEARANCE Clear   URINEGLC Negative   URINEBILI Negative   URINEKETONE Negative   SG 1.016   UBLD Negative   URINEPH 5.0   PROTEIN Negative   NITRITE Negative   LEUKEST Small*   RBCU 0   WBCU 2       I, Josiah Lopez, reviewed these laboratory values.    CC  Patient Care Team:  Lisa Mcleod MD as PCP - Zulay Ambriz MD as MD (Urology)  Reta Gillis, RN as Registered Nurse (Urology)  Abe Valencia MD as Referring Physician (Family Practice)  Zulay Akins, RN as Specialty Care Coordinator (Cardiology)  Lior Willson MD as MD (Clinical Cardiac Electrophysiology)  SELF, REFERRED    Copy to patient  CHIO WOLFE  [id number] [facility] [id number]

## 2020-09-11 ENCOUNTER — VIRTUAL VISIT (OUTPATIENT)
Dept: UROLOGY | Facility: CLINIC | Age: 75
End: 2020-09-11
Payer: MEDICARE

## 2020-09-11 DIAGNOSIS — N49.2 SCROTAL ABSCESS: Primary | ICD-10-CM

## 2020-09-11 PROBLEM — Z96.643 STATUS POST TOTAL REPLACEMENT OF BOTH HIPS: Status: ACTIVE | Noted: 2020-07-23

## 2020-09-11 PROBLEM — M47.816 LUMBAR SPONDYLOSIS: Status: ACTIVE | Noted: 2020-08-28

## 2020-09-11 PROBLEM — C61 PROSTATE CANCER (H): Status: ACTIVE | Noted: 2018-11-09

## 2020-09-11 PROBLEM — G47.30 SLEEP APNEA: Status: ACTIVE | Noted: 2020-09-11

## 2020-09-11 PROBLEM — A41.9 SEPSIS, UNSPECIFIED ORGANISM (H): Status: ACTIVE | Noted: 2017-08-14

## 2020-09-11 PROBLEM — I10 ESSENTIAL HYPERTENSION: Status: ACTIVE | Noted: 2017-10-04

## 2020-09-11 PROBLEM — M25.559 GREATER TROCHANTERIC PAIN SYNDROME: Status: ACTIVE | Noted: 2020-08-28

## 2020-09-11 PROBLEM — M76.11 PSOAS TENDINITIS OF RIGHT SIDE: Status: ACTIVE | Noted: 2020-08-28

## 2020-09-11 PROBLEM — I25.10 ATHEROSCLEROTIC HEART DISEASE: Status: ACTIVE | Noted: 2017-10-04

## 2020-09-11 PROBLEM — Z98.890 H/O PROSTATE BIOPSY: Status: ACTIVE | Noted: 2020-09-11

## 2020-09-11 PROBLEM — G25.81 RESTLESS LEG SYNDROME: Status: ACTIVE | Noted: 2019-06-04

## 2020-09-11 PROBLEM — I72.3 ILIAC ANEURYSM (H): Status: ACTIVE | Noted: 2018-11-13

## 2020-09-11 PROBLEM — E78.00 HYPERCHOLESTEROLEMIA: Status: ACTIVE | Noted: 2020-09-11

## 2020-09-11 PROBLEM — G47.33 OSA (OBSTRUCTIVE SLEEP APNEA): Status: ACTIVE | Noted: 2019-06-04

## 2020-09-11 PROBLEM — Z98.1 S/P CERVICAL SPINAL FUSION: Status: ACTIVE | Noted: 2020-08-13

## 2020-09-11 RX ORDER — DOXYCYCLINE 100 MG/1
100 TABLET ORAL
COMMUNITY
Start: 2020-09-02 | End: 2020-09-16

## 2020-09-11 RX ORDER — ALPRAZOLAM 1 MG
TABLET ORAL
COMMUNITY
Start: 2020-07-30

## 2020-09-11 RX ORDER — ACETAMINOPHEN 500 MG
1000 TABLET ORAL
COMMUNITY
Start: 2020-08-25

## 2020-09-11 RX ORDER — AMOXICILLIN 875 MG
875 TABLET ORAL
COMMUNITY
Start: 2020-09-02 | End: 2020-09-11

## 2020-09-11 RX ORDER — OXYCODONE HYDROCHLORIDE 5 MG/1
5 TABLET ORAL
COMMUNITY
Start: 2020-09-09

## 2020-09-11 ASSESSMENT — PAIN SCALES - GENERAL: PAINLEVEL: NO PAIN (0)

## 2020-09-11 NOTE — PROGRESS NOTES
"Video Visit Technology for this patient: Marcy not working, patient has smart device, please try [employer] Video with patient phone number [phone]      Cy Seymour is a 75 year old male who is being evaluated via a billable video visit.      The patient has been notified of following:     \"This video visit will be conducted via a call between you and your physician/provider. We have found that certain health care needs can be provided without the need for an in-person physical exam.  This service lets us provide the care you need with a video conversation.  If a prescription is necessary we can send it directly to your pharmacy.  If lab work is needed we can place an order for that and you can then stop by our lab to have the test done at a later time.    Video visits are billed at different rates depending on your insurance coverage.  Please reach out to your insurance provider with any questions.    If during the course of the call the physician/provider feels a video visit is not appropriate, you will not be charged for this service.\"    Patient has given verbal consent for Video visit? Yes  How would you like to obtain your AVS? MyChart  If you are dropped from the video visit, the video invite should be resent to: Text to cell phone: [phone]  Will anyone else be joining your video visit? No        Video-Visit Details    Type of service:  Video Visit    Video Start Time: 921  Video End Time: 9:32 AM    Originating Location (pt. Location): Home    Distant Location (provider location):  [facility] AND [facility] PROSTATE [facility] UROLOGIC [facility]     Platform used for Video Visit: Jamila Lopez MD      "

## 2020-09-11 NOTE — LETTER
"9/11/2020       RE: Cy Seymour  [id number]   [facility] [address]     Dear Colleague,    Thank you for referring your patient, Cy Seymour, to the [facility] UROLOGY AND INST FOR PROSTATE AND UROLOGIC CANCERS at [facility]. Please see a copy of my visit note below.    Video Visit Technology for this patient: AmWell not working, patient has smart device, please try [employer] Video with patient phone number [phone]      Cy Seymour is a 75 year old male who is being evaluated via a billable video visit.      The patient has been notified of following:     \"This video visit will be conducted via a call between you and your physician/provider. We have found that certain health care needs can be provided without the need for an in-person physical exam.  This service lets us provide the care you need with a video conversation.  If a prescription is necessary we can send it directly to your pharmacy.  If lab work is needed we can place an order for that and you can then stop by our lab to have the test done at a later time.    Video visits are billed at different rates depending on your insurance coverage.  Please reach out to your insurance provider with any questions.    If during the course of the call the physician/provider feels a video visit is not appropriate, you will not be charged for this service.\"    Patient has given verbal consent for Video visit? Yes  How would you like to obtain your AVS? MyChart  If you are dropped from the video visit, the video invite should be resent to: Text to cell phone: [phone]  Will anyone else be joining your video visit? No        Video-Visit Details    Type of service:  Video Visit    Video Start Time: 921  Video End Time: 9:32 AM    Originating Location (pt. Location): Home    Distant Location (provider location):  [facility] UROLOGY AND [facility] FOR PROSTATE AND UROLOGIC CANCERS     Platform used " for Video Visit: Jamila Lopez MD        ASSESSMENT AND PLAN  Prostate Cancer  -follow-up with PSA late 2020 or early 2021.      Perineal Cyst  Drained 4/18/20.  The amount of pus has increased and his primary care physician started antibiotic.  This has helped.  I will plan for follow-up in 6-8 weeks with in person visit and he will work on his neck issue in the interim.  __________________________________________________    CHIEF COMPLAINT  It was my pleasure to see Cy Seymour who is a 75 year old male here for prostate cancer and history of perineal cyst.      HPI  Since his last visit, the amount of pus has increased from his perineal area and his primary care physician started antibiotic.  This has helped, but in the past, the drainage resumes after the antibiotic.    A few weeks ago he fell and hit his head in the shower.  This may require surgery.  This is his priority right now.    8/11/17:  Rebecca 3+3 prostate cancer. 1 core 15%.   Complicated by URINARY TRACT INFECTION with fever.      PSA   Date Value Ref Range Status   05/01/2020 2.98 0 - 4 ug/L Final     Comment:     Assay Method:  Chemiluminescence using Siemens Vista analyzer   04/06/2018 3.57 0 - 4 ug/L Final     Comment:     Assay Method:  Chemiluminescence using Siemens Vista analyzer   09/06/2016 3.38 0 - 4 ug/L Final     Comment:     Assay Method:  Chemiluminescence using Siemens Vista analyzer   04/04/2015 6.77 (H) 0 - 4 ug/L Final   01/21/2015 8.59 (H) 0 - 4 ug/L Final   09/19/2012 2.94 0 - 4 ug/L Final     Comment:     PSA results are about 7% lower than our prior method due to a methodology   change   on August 30, 2011.   07/07/2009 0.84 0 - 4 ug/L Final     No components found for: [id number]      RADIOLOGIC IMAGING  I reviewed the recent radiologic imaging and reports described below.  CORY Lopez  MRI PROSTATE: 9/30/2019 3:31 PM     CLINICAL HISTORY: Prostate cancer, T1/T2, staging. 74-year-old male  with  prostate biopsy on 8/11/2017 which demonstrated left lateral base  Rebecca 3+3 adenocarcinoma.     Most Recent PSA: 2.3 ug/L     Comparison: 8/13/2016, 7/27/2017.     TECHNIQUE:  The following sequences were obtained: High-resolution axial  T2-weighted, coronal T2-weighted, 3D volumetric T2-weighted, axial  pre-contrast T1, axial diffusion-weighted, axial apparent diffusion  coefficient and axial dynamic contrast-enhanced T1. Postcontrast  images were evaluated on a separate workstation to evaluate dynamic  contrast enhancement. The technique of this exam is PI-RADS v2.1  compliant. Contrast dose: 10 mL Gadavist.     FINDINGS:  Size: 3.8 x 5.9 x 4.8 cm for a volume of 56 grams  Hemorrhage: Absent  Peripheral zone: Heterogeneous on T2-weighted images. Regions of  mildly decreased signal on ADC or DWI which are best characterized as  PI-RADS 2 without highly suspicious lesion. Herniated left  anterolateral and right lateral BPH nodules.  Transition zone: Enlarged with BPH changes. Transition zone nodules  which are circumscribed or mostly encapsulated without diffusion  restriction. PI-RADS 2. No highly suspicious nodules.     Neurovascular bundles: No neurovascular bundle involvement by  malignancy.  Seminal vesicles: No seminal vesicle involvement by malignancy.  Lymph nodes: No lymph node involvement.  Bones: No suspicious lesions.  Other pelvic organs: Colonic diverticulosis. Postoperative changes of  bilateral hip arthroplasties; the associated susceptibility artifact  obscures detail in the pelvis, especially on the diffusion-weighted  images. Left inguinal hernia.                                                                         IMPRESSION:  1. Based on the most suspicious abnormality, this exam is  characterized as PIRADS 2 - Clinically significant cancer is unlikely  to be present.   2. No suspicious adenopathy or evidence of pelvic metastases.      Pathology  Collected: 8/11/2017   Received: 8/11/2017    Reported: 8/14/2017 11:16   Ordering Phy(s): FELIX BROWN     For improved result formatting, select 'View Enhanced Report Format'   under Linked Documents section.     SPECIMEN(S):   A: Prostate biopsy, left lateral base   B: Prostate biopsy, left lateral mid   C: Prostate biopsy, left lateral apex   D: Prostate biopsy, left base   E: Prostate biopsy, left mid   F: Prostate biopsy, left apex   G: Prostate biopsy, right lateral base   H: Prostate biopsy, right lateral mid   I: Prostate biopsy, right lateral apex   J: Prostate biopsy, right base   K: Prostate biopsy, right mid   L: Prostate biopsy, right apex     FINAL DIAGNOSIS:   A. Prostate biopsy, left lateral base:   - Prostatic adenocarcinoma   - [place] score 6 (3+3)   - Grade Group 1   - Extent: Involves one core (2 mm, 15%)     B. Prostate biopsy, left lateral mid:   - Benign prostate tissue     C. Prostate biopsy, left lateral apex:   - Benign prostate tissue     D. Prostate biopsy, left base:   - Benign prostate tissue     E. Prostate biopsy, left mid:   - Benign prostate tissue     F. Prostate biopsy, left apex:   - Benign prostate tissue     G. Prostate biopsy, right lateral base:   - Benign prostate tissue     H. Prostate biopsy, right lateral mid:   - Benign prostate tissue     I. Prostate biopsy, right lateral apex:   - Benign prostate tissue     J. Prostate biopsy, right base:   - Benign prostate tissue     K. Prostate biopsy, right mid:   - Benign prostate tissue     L. Prostate biopsy, right apex:   - Benign prostate tissue       PHYSICAL EXAM  There were no vitals filed for this visit.  Wt Readings from Last 3 Encounters:   07/31/20 106.6 kg (235 lb)   05/01/20 108 kg (238 lb)   04/18/20 108 kg (238 lb 1.6 oz)     Constitutional: Alert, no acute distress  Psychiatric: Normal mood and affect  Head: Normocephalic.   Neck: Neck is symmetric and without large visually seen mass.  ENT: No perioral edema or erythema.  Respiratory: Breathing  is not labored   Skin: no suspicious lesions or rashes on face  Extremities: Upper extremities are moving easily.  Neurologic: Cranial nerves grossly intact.   : Deferred      Family History   Problem Relation Age of Onset     Arthritis Mother      Hypertension Mother      Hypertension Father      Heart Failure Father      Heart Disease Maternal Grandfather      Heart Disease Paternal Grandmother       Patient Active Problem List    Diagnosis Date Noted     H/O prostate biopsy 09/11/2020     Priority: Medium     Sleep apnea 09/11/2020     Priority: Medium     Overview:   Not compliant with CPAP. ? Narcolepsy has been raised.        Hypercholesterolemia 09/11/2020     Priority: Medium     Greater trochanteric pain syndrome 08/28/2020     Priority: Medium     Right.       Lumbar spondylosis 08/28/2020     Priority: Medium     Psoas tendinitis of right side 08/28/2020     Priority: Medium     S/P cervical spinal fusion 08/13/2020     Priority: Medium     Dr Ferris, ~2000.       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       Perineal abscess 04/17/2020     Priority: Medium     Scrotal abscess 04/17/2020     Priority: Medium     Incision and drainage 4/18/2020 at [facility].   US 4/15 ULTRASOUND TESTICULAR AND SCROTUM WITH DOPPLER LIMITED (4/14/2020)  IMPRESSION:  1.  Complex fluid collections at the left inferior and lateral scrotal wall worrisome for abscess and phlegmon. See above for measurements.  2.  Small left epididymal cyst.  3.  Left-sided varicocele.  4.  No testicular parenchymal lesion or acute testicular abnormality  is seen.       IVANA (obstructive sleep apnea) 06/04/2019     Priority: Medium     Not on CPAP. Dr Dey.       Restless leg syndrome 06/04/2019     Priority: Medium     Controlled on Gabapentin hs. Dr Dey.       Iliac aneurysm (H) 11/13/2018     Priority: Medium     Overview:   Ultrasound aorta 12/2017   Impression:   1. Ectasia of the  distal abdominal aorta measuring 2.8 cm in diameter. This compares with the measurement of 2.5 cm on the prior study.  2. Aneurysmal dilatation of the left common iliac artery measuring up to 2.8 cm in diameter. This compares with a measurement of 2.5 cm on the prior study, although the difference may in part be due to differences in measurement technique rather than true increase in size.  [facility]       Prostate cancer (H) 11/09/2018     Priority: Medium     Overview:   Follows with Dr Lopez - has PSA 9.5 and grade group 1 prostate cancer, which is currently under surveillance.     Prostate Bx 8/11/2018 at [facility] - complicated by E coli sepsis and orchitis with reactive hydrocele       Atherosclerotic heart disease 10/04/2017     Priority: Medium     Overview:   Coronary artery disease - follows with Dr. Laboy ([facility])     S/p PCI to RCA 2003    S/p PCI to LAD 2012    Chronically occluded RCA.     Last angiogramm 2015   DIAGNOSTIC SUMMARY    The LMCA is free of significant disease.    The LAD is free of significant disease.    The Circumflex is free of significant disease.    The 2nd Marginal is free of significant disease.    50% stenosis in the Proximal RCA    100% stenosis in the Proximal RCA (Restenosis - Drug Eluting Stent)  IMPRESSION      if small non- dominant RCA - occluded since at least 2013    No other significant disease    Unable to open  no purchase give acute angle of stent    Echo 10/2017 with EF 50-55%        Essential hypertension 10/04/2017     Priority: Medium     Sepsis, unspecified organism (H) 08/14/2017     Priority: Medium     10/1/2019 SNOMED/IMO Regulatory Updates       Preventative health care 09/06/2016     Priority: Medium     Bilateral sensorineural hearing loss 09/06/2016     Priority: Medium     Essential hypertension with goal blood pressure less than 140/90 09/06/2016     Priority: Medium     Hyperlipidemia LDL goal <100  09/06/2016     Priority: Medium     Other male erectile dysfunction 07/21/2016     Priority: Medium     Hypertension 05/30/2014     Priority: Medium     Peripheral neuropathy 05/30/2014     Priority: Medium     Varicose veins of bilateral lower extremities with other complications 11/05/2013     Priority: Medium     Unstable angina (H) 11/08/2012     Priority: Medium     Overview:   90% PRox LAD.  CRYSTAL stent placed. Chronically occluded mid RCA at stent. Smaller RCA that does get some L to R collaterals.  11/07/12       ACP (advance care planning) 11/06/2012     Priority: Medium     Patient has identified Health Care Agent(s): No  Add Health Care Agents: No  Patient has Advance Care Plan Documents (Health Care Directive, POLST): No, .    Patient has identified Specific Treatment Preferences: Yes   Specific Treatment Preferences: a.) Code Status:  CPR/Attempt Resuscitation       ADHD (attention deficit hyperactivity disorder) 09/19/2012     Priority: Medium     CAD (coronary artery disease) 09/19/2012     Priority: Medium     Insomnia 09/19/2012     Priority: Medium     Major depression, recurrent (H) 09/19/2012     Priority: Medium     BPH (benign prostatic hyperplasia) 09/19/2012     Priority: Medium     Paroxysmal atrial fibrillation (H) 07/29/2011     Priority: Medium     Atrial fibrillation (H) 07/29/2011     Priority: Medium     Overview:   s/p ablation 2011       Bicuspid aortic valve 04/13/2011     Priority: Medium     Overview:   With mild stenosis, follows with Dr. Jazlyn Charles.        Atrial flutter (H) 03/22/2011     Priority: Medium     S/P prosthetic total arthroplasty of the hip 10/12/2009     Priority: Medium     Diverticulitis of colon 06/11/2009     Priority: Medium     Hip joint replacement by other means 07/07/2008     Priority: Medium     Polymyalgia rheumatica (H) 01/01/2002     Priority: Medium     Follows with Dr Kingsley. On Arava, has been on low dose Prednisone for many years.       Past  Medical History:   Diagnosis Date     ADHD (attention deficit hyperactivity disorder)      Aneurysm artery, iliac (H)     father had AAA surgery     Anxiety      Atrial fibrillation (H) 5/9/2011     Bicuspid aortic valve      Coronary artery disease     ablation 2011     Depressive disorder      Diverticulitis      Gastro-oesophageal reflux disease      Hearing loss      History of spinal cord injury      Hypertension      Other chronic pain      Polymyalgia rheumatica (H)      Prostate infection      Restless legs      S/P nasal septoplasty     1998     Seasonal affective disorder (H)      Sleep apnea      Tachycardia     exercise initiated     Past Surgical History:   Procedure Laterality Date     BACK SURGERY      cervical fusion 2005     BIOPSY ARTERY TEMPORAL Left 4/4/2019    Procedure: Left Temporal Artery Biopsy;  Surgeon: Bette Curry MD;  Location: [facility] OR     BLEPHAROPLASTY BILATERAL  2/27/2012    Procedure:BLEPHAROPLASTY BILATERAL; BILATERAL UPPER LID BLEPHAROPLASTY ; Surgeon:JESSE LINDSEY; Location:[facility]     CARDIAC SURGERY      stent 2003     CYSTOSCOPY       INCISION AND DRAINAGE ABSCESS SCROTUM, COMBINED N/A 4/18/2020    Procedure: INCISION AND DRAINAGE of SCROTAL CYST;  Surgeon: Cy Lopez MD;  Location: [facility] OR     ORTHOPEDIC SURGERY      bilat hip replaced 2008,2009     PROSTATE SURGERY       Current Outpatient Medications   Medication Sig Dispense Refill     acetaminophen (TYLENOL) 500 MG tablet Take 1,000 mg by mouth       amoxicillin (AMOXIL) 875 MG tablet Take 875 mg by mouth       doxycycline monohydrate (ADOXA) 100 MG tablet Take 100 mg by mouth       oxyCODONE (ROXICODONE) 5 MG tablet Take 5 mg by mouth       ALPRAZolam (XANAX) 1 MG tablet        Amphetamine-Dextroamphetamine (ADDERALL PO) Take 10 mg by mouth daily as needed        apixaban ANTICOAGULANT (ELIQUIS) 5 MG tablet Take 1 tablet (5 mg) by mouth 2 times daily 180 tablet 3     carvedilol (COREG) 3.125 MG tablet TK 1  T PO BID WITH MEALS  3     gabapentin (NEURONTIN) 300 MG capsule Take 900 mg by mouth At Bedtime   1     leflunomide (ARAVA) 20 MG tablet 10 mg   0     levofloxacin (LEVAQUIN) 500 MG tablet Take 1 tablet (500 mg) by mouth daily (Patient not taking: Reported on 7/31/2020) 10 tablet 0     sildenafil (VIAGRA) 100 MG cap/tab Take 1 tablet (100 mg) by mouth daily as needed for erectile dysfunction Do not take with NTG medication 10 tablet 1     traZODone (DESYREL) 100 MG tablet Take 50 mg by mouth every evening         Allergies   Allergen Reactions     Oxycodone Other (See Comments)     Other reaction(s): Hallucinations  Hallucinations.  Other reaction(s): Hallucinations  Other reaction(s): Hallucinations  Hallucinations.  Other reaction(s): Hallucinations     Hmg-Coa-R Inhibitors Muscle Pain (Myalgia)     Other reaction(s): Myalgia     Sulfasalazine      Sulfa Drugs Rash     Social History     Occupational History     Not on file   Tobacco Use     Smoking status: Never Smoker     Smokeless tobacco: Never Used   Substance and Sexual Activity     Alcohol use: Yes     Comment: 1-2 alcoholic beverages per month     Drug use: No     Sexual activity: Not on file     Comment:        Recent Labs   Lab Test 04/19/20  0832 04/18/20  0631 04/17/20  1436 04/15/20  1250   WBC 8.8 8.1 9.5 10.9   HGB 10.3* 11.1* 11.3* 12.0*   HCT 34.7* 37.0* 36.9* 38.3*    285 306 327     Recent Labs   Lab Test 04/18/20  0631 04/17/20  1436 04/15/20  1250 12/26/19  0912 09/06/16  1413    141 137  --   --    POTASSIUM 3.7 3.7 3.8  --   --    CHLORIDE 111* 109 106  --   --    CO2 26 24 25  --   --    ANIONGAP 7 8 6  --   --    GLC 96 89 87  --  100.0   BUN 13 15 15  --   --    CR 0.83 0.87 0.81  --   --    GFRESTIMATED 87 85 87 83  --    GFRESTBLACK >90 >90 >90 >90  --    ABDULKADIR 8.5 8.8 8.8  --   --      Recent Labs   Lab Test 09/19/12  0959   COLOR Yellow   APPEARANCE Clear   URINEGLC Negative   URINEBILI Negative   URINEKETONE  Negative   SG 1.016   UBLD Negative   URINEPH 5.0   PROTEIN Negative   NITRITE Negative   LEUKEST Small*   RBCU 0   WBCU 2       I, Josiah Lopez, reviewed these laboratory values.    CC  Patient Care Team:  Lisa Mcleod MD as PCP - General  LopezZulay MD as MD (Urology)  Reta Gillis, RN as Registered Nurse (Urology)  Abe Valencia MD as Referring Physician (Family Practice)  Zulay Akins, RN as Specialty Care Coordinator (Cardiology)  Lior Willson MD as MD (Clinical Cardiac Electrophysiology)  SELF, REFERRED    Copy to patient  CHIO QUIROSSHADI  [address]   [facility] [id number]

## 2020-09-11 NOTE — PROGRESS NOTES
ASSESSMENT AND PLAN  Prostate Cancer  -follow-up with PSA late 2020 or early 2021.      Perineal Cyst  Drained 4/18/20.  The amount of pus has increased and his primary care physician started antibiotic.  This has helped.  I will plan for follow-up in 6-8 weeks with in person visit and he will work on his neck issue in the interim.  __________________________________________________    CHIEF COMPLAINT  It was my pleasure to see Cy Seymour who is a 75 year old male here for prostate cancer and history of perineal cyst.      HPI  Since his last visit, the amount of pus has increased from his perineal area and his primary care physician started antibiotic.  This has helped, but in the past, the drainage resumes after the antibiotic.    A few weeks ago he fell and hit his head in the shower.  This may require surgery.  This is his priority right now.    8/11/17:  Rebecca 3+3 prostate cancer. 1 core 15%.   Complicated by URINARY TRACT INFECTION with fever.      PSA   Date Value Ref Range Status   05/01/2020 2.98 0 - 4 ug/L Final     Comment:     Assay Method:  Chemiluminescence using Siemens Vista analyzer   04/06/2018 3.57 0 - 4 ug/L Final     Comment:     Assay Method:  Chemiluminescence using Siemens Vista analyzer   09/06/2016 3.38 0 - 4 ug/L Final     Comment:     Assay Method:  Chemiluminescence using Siemens Vista analyzer   04/04/2015 6.77 (H) 0 - 4 ug/L Final   01/21/2015 8.59 (H) 0 - 4 ug/L Final   09/19/2012 2.94 0 - 4 ug/L Final     Comment:     PSA results are about 7% lower than our prior method due to a methodology   change   on August 30, 2011.   07/07/2009 0.84 0 - 4 ug/L Final     No components found for: ANA447      RADIOLOGIC IMAGING  I reviewed the recent radiologic imaging and reports described below.  CORY Lopez  MRI PROSTATE: 9/30/2019 3:31 PM     CLINICAL HISTORY: Prostate cancer, T1/T2, staging. 74-year-old male  with prostate biopsy on 8/11/2017 which demonstrated left lateral  base  [place] 3+3 adenocarcinoma.     Most Recent PSA: 2.3 ug/L     Comparison: 8/13/2016, 7/27/2017.     TECHNIQUE:  The following sequences were obtained: High-resolution axial  T2-weighted, coronal T2-weighted, 3D volumetric T2-weighted, axial  pre-contrast T1, axial diffusion-weighted, axial apparent diffusion  coefficient and axial dynamic contrast-enhanced T1. Postcontrast  images were evaluated on a separate workstation to evaluate dynamic  contrast enhancement. The technique of this exam is PI-RADS v2.1  compliant. Contrast dose: 10 mL Gadavist.     FINDINGS:  Size: 3.8 x 5.9 x 4.8 cm for a volume of 56 grams  Hemorrhage: Absent  Peripheral zone: Heterogeneous on T2-weighted images. Regions of  mildly decreased signal on ADC or DWI which are best characterized as  PI-RADS 2 without highly suspicious lesion. Herniated left  anterolateral and right lateral BPH nodules.  Transition zone: Enlarged with BPH changes. Transition zone nodules  which are circumscribed or mostly encapsulated without diffusion  restriction. PI-RADS 2. No highly suspicious nodules.     Neurovascular bundles: No neurovascular bundle involvement by  malignancy.  Seminal vesicles: No seminal vesicle involvement by malignancy.  Lymph nodes: No lymph node involvement.  Bones: No suspicious lesions.  Other pelvic organs: Colonic diverticulosis. Postoperative changes of  bilateral hip arthroplasties; the associated susceptibility artifact  obscures detail in the pelvis, especially on the diffusion-weighted  images. Left inguinal hernia.                                                                         IMPRESSION:  1. Based on the most suspicious abnormality, this exam is  characterized as PIRADS 2 - Clinically significant cancer is unlikely  to be present.   2. No suspicious adenopathy or evidence of pelvic metastases.      Pathology  Collected: 8/11/2017   Received: 8/11/2017   Reported: 8/14/2017 11:16   Ordering Phy(s): FELIX  KAREN BROWN     For improved result formatting, select 'View Enhanced Report Format'   under Linked Documents section.     SPECIMEN(S):   A: Prostate biopsy, left lateral base   B: Prostate biopsy, left lateral mid   C: Prostate biopsy, left lateral apex   D: Prostate biopsy, left base   E: Prostate biopsy, left mid   F: Prostate biopsy, left apex   G: Prostate biopsy, right lateral base   H: Prostate biopsy, right lateral mid   I: Prostate biopsy, right lateral apex   J: Prostate biopsy, right base   K: Prostate biopsy, right mid   L: Prostate biopsy, right apex     FINAL DIAGNOSIS:   A. Prostate biopsy, left lateral base:   - Prostatic adenocarcinoma   - Rebecca score 6 (3+3)   - Grade Group 1   - Extent: Involves one core (2 mm, 15%)     B. Prostate biopsy, left lateral mid:   - Benign prostate tissue     C. Prostate biopsy, left lateral apex:   - Benign prostate tissue     D. Prostate biopsy, left base:   - Benign prostate tissue     E. Prostate biopsy, left mid:   - Benign prostate tissue     F. Prostate biopsy, left apex:   - Benign prostate tissue     G. Prostate biopsy, right lateral base:   - Benign prostate tissue     H. Prostate biopsy, right lateral mid:   - Benign prostate tissue     I. Prostate biopsy, right lateral apex:   - Benign prostate tissue     J. Prostate biopsy, right base:   - Benign prostate tissue     K. Prostate biopsy, right mid:   - Benign prostate tissue     L. Prostate biopsy, right apex:   - Benign prostate tissue       PHYSICAL EXAM  There were no vitals filed for this visit.  Wt Readings from Last 3 Encounters:   07/31/20 106.6 kg (235 lb)   05/01/20 108 kg (238 lb)   04/18/20 108 kg (238 lb 1.6 oz)     Constitutional: Alert, no acute distress  Psychiatric: Normal mood and affect  Head: Normocephalic.   Neck: Neck is symmetric and without large visually seen mass.  ENT: No perioral edema or erythema.  Respiratory: Breathing is not labored   Skin: no suspicious lesions or  rashes on face  Extremities: Upper extremities are moving easily.  Neurologic: Cranial nerves grossly intact.   : Deferred      Family History   Problem Relation Age of Onset     Arthritis Mother      Hypertension Mother      Hypertension Father      Heart Failure Father      Heart Disease Maternal Grandfather      Heart Disease Paternal Grandmother       Patient Active Problem List    Diagnosis Date Noted     H/O prostate biopsy 09/11/2020     Priority: Medium     Sleep apnea 09/11/2020     Priority: Medium     Overview:   Not compliant with CPAP. ? Narcolepsy has been raised.        Hypercholesterolemia 09/11/2020     Priority: Medium     Greater trochanteric pain syndrome 08/28/2020     Priority: Medium     Right.       Lumbar spondylosis 08/28/2020     Priority: Medium     Psoas tendinitis of right side 08/28/2020     Priority: Medium     S/P cervical spinal fusion 08/13/2020     Priority: Medium     Dr Ferris, ~2000.       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       Perineal abscess 04/17/2020     Priority: Medium     Scrotal abscess 04/17/2020     Priority: Medium     Incision and drainage 4/18/2020 at [facility].   US 4/15 ULTRASOUND TESTICULAR AND SCROTUM WITH DOPPLER LIMITED (4/14/2020)  IMPRESSION:  1.  Complex fluid collections at the left inferior and lateral scrotal wall worrisome for abscess and phlegmon. See above for measurements.  2.  Small left epididymal cyst.  3.  Left-sided varicocele.  4.  No testicular parenchymal lesion or acute testicular abnormality  is seen.       IVANA (obstructive sleep apnea) 06/04/2019     Priority: Medium     Not on CPAP. Dr Dey.       Restless leg syndrome 06/04/2019     Priority: Medium     Controlled on Gabapentin hs. Dr Dey.       Iliac aneurysm (H) 11/13/2018     Priority: Medium     Overview:   Ultrasound aorta 12/2017   Impression:   1. Ectasia of the distal abdominal aorta measuring 2.8 cm in diameter.  This compares with the measurement of 2.5 cm on the prior study.  2. Aneurysmal dilatation of the left common iliac artery measuring up to 2.8 cm in diameter. This compares with a measurement of 2.5 cm on the prior study, although the difference may in part be due to differences in measurement technique rather than true increase in size.  [facility]       Prostate cancer (H) 11/09/2018     Priority: Medium     Overview:   Follows with Dr Lopez - has PSA 9.5 and grade group 1 prostate cancer, which is currently under surveillance.     Prostate Bx 8/11/2018 at [facility] - complicated by E coli sepsis and orchitis with reactive hydrocele       Atherosclerotic heart disease 10/04/2017     Priority: Medium     Overview:   Coronary artery disease - follows with Dr. Laboy ([facility])     S/p PCI to RCA 2003    S/p PCI to LAD 2012    Chronically occluded RCA.     Last angiogramm 2015   DIAGNOSTIC SUMMARY    The LMCA is free of significant disease.    The LAD is free of significant disease.    The Circumflex is free of significant disease.    The 2nd Marginal is free of significant disease.    50% stenosis in the Proximal RCA    100% stenosis in the Proximal RCA (Restenosis - Drug Eluting Stent)  IMPRESSION      if small non- dominant RCA - occluded since at least 2013    No other significant disease    Unable to open  no purchase give acute angle of stent    Echo 10/2017 with EF 50-55%        Essential hypertension 10/04/2017     Priority: Medium     Sepsis, unspecified organism (H) 08/14/2017     Priority: Medium     10/1/2019 SNOMED/IMO Regulatory Updates       Preventative health care 09/06/2016     Priority: Medium     Bilateral sensorineural hearing loss 09/06/2016     Priority: Medium     Essential hypertension with goal blood pressure less than 140/90 09/06/2016     Priority: Medium     Hyperlipidemia LDL goal <100 09/06/2016     Priority: Medium     Other male erectile  dysfunction 07/21/2016     Priority: Medium     Hypertension 05/30/2014     Priority: Medium     Peripheral neuropathy 05/30/2014     Priority: Medium     Varicose veins of bilateral lower extremities with other complications 11/05/2013     Priority: Medium     Unstable angina (H) 11/08/2012     Priority: Medium     Overview:   90% PRox LAD.  CRYSTAL stent placed. Chronically occluded mid RCA at stent. Smaller RCA that does get some L to R collaterals.  11/07/12       ACP (advance care planning) 11/06/2012     Priority: Medium     Patient has identified Health Care Agent(s): No  Add Health Care Agents: No  Patient has Advance Care Plan Documents (Health Care Directive, POLST): No, .    Patient has identified Specific Treatment Preferences: Yes   Specific Treatment Preferences: a.) Code Status:  CPR/Attempt Resuscitation       ADHD (attention deficit hyperactivity disorder) 09/19/2012     Priority: Medium     CAD (coronary artery disease) 09/19/2012     Priority: Medium     Insomnia 09/19/2012     Priority: Medium     Major depression, recurrent (H) 09/19/2012     Priority: Medium     BPH (benign prostatic hyperplasia) 09/19/2012     Priority: Medium     Paroxysmal atrial fibrillation (H) 07/29/2011     Priority: Medium     Atrial fibrillation (H) 07/29/2011     Priority: Medium     Overview:   s/p ablation 2011       Bicuspid aortic valve 04/13/2011     Priority: Medium     Overview:   With mild stenosis, follows with Dr. Jazlyn Charles.        Atrial flutter (H) 03/22/2011     Priority: Medium     S/P prosthetic total arthroplasty of the hip 10/12/2009     Priority: Medium     Diverticulitis of colon 06/11/2009     Priority: Medium     Hip joint replacement by other means 07/07/2008     Priority: Medium     Polymyalgia rheumatica (H) 01/01/2002     Priority: Medium     Follows with Dr Kingsley. On Arava, has been on low dose Prednisone for many years.       Past Medical History:   Diagnosis Date     ADHD (attention  deficit hyperactivity disorder)      Aneurysm artery, iliac (H)     father had AAA surgery     Anxiety      Atrial fibrillation (H) 5/9/2011     Bicuspid aortic valve      Coronary artery disease     ablation 2011     Depressive disorder      Diverticulitis      Gastro-oesophageal reflux disease      Hearing loss      History of spinal cord injury      Hypertension      Other chronic pain      Polymyalgia rheumatica (H)      Prostate infection      Restless legs      S/P nasal septoplasty     1998     Seasonal affective disorder (H)      Sleep apnea      Tachycardia     exercise initiated     Past Surgical History:   Procedure Laterality Date     BACK SURGERY      cervical fusion 2005     BIOPSY ARTERY TEMPORAL Left 4/4/2019    Procedure: Left Temporal Artery Biopsy;  Surgeon: Bette Curry MD;  Location: [facility] OR     BLEPHAROPLASTY BILATERAL  2/27/2012    Procedure:BLEPHAROPLASTY BILATERAL; BILATERAL UPPER LID BLEPHAROPLASTY ; Surgeon:JESSE LINDSEY; [facility]     CARDIAC SURGERY      stent 2003     CYSTOSCOPY       INCISION AND DRAINAGE ABSCESS SCROTUM, COMBINED N/A 4/18/2020    Procedure: INCISION AND DRAINAGE of SCROTAL CYST;  Surgeon: Cy Lopez MD;  Location: [facility] OR     ORTHOPEDIC SURGERY      bilat hip replaced 2008,2009     PROSTATE SURGERY       Current Outpatient Medications   Medication Sig Dispense Refill     acetaminophen (TYLENOL) 500 MG tablet Take 1,000 mg by mouth       amoxicillin (AMOXIL) 875 MG tablet Take 875 mg by mouth       doxycycline monohydrate (ADOXA) 100 MG tablet Take 100 mg by mouth       oxyCODONE (ROXICODONE) 5 MG tablet Take 5 mg by mouth       ALPRAZolam (XANAX) 1 MG tablet        Amphetamine-Dextroamphetamine (ADDERALL PO) Take 10 mg by mouth daily as needed        apixaban ANTICOAGULANT (ELIQUIS) 5 MG tablet Take 1 tablet (5 mg) by mouth 2 times daily 180 tablet 3     carvedilol (COREG) 3.125 MG tablet TK 1 T PO BID WITH MEALS  3     gabapentin (NEURONTIN) 300  MG capsule Take 900 mg by mouth At Bedtime   1     leflunomide (ARAVA) 20 MG tablet 10 mg   0     levofloxacin (LEVAQUIN) 500 MG tablet Take 1 tablet (500 mg) by mouth daily (Patient not taking: Reported on 7/31/2020) 10 tablet 0     sildenafil (VIAGRA) 100 MG cap/tab Take 1 tablet (100 mg) by mouth daily as needed for erectile dysfunction Do not take with NTG medication 10 tablet 1     traZODone (DESYREL) 100 MG tablet Take 50 mg by mouth every evening         Allergies   Allergen Reactions     Oxycodone Other (See Comments)     Other reaction(s): Hallucinations  Hallucinations.  Other reaction(s): Hallucinations  Other reaction(s): Hallucinations  Hallucinations.  Other reaction(s): Hallucinations     Hmg-Coa-R Inhibitors Muscle Pain (Myalgia)     Other reaction(s): Myalgia     Sulfasalazine      Sulfa Drugs Rash     Social History     Occupational History     Not on file   Tobacco Use     Smoking status: Never Smoker     Smokeless tobacco: Never Used   Substance and Sexual Activity     Alcohol use: Yes     Comment: 1-2 alcoholic beverages per month     Drug use: No     Sexual activity: Not on file     Comment:        Recent Labs   Lab Test 04/19/20  0832 04/18/20  0631 04/17/20  1436 04/15/20  1250   WBC 8.8 8.1 9.5 10.9   HGB 10.3* 11.1* 11.3* 12.0*   HCT 34.7* 37.0* 36.9* 38.3*    285 306 327     Recent Labs   Lab Test 04/18/20  0631 04/17/20  1436 04/15/20  1250 12/26/19  0912 09/06/16  1413    141 137  --   --    POTASSIUM 3.7 3.7 3.8  --   --    CHLORIDE 111* 109 106  --   --    CO2 26 24 25  --   --    ANIONGAP 7 8 6  --   --    GLC 96 89 87  --  100.0   BUN 13 15 15  --   --    CR 0.83 0.87 0.81  --   --    GFRESTIMATED 87 85 87 83  --    GFRESTBLACK >90 >90 >90 >90  --    ABDULKADIR 8.5 8.8 8.8  --   --      Recent Labs   Lab Test 09/19/12  0959   COLOR Yellow   APPEARANCE Clear   URINEGLC Negative   URINEBILI Negative   URINEKETONE Negative   SG 1.016   UBLD Negative   URINEPH 5.0   PROTEIN  Negative   NITRITE Negative   LEUKEST Small*   RBCU 0   WBCU 2       I, Josiah Lopez, reviewed these laboratory values.    CC  Patient Care Team:  Lisa Mcleod MD as PCP - General  LopezZulay moreno MD as MD (Urology)  Reta Gillis, RN as Registered Nurse (Urology)  Abe Valencia MD as Referring Physician ([facility])  Zulay Akins, RN as Specialty Care Coordinator (Cardiology)  Lior Willson MD as MD (Clinical Cardiac Electrophysiology)  SELF, REFERRED    Copy to patient  CHIO QUIROSSHADI  [id number]   [facility] [id number]

## 2020-11-14 ENCOUNTER — HEALTH MAINTENANCE LETTER (OUTPATIENT)
Age: 75
End: 2020-11-14

## 2020-12-02 ENCOUNTER — PRE VISIT (OUTPATIENT)
Dept: UROLOGY | Facility: CLINIC | Age: 75
End: 2020-12-02

## 2021-04-03 ENCOUNTER — HEALTH MAINTENANCE LETTER (OUTPATIENT)
Age: 76
End: 2021-04-03

## 2021-07-02 ENCOUNTER — TRANSFERRED RECORDS (OUTPATIENT)
Dept: HEALTH INFORMATION MANAGEMENT | Facility: CLINIC | Age: 76
End: 2021-07-02

## 2021-09-12 ENCOUNTER — HEALTH MAINTENANCE LETTER (OUTPATIENT)
Age: 76
End: 2021-09-12

## 2022-04-11 NOTE — PATIENT INSTRUCTIONS
Follow up with Dr. Lopez in 6-8 weeks (in clinic) for wound check.    Prostate cancer follow-up with PSA late 2020 or early 2021    It was a pleasure meeting with you today.  Thank you for allowing me and my team the privilege of caring for you today.  YOU are the reason we are here, and I truly hope we provided you with the excellent service you deserve.  Please let us know if there is anything else we can do for you so that we can be sure you are leaving completely satisfied with your care experience.        Kiley Ramey, CMA   
no

## 2022-04-24 ENCOUNTER — HEALTH MAINTENANCE LETTER (OUTPATIENT)
Age: 77
End: 2022-04-24

## 2022-08-30 ENCOUNTER — TRANSFERRED RECORDS (OUTPATIENT)
Dept: HEALTH INFORMATION MANAGEMENT | Facility: CLINIC | Age: 77
End: 2022-08-30

## 2022-10-31 ENCOUNTER — TELEPHONE (OUTPATIENT)
Dept: CARDIOLOGY | Facility: CLINIC | Age: 77
End: 2022-10-31

## 2022-10-31 NOTE — TELEPHONE ENCOUNTER
Generic VM left requesting pt to call back in regards to upcoming [facility]Scan and restricted medications.

## 2022-11-01 ENCOUNTER — TELEPHONE (OUTPATIENT)
Dept: NUCLEAR MEDICINE | Facility: CLINIC | Age: 77
End: 2022-11-01

## 2022-11-17 ENCOUNTER — ANCILLARY PROCEDURE (OUTPATIENT)
Dept: NUCLEAR MEDICINE | Facility: CLINIC | Age: 77
End: 2022-11-17
Attending: PSYCHIATRY & NEUROLOGY
Payer: MEDICARE

## 2022-11-17 DIAGNOSIS — R25.8 BRADYKINESIA: ICD-10-CM

## 2022-11-17 DIAGNOSIS — R25.1 TREMOR: ICD-10-CM

## 2022-11-17 PROCEDURE — A9584 IODINE I-123 IOFLUPANE: HCPCS

## 2022-11-17 PROCEDURE — 78803 RP LOCLZJ TUM SPECT 1 AREA: CPT

## 2022-11-17 RX ORDER — IOFLUPANE I-123 2 MCI/ML
5.25 INJECTION, SOLUTION INTRAVENOUS ONCE
Status: COMPLETED | OUTPATIENT
Start: 2022-11-17 | End: 2022-11-17

## 2022-11-17 RX ADMIN — IOFLUPANE I-123 5.25 MCI.: 2 INJECTION, SOLUTION INTRAVENOUS at 11:30

## 2022-11-19 ENCOUNTER — HEALTH MAINTENANCE LETTER (OUTPATIENT)
Age: 77
End: 2022-11-19

## 2023-04-20 NOTE — PATIENT INSTRUCTIONS
Schedule an MRI of the prostate, a fusion biopsy appointment and a return appointment for results.      [facility]  MRI Fusion Prostate Bx Patient Instructions  What is MRI Fusion Prostate Bx?  The MRI fusion biopsy is used to target a specific area for sampling. It requires a needle guide to be inserted into the rectum next to the prostate. Next, MR images are viewed, the abnormal area is identified, and a needle is inserted through the guide to the targeted area for tissue samples.  How do I prepare for the exam?    Take Cipro 500mg one tablet in the morning and one in the evening starting the day prior to procedure x 3 days    You will be receiving a Gentamicin intramuscular injection in the Urology clinic 30 min prior to your procedure. Please plan to arrive 30 min earlier.      Do Fleets Enema 2 hrs prior to procedure (eat a light meal)    Stop NSAIDS/Aspirin 7 days prior to procedure. If you are taking any other anticoagulation medications such as Coumadin, Heparin, or Lovenox, please consult with your physician prior to scheduling the procedure    How long will procedure take?  MRI fusion biopsy will take about 1 hr. Please allow 2 hrs for the whole procedure (including pre and post)  When will I know my results?  We will schedule a 2 week follow up appointment for you to review your pathology results with your physician.   *Please arrive 30 min prior to your scheduled procedure time*  Who do I contact if I have questions?  Please call Urology and [facility] at [phone] Opt # [phone] to speak to a nurse.    declines

## 2023-12-14 ENCOUNTER — TRANSFERRED RECORDS (OUTPATIENT)
Dept: HEALTH INFORMATION MANAGEMENT | Facility: CLINIC | Age: 78
End: 2023-12-14
Payer: MEDICARE

## 2024-01-27 ENCOUNTER — HEALTH MAINTENANCE LETTER (OUTPATIENT)
Age: 79
End: 2024-01-27

## 2024-07-09 ENCOUNTER — DOCUMENTATION ONLY (OUTPATIENT)
Dept: CARDIOLOGY | Facility: CLINIC | Age: 79
End: 2024-07-09
Payer: MEDICARE

## 2024-07-17 ENCOUNTER — TELEPHONE (OUTPATIENT)
Dept: CARDIOLOGY | Facility: CLINIC | Age: 79
End: 2024-07-17
Payer: MEDICARE

## 2024-07-17 NOTE — TELEPHONE ENCOUNTER
Called pt to discuss upcoming [facility]Scan appt and need to hold restricted medication. Pt acknowledged information and agreed to comply.

## 2024-07-30 ENCOUNTER — ANCILLARY PROCEDURE (OUTPATIENT)
Dept: NUCLEAR MEDICINE | Facility: CLINIC | Age: 79
End: 2024-07-30
Attending: PSYCHIATRY & NEUROLOGY
Payer: MEDICARE

## 2024-07-30 DIAGNOSIS — Z98.1 S/P CERVICAL SPINAL FUSION: ICD-10-CM

## 2024-07-30 DIAGNOSIS — R26.9 ABNORMAL GAIT: ICD-10-CM

## 2024-07-30 DIAGNOSIS — G44.86 CERVICOGENIC HEADACHE: ICD-10-CM

## 2024-07-30 DIAGNOSIS — R41.3 MEMORY LOSS: ICD-10-CM

## 2024-07-30 DIAGNOSIS — R47.01 APHASIA: ICD-10-CM

## 2024-07-30 PROCEDURE — A9584 IODINE I-123 IOFLUPANE: HCPCS | Mod: JZ | Performed by: RADIOLOGY

## 2024-07-30 PROCEDURE — 78803 RP LOCLZJ TUM SPECT 1 AREA: CPT | Performed by: RADIOLOGY

## 2024-07-30 RX ORDER — IOFLUPANE I-123 2 MCI/ML
3-5 INJECTION, SOLUTION INTRAVENOUS ONCE
Status: COMPLETED | OUTPATIENT
Start: 2024-07-30 | End: 2024-07-30

## 2024-07-30 RX ADMIN — IOFLUPANE I-123 4.7 MILLICURIE: 2 INJECTION, SOLUTION INTRAVENOUS at 11:44

## 2024-07-30 RX ADMIN — Medication 130 MG: at 10:36

## 2024-12-17 ENCOUNTER — TRANSCRIBE ORDERS (OUTPATIENT)
Dept: OTHER | Age: 79
End: 2024-12-17

## 2024-12-17 DIAGNOSIS — G20.A1 PARKINSON'S DISEASE WITHOUT DYSKINESIA, WITHOUT MENTION OF FLUCTUATIONS (H): ICD-10-CM

## 2024-12-17 DIAGNOSIS — R47.89 MOTOR SPEECH DISORDER: Primary | ICD-10-CM

## 2024-12-17 DIAGNOSIS — F80.89 DEVELOPMENTAL LANGUAGE DISORDER WITH IMPAIRMENT OF COMPREHENSION: ICD-10-CM

## 2024-12-17 DIAGNOSIS — F09 DEVELOPMENTAL LANGUAGE DISORDER WITH IMPAIRMENT OF COMPREHENSION: ICD-10-CM

## (undated) DEVICE — GLOVE PROTEXIS W/NEU-THERA 8.5  2D73TE85

## (undated) DEVICE — TUBING SUCTION 12"X1/4" N612

## (undated) DEVICE — CATH TRAY FOLEY SURESTEP 16FR W/URNE MTR STLK LATEX A303316A

## (undated) DEVICE — SU VICRYL 4-0 PS-2 18" UND J496H

## (undated) DEVICE — SYR BULB IRRIG 50ML LATEX FREE 0035280

## (undated) DEVICE — GLOVE PROTEXIS W/NEU-THERA 8.0  2D73TE80

## (undated) DEVICE — LINEN TOWEL PACK X6 WHITE 5487

## (undated) DEVICE — LINEN TOWEL PACK X5 5464

## (undated) DEVICE — ESU CORD BIPOLAR GREEN 10-4000

## (undated) DEVICE — SUCTION MANIFOLD DORNOCH ULTRA CART UL-CL500

## (undated) DEVICE — SU VICRYL 5-0 P-3 18" UND J493G

## (undated) DEVICE — GLOVE PROTEXIS MICRO 7.5  2D73PM75

## (undated) DEVICE — SU MERSILENE 5-0 S-24DA 18" 1761G

## (undated) DEVICE — DRAPE LAP W/ARMBOARD 29410

## (undated) DEVICE — PACK MINOR EYE CUSTOM ASC

## (undated) DEVICE — PREP POVIDONE IODINE SOLUTION 10% 4OZ

## (undated) DEVICE — PEN MARKING SKIN TYCO DEVON DUAL TIP 31145868

## (undated) DEVICE — PREP POVIDONE IODINE SCRUB 7.5% 4OZ APL82212

## (undated) DEVICE — SPECIMEN CONTAINER W/10% BUFFERED FORMALIN 120ML 591201

## (undated) DEVICE — DRSG KERLIX 4 1/2"X4YDS ROLL 6715

## (undated) DEVICE — ESU GROUND PAD ADULT W/CORD E7507

## (undated) DEVICE — GEL ULTRASOUND AQUASONIC 20GM 01-01

## (undated) DEVICE — SU PLAIN 6-0 G-1DA 18" 770G

## (undated) DEVICE — DRSG GAUZE 4X4" TRAY 6939

## (undated) DEVICE — Device

## (undated) DEVICE — ESU EYE HIGH TEMP 65410-183

## (undated) DEVICE — NDL 30GA 0.5" 305106

## (undated) DEVICE — SYR 03ML LL W/O NDL 309657

## (undated) DEVICE — BLADE KNIFE SURG 15 371115

## (undated) DEVICE — BLADE KNIFE SURG 10 371110

## (undated) DEVICE — PREP SKIN SCRUB TRAY 4461A

## (undated) DEVICE — EYE PREP BETADINE 5% SOLUTION 30ML 0065-0411-30

## (undated) DEVICE — SOL NACL 0.9% IRRIG 1000ML BOTTLE 2F7124

## (undated) DEVICE — PAD CHUX UNDERPAD 23X24" 7136

## (undated) RX ORDER — FENTANYL CITRATE 50 UG/ML
INJECTION, SOLUTION INTRAMUSCULAR; INTRAVENOUS
Status: DISPENSED
Start: 2019-04-04

## (undated) RX ORDER — PROPOFOL 10 MG/ML
INJECTION, EMULSION INTRAVENOUS
Status: DISPENSED
Start: 2020-04-18

## (undated) RX ORDER — BUPIVACAINE HYDROCHLORIDE AND EPINEPHRINE 5; 5 MG/ML; UG/ML
INJECTION, SOLUTION EPIDURAL; INTRACAUDAL; PERINEURAL
Status: DISPENSED
Start: 2020-04-18

## (undated) RX ORDER — LIDOCAINE HYDROCHLORIDE 20 MG/ML
INJECTION, SOLUTION EPIDURAL; INFILTRATION; INTRACAUDAL; PERINEURAL
Status: DISPENSED
Start: 2020-04-18

## (undated) RX ORDER — FENTANYL CITRATE 50 UG/ML
INJECTION, SOLUTION INTRAMUSCULAR; INTRAVENOUS
Status: DISPENSED
Start: 2020-04-18

## (undated) RX ORDER — LIDOCAINE HYDROCHLORIDE 10 MG/ML
INJECTION, SOLUTION INFILTRATION; PERINEURAL
Status: DISPENSED
Start: 2017-08-11

## (undated) RX ORDER — CEFAZOLIN SODIUM 1 G/3ML
INJECTION, POWDER, FOR SOLUTION INTRAMUSCULAR; INTRAVENOUS
Status: DISPENSED
Start: 2020-04-18

## (undated) RX ORDER — REGADENOSON 0.08 MG/ML
INJECTION, SOLUTION INTRAVENOUS
Status: DISPENSED
Start: 2019-12-26

## (undated) RX ORDER — HYDROCODONE BITARTRATE AND ACETAMINOPHEN 5; 325 MG/1; MG/1
TABLET ORAL
Status: DISPENSED
Start: 2020-04-18

## (undated) RX ORDER — PROPOFOL 10 MG/ML
INJECTION, EMULSION INTRAVENOUS
Status: DISPENSED
Start: 2019-04-04

## (undated) RX ORDER — LIDOCAINE HYDROCHLORIDE 20 MG/ML
INJECTION, SOLUTION EPIDURAL; INFILTRATION; INTRACAUDAL; PERINEURAL
Status: DISPENSED
Start: 2019-04-04

## (undated) RX ORDER — BUPIVACAINE HYDROCHLORIDE 2.5 MG/ML
INJECTION, SOLUTION EPIDURAL; INFILTRATION; INTRACAUDAL
Status: DISPENSED
Start: 2020-04-18

## (undated) RX ORDER — ACETAMINOPHEN 325 MG/1
TABLET ORAL
Status: DISPENSED
Start: 2019-04-04

## (undated) RX ORDER — TETRACAINE HYDROCHLORIDE 5 MG/ML
SOLUTION OPHTHALMIC
Status: DISPENSED
Start: 2019-04-04

## (undated) RX ORDER — CIPROFLOXACIN 500 MG/1
TABLET, FILM COATED ORAL
Status: DISPENSED
Start: 2017-08-11